# Patient Record
Sex: MALE | Race: WHITE | NOT HISPANIC OR LATINO | Employment: OTHER | URBAN - METROPOLITAN AREA
[De-identification: names, ages, dates, MRNs, and addresses within clinical notes are randomized per-mention and may not be internally consistent; named-entity substitution may affect disease eponyms.]

---

## 2023-09-01 ENCOUNTER — TELEPHONE (OUTPATIENT)
Dept: UROLOGY | Facility: CLINIC | Age: 58
End: 2023-09-01
Payer: COMMERCIAL

## 2023-09-01 ENCOUNTER — PATIENT MESSAGE (OUTPATIENT)
Dept: UROLOGY | Facility: CLINIC | Age: 58
End: 2023-09-01
Payer: COMMERCIAL

## 2023-09-01 NOTE — TELEPHONE ENCOUNTER
----- Message from Brian Quintero sent at 9/1/2023  9:04 AM CDT -----  Contact: pt at [phone]  Type:  Sooner Appointment Request    Caller is requesting a sooner appointment.  Caller declined first available appointment listed below.  Caller will not accept being placed on the waitlist and is requesting a message be sent to doctor.    Name of Caller:  pt  When is the first available appointment?  12/6  Symptoms:  kidney stone  Best Call Back Number: [phone]  Additional Information:  pt is calling the office to schedule an appt to have kidney stone removed and the date of 12/6 came up he states he needs to be seen sooner than that date ASAP.      PER THE PT PLEASE CALL BACK TODAY ASAP

## 2023-09-05 ENCOUNTER — OFFICE VISIT (OUTPATIENT)
Dept: UROLOGY | Facility: CLINIC | Age: 58
End: 2023-09-05
Payer: COMMERCIAL

## 2023-09-05 VITALS — HEIGHT: 67 IN | WEIGHT: 115.31 LBS | BODY MASS INDEX: 18.1 KG/M2

## 2023-09-05 DIAGNOSIS — N20.0 KIDNEY STONES: Primary | ICD-10-CM

## 2023-09-05 DIAGNOSIS — N13.1 HYDRONEPHROSIS DUE TO OBSTRUCTION OF URETER: ICD-10-CM

## 2023-09-05 DIAGNOSIS — Z98.890 HISTORY OF URETER REPAIR: ICD-10-CM

## 2023-09-05 LAB
BILIRUBIN, UA POC OHS: NEGATIVE
BLOOD, UA POC OHS: ABNORMAL
CLARITY, UA POC OHS: CLEAR
COLOR, UA POC OHS: YELLOW
GLUCOSE, UA POC OHS: NEGATIVE
KETONES, UA POC OHS: NEGATIVE
LEUKOCYTES, UA POC OHS: ABNORMAL
NITRITE, UA POC OHS: NEGATIVE
PH, UA POC OHS: 6
PROTEIN, UA POC OHS: 30
SPECIFIC GRAVITY, UA POC OHS: 1.01
UROBILINOGEN, UA POC OHS: 0.2

## 2023-09-05 PROCEDURE — 99204 OFFICE O/P NEW MOD 45 MIN: CPT | Mod: S$GLB,,, | Performed by: STUDENT IN AN ORGANIZED HEALTH CARE EDUCATION/TRAINING PROGRAM

## 2023-09-05 PROCEDURE — 1160F PR REVIEW ALL MEDS BY PRESCRIBER/CLIN PHARMACIST DOCUMENTED: ICD-10-PCS | Mod: CPTII,S$GLB,, | Performed by: STUDENT IN AN ORGANIZED HEALTH CARE EDUCATION/TRAINING PROGRAM

## 2023-09-05 PROCEDURE — 99999 PR PBB SHADOW E&M-EST. PATIENT-LVL III: ICD-10-PCS | Mod: PBBFAC,,, | Performed by: STUDENT IN AN ORGANIZED HEALTH CARE EDUCATION/TRAINING PROGRAM

## 2023-09-05 PROCEDURE — 1159F PR MEDICATION LIST DOCUMENTED IN MEDICAL RECORD: ICD-10-PCS | Mod: CPTII,S$GLB,, | Performed by: STUDENT IN AN ORGANIZED HEALTH CARE EDUCATION/TRAINING PROGRAM

## 2023-09-05 PROCEDURE — 99204 PR OFFICE/OUTPT VISIT, NEW, LEVL IV, 45-59 MIN: ICD-10-PCS | Mod: S$GLB,,, | Performed by: STUDENT IN AN ORGANIZED HEALTH CARE EDUCATION/TRAINING PROGRAM

## 2023-09-05 PROCEDURE — 3008F PR BODY MASS INDEX (BMI) DOCUMENTED: ICD-10-PCS | Mod: CPTII,S$GLB,, | Performed by: STUDENT IN AN ORGANIZED HEALTH CARE EDUCATION/TRAINING PROGRAM

## 2023-09-05 PROCEDURE — 3008F BODY MASS INDEX DOCD: CPT | Mod: CPTII,S$GLB,, | Performed by: STUDENT IN AN ORGANIZED HEALTH CARE EDUCATION/TRAINING PROGRAM

## 2023-09-05 PROCEDURE — 81003 POCT URINALYSIS(INSTRUMENT): ICD-10-PCS | Mod: QW,S$GLB,, | Performed by: STUDENT IN AN ORGANIZED HEALTH CARE EDUCATION/TRAINING PROGRAM

## 2023-09-05 PROCEDURE — 81003 URINALYSIS AUTO W/O SCOPE: CPT | Mod: QW,S$GLB,, | Performed by: STUDENT IN AN ORGANIZED HEALTH CARE EDUCATION/TRAINING PROGRAM

## 2023-09-05 PROCEDURE — 99999 PR PBB SHADOW E&M-EST. PATIENT-LVL III: CPT | Mod: PBBFAC,,, | Performed by: STUDENT IN AN ORGANIZED HEALTH CARE EDUCATION/TRAINING PROGRAM

## 2023-09-05 PROCEDURE — 1160F RVW MEDS BY RX/DR IN RCRD: CPT | Mod: CPTII,S$GLB,, | Performed by: STUDENT IN AN ORGANIZED HEALTH CARE EDUCATION/TRAINING PROGRAM

## 2023-09-05 PROCEDURE — 1159F MED LIST DOCD IN RCRD: CPT | Mod: CPTII,S$GLB,, | Performed by: STUDENT IN AN ORGANIZED HEALTH CARE EDUCATION/TRAINING PROGRAM

## 2023-09-05 NOTE — PROGRESS NOTES
"[facility] - [facility]rology   Clinic Note    Subjective:     Chief Complaint: Nephrolithiasis      History of Present Illness:  Josh Ruth is a 58 y.o. male who presents to clinic for evaluation and management of kidney stones and right hydronephrosis. He is new to our clinic.    He has a long history of stones including an open ureterolithotomy with Dr. Gamboa. He was seen recently in the ED. CTRSS from 8/31/2023 was independently reviewed and interpreted showing 15 mm right ureteral stone with severe hydroureteronephrosis. There is parenchymal thinning of the right kidney. He Received a dose of Rocephin while in the ED.  Urine culture from 08/31/2023 showed no evidence of infection.  He has had no fevers.      Past medical, family, surgical and social history reviewed as documented in chart with pertinent positive medical, family, surgical and social history detailed in HPI.    A review systems was conducted with pertinent positive and negative findings documented in HPI.    Anticoagulation/Antiplatelets:  No    Objective:     Estimated body mass index is 18.06 kg/m² as calculated from the following:    Height as of this encounter: 5' 7" (1.702 m).    Weight as of this encounter: 52.3 kg (115 lb 4.8 oz).    Vital Signs (Most Recent)       Physical Exam  Constitutional:       General: He is not in acute distress.     Appearance: He is not ill-appearing or toxic-appearing.   Pulmonary:      Effort: Pulmonary effort is normal. No accessory muscle usage or respiratory distress.   Neurological:      Mental Status: He is alert.         Labs reviewed below:  Lab Results   Component Value Date    BUN 16 08/31/2023    CREATININE 1.00 08/31/2023    WBC 13.21 (H) 08/31/2023    HGB 13.5 (L) 08/31/2023    HCT 38.7 (L) 08/31/2023     08/31/2023    AST 27 08/31/2023    ALT 21 08/31/2023    ALKPHOS 104 08/31/2023    ALBUMIN 3.7 08/31/2023      Urine dipstick today showed large blood, moderate leukocyte esterase, and " negative nitrite.     Assessment:     1. Kidney stones    2. Hydronephrosis due to obstruction of ureter    3. History of ureter repair      Plan:     We reviewed his previous surgical history and imaging.  I recommend a nephrostomy tube placement with IR followed by a nuclear medicine renal scan to evaluate differential function.  We discussed the possibility of nephrectomy if there is minimal function of the right kidney.  We discussed stone management either through an antegrade percutaneous approach or possible repeat ureterolithotomy and repair of the ureter if there is an evidence of blockage.    Defer management of the left renal stones for now but, he is interested in ESWL.     Paul Ruth MD

## 2023-09-27 ENCOUNTER — TELEPHONE (OUTPATIENT)
Dept: UROLOGY | Facility: CLINIC | Age: 58
End: 2023-09-27
Payer: COMMERCIAL

## 2023-10-04 ENCOUNTER — HOSPITAL ENCOUNTER (OUTPATIENT)
Dept: RADIOLOGY | Facility: HOSPITAL | Age: 58
Discharge: HOME | End: 2023-10-04
Attending: STUDENT IN AN ORGANIZED HEALTH CARE EDUCATION/TRAINING PROGRAM
Payer: COMMERCIAL

## 2023-10-04 DIAGNOSIS — N13.2 HYDRONEPHROSIS WITH URINARY OBSTRUCTION DUE TO URETERAL CALCULUS: ICD-10-CM

## 2023-10-04 PROCEDURE — 78708 K FLOW/FUNCT IMAGE W/DRUG: CPT | Mod: 26,,, | Performed by: RADIOLOGY

## 2023-10-04 PROCEDURE — 78708 NM RENOGRAM WITH LASIX: ICD-10-PCS | Mod: 26,,, | Performed by: RADIOLOGY

## 2023-10-04 PROCEDURE — 78708 K FLOW/FUNCT IMAGE W/DRUG: CPT | Mod: TC,PO

## 2023-10-09 ENCOUNTER — OFFICE VISIT (OUTPATIENT)
Dept: UROLOGY | Facility: CLINIC | Age: 58
End: 2023-10-09
Payer: COMMERCIAL

## 2023-10-09 ENCOUNTER — TELEPHONE (OUTPATIENT)
Dept: UROLOGY | Facility: CLINIC | Age: 58
End: 2023-10-09
Payer: COMMERCIAL

## 2023-10-09 VITALS — BODY MASS INDEX: 18.1 KG/M2 | WEIGHT: 115.31 LBS | HEIGHT: 67 IN

## 2023-10-09 DIAGNOSIS — N13.2 HYDRONEPHROSIS WITH URINARY OBSTRUCTION DUE TO URETERAL CALCULUS: Primary | ICD-10-CM

## 2023-10-09 DIAGNOSIS — N20.1 URETERAL STONE: Primary | ICD-10-CM

## 2023-10-09 LAB
BILIRUBIN, UA POC OHS: NEGATIVE
BLOOD, UA POC OHS: ABNORMAL
CLARITY, UA POC OHS: ABNORMAL
COLOR, UA POC OHS: YELLOW
GLUCOSE, UA POC OHS: NEGATIVE
KETONES, UA POC OHS: NEGATIVE
LEUKOCYTES, UA POC OHS: ABNORMAL
NITRITE, UA POC OHS: NEGATIVE
PH, UA POC OHS: 6
PROTEIN, UA POC OHS: NEGATIVE
SPECIFIC GRAVITY, UA POC OHS: 1.02
UROBILINOGEN, UA POC OHS: 1

## 2023-10-09 PROCEDURE — 1160F RVW MEDS BY RX/DR IN RCRD: CPT | Mod: CPTII,S$GLB,, | Performed by: STUDENT IN AN ORGANIZED HEALTH CARE EDUCATION/TRAINING PROGRAM

## 2023-10-09 PROCEDURE — 81003 POCT URINALYSIS(INSTRUMENT): ICD-10-PCS | Mod: QW,S$GLB,, | Performed by: STUDENT IN AN ORGANIZED HEALTH CARE EDUCATION/TRAINING PROGRAM

## 2023-10-09 PROCEDURE — 1159F MED LIST DOCD IN RCRD: CPT | Mod: CPTII,S$GLB,, | Performed by: STUDENT IN AN ORGANIZED HEALTH CARE EDUCATION/TRAINING PROGRAM

## 2023-10-09 PROCEDURE — 1159F PR MEDICATION LIST DOCUMENTED IN MEDICAL RECORD: ICD-10-PCS | Mod: CPTII,S$GLB,, | Performed by: STUDENT IN AN ORGANIZED HEALTH CARE EDUCATION/TRAINING PROGRAM

## 2023-10-09 PROCEDURE — 99214 PR OFFICE/OUTPT VISIT, EST, LEVL IV, 30-39 MIN: ICD-10-PCS | Mod: S$GLB,,, | Performed by: STUDENT IN AN ORGANIZED HEALTH CARE EDUCATION/TRAINING PROGRAM

## 2023-10-09 PROCEDURE — 3008F PR BODY MASS INDEX (BMI) DOCUMENTED: ICD-10-PCS | Mod: CPTII,S$GLB,, | Performed by: STUDENT IN AN ORGANIZED HEALTH CARE EDUCATION/TRAINING PROGRAM

## 2023-10-09 PROCEDURE — 87086 URINE CULTURE/COLONY COUNT: CPT | Performed by: STUDENT IN AN ORGANIZED HEALTH CARE EDUCATION/TRAINING PROGRAM

## 2023-10-09 PROCEDURE — 99999 PR PBB SHADOW E&M-EST. PATIENT-LVL II: ICD-10-PCS | Mod: PBBFAC,,, | Performed by: STUDENT IN AN ORGANIZED HEALTH CARE EDUCATION/TRAINING PROGRAM

## 2023-10-09 PROCEDURE — 99999 PR PBB SHADOW E&M-EST. PATIENT-LVL II: CPT | Mod: PBBFAC,,, | Performed by: STUDENT IN AN ORGANIZED HEALTH CARE EDUCATION/TRAINING PROGRAM

## 2023-10-09 PROCEDURE — 3008F BODY MASS INDEX DOCD: CPT | Mod: CPTII,S$GLB,, | Performed by: STUDENT IN AN ORGANIZED HEALTH CARE EDUCATION/TRAINING PROGRAM

## 2023-10-09 PROCEDURE — 1160F PR REVIEW ALL MEDS BY PRESCRIBER/CLIN PHARMACIST DOCUMENTED: ICD-10-PCS | Mod: CPTII,S$GLB,, | Performed by: STUDENT IN AN ORGANIZED HEALTH CARE EDUCATION/TRAINING PROGRAM

## 2023-10-09 PROCEDURE — 99214 OFFICE O/P EST MOD 30 MIN: CPT | Mod: S$GLB,,, | Performed by: STUDENT IN AN ORGANIZED HEALTH CARE EDUCATION/TRAINING PROGRAM

## 2023-10-09 PROCEDURE — 81003 URINALYSIS AUTO W/O SCOPE: CPT | Mod: QW,S$GLB,, | Performed by: STUDENT IN AN ORGANIZED HEALTH CARE EDUCATION/TRAINING PROGRAM

## 2023-10-09 NOTE — PROGRESS NOTES
"[facility] - Urology   Clinic Note    Subjective:     Chief Complaint: Follow-up      History of Present Illness:  Josh Ruth is a 58 y.o. male who presents to clinic for evaluation and management of kidney stones and right hydronephrosis. He is new to our clinic.    He has a long history of stones including an open ureterolithotomy with Dr. Gamboa. He was seen recently in the ED. CTRSS from 8/31/2023 shows a 15 mm right ureteral stone with severe hydroureteronephrosis. There is another ureteral stone distal to this. There is some parenchymal thinning of the right kidney. He underwent right NT placement with IR and subsequent renal scan 10/4/2023. This showed differential function of 63% left and 37% right. Poor response to lasix bilaterally. Scans were independently reviewed and interpreted.        Past medical, family, surgical and social history reviewed as documented in chart with pertinent positive medical, family, surgical and social history detailed in HPI.    A review systems was conducted with pertinent positive and negative findings documented in HPI.    Anticoagulation/Antiplatelets:  No    Objective:     Estimated body mass index is 18.06 kg/m² as calculated from the following:    Height as of this encounter: 5' 7" (1.702 m).    Weight as of this encounter: 52.3 kg (115 lb 4.8 oz).    Vital Signs (Most Recent)       Physical Exam  Constitutional:       General: He is not in acute distress.     Appearance: He is not ill-appearing or toxic-appearing.   Pulmonary:      Effort: Pulmonary effort is normal. No accessory muscle usage or respiratory distress.   Neurological:      Mental Status: He is alert.       Labs reviewed below:  Lab Results   Component Value Date    BUN 16 08/31/2023    CREATININE 1.00 08/31/2023    WBC 13.05 (H) 09/26/2023    HGB 15.0 09/26/2023    HCT 44.2 09/26/2023     09/26/2023    AST 27 08/31/2023    ALT 21 08/31/2023    ALKPHOS 104 08/31/2023    ALBUMIN 3.7 " 08/31/2023      Urine dipstick today showed large blood, moderate leukocyte esterase, and negative nitrite.     Assessment:     1. Hydronephrosis with urinary obstruction due to ureteral calculus      Plan:     Plan for retrograde ureteroscopy with laser lithotripsy possible ureteral balloon dilation.   I have explained the indication, risks, benefits, and alternatives of the procedure in detail.  Risks including but not limited to bleeding, infection, injury to the urethra, bladder, ureter, need for additional treatments, inability or incomplete removal of kidney stones, pain, and discomfort related to the stent were discussed in depth with the patient.  The patient voices understanding and all questions have been answered.  The patient agrees to proceed as planned with right ureteroscopy, laser lithotripsy, and possible ureteral stent placement.   Discussed the possibility of ureteral stricture with possible dilation.    Defer management of the left renal stones for now but, he is interested in ESWL.     Paul Ruth MD

## 2023-10-11 LAB — BACTERIA UR CULT: NORMAL

## 2023-10-17 ENCOUNTER — TELEPHONE (OUTPATIENT)
Dept: UROLOGY | Facility: CLINIC | Age: 58
End: 2023-10-17
Payer: COMMERCIAL

## 2023-10-19 ENCOUNTER — TELEPHONE (OUTPATIENT)
Dept: UROLOGY | Facility: CLINIC | Age: 58
End: 2023-10-19
Payer: COMMERCIAL

## 2023-10-27 ENCOUNTER — TELEPHONE (OUTPATIENT)
Dept: UROLOGY | Facility: CLINIC | Age: 58
End: 2023-10-27
Payer: COMMERCIAL

## 2023-10-27 NOTE — TELEPHONE ENCOUNTER
----- Message from Elizabeth Vegas sent at 10/27/2023 11:28 AM CDT -----  Contact: Patient  Type:  Needs Medical Advice    Who Called: Patient    Pharmacy name and phone #:    [facility][id number] - [facility] [address] [facility] [address] [address] [address] [address]  [id number] [place] [address]  Phone: [phone] Fax: [phone]      Would the patient rather a call back or a response via [facility]? Call    Best Call Back Number: [phone] (home) [phone] (work)    Additional Information: Patient is requesting to speak with a nurse

## 2023-11-08 ENCOUNTER — PROCEDURE VISIT (OUTPATIENT)
Dept: UROLOGY | Facility: CLINIC | Age: 58
End: 2023-11-08
Payer: COMMERCIAL

## 2023-11-08 VITALS — BODY MASS INDEX: 17.34 KG/M2 | WEIGHT: 114.44 LBS | HEIGHT: 68 IN

## 2023-11-08 DIAGNOSIS — N13.2 HYDRONEPHROSIS WITH URINARY OBSTRUCTION DUE TO URETERAL CALCULUS: Primary | ICD-10-CM

## 2023-11-08 LAB
BILIRUBIN, UA POC OHS: ABNORMAL
BLOOD, UA POC OHS: ABNORMAL
CLARITY, UA POC OHS: ABNORMAL
COLOR, UA POC OHS: YELLOW
GLUCOSE, UA POC OHS: NEGATIVE
KETONES, UA POC OHS: NEGATIVE
LEUKOCYTES, UA POC OHS: ABNORMAL
NITRITE, UA POC OHS: NEGATIVE
PH, UA POC OHS: 6
PROTEIN, UA POC OHS: >=300
SPECIFIC GRAVITY, UA POC OHS: >=1.03
UROBILINOGEN, UA POC OHS: 0.2

## 2023-11-08 PROCEDURE — 81003 URINALYSIS AUTO W/O SCOPE: CPT | Mod: QW,S$GLB,, | Performed by: STUDENT IN AN ORGANIZED HEALTH CARE EDUCATION/TRAINING PROGRAM

## 2023-11-08 PROCEDURE — 81003 POCT URINALYSIS(INSTRUMENT): ICD-10-PCS | Mod: QW,S$GLB,, | Performed by: STUDENT IN AN ORGANIZED HEALTH CARE EDUCATION/TRAINING PROGRAM

## 2023-11-08 RX ORDER — SULFAMETHOXAZOLE AND TRIMETHOPRIM 800; 160 MG/1; MG/1
1 TABLET ORAL 2 TIMES DAILY
Qty: 14 TABLET | Refills: 0 | Status: SHIPPED | OUTPATIENT
Start: 2023-11-08 | End: 2023-11-15

## 2023-11-08 NOTE — PROCEDURES
CYSTOSCOPY W/ STENT REMOVAL    Date/Time: 11/8/2023 1:00 PM    Performed by: Paul Ruth MD  Authorized by: Paul Ruth MD      Procedure:   Flexible cysto-urethroscopy and ureteral stent removal    Pre Procedure Diagnosis:   s/p ureteroscopy and stent placement    Post Procedure Diagnosis:   Same    Surgeon: Paul Ruth MD     Anesthesia: 2% uro-jet lidocaine jelly for local analgesia     Procedure note:  The risks and benefits were explained and informed consent was obtained. 2% lidocaine urojet was used for local analgesia. The genitalia was prepped and draped in the sterile fashion.    Flexible cysto-urethroscopy was performed. The flexible scope was advanced into the urethra and into the bladder. The stent was removed without difficulty.    The patient tolerated the procedure well without complication.     He will follow up in 1 week to consider removal of his nephrostomy tube which he will leave clamped

## 2023-11-17 ENCOUNTER — OFFICE VISIT (OUTPATIENT)
Dept: UROLOGY | Facility: CLINIC | Age: 58
End: 2023-11-17
Payer: COMMERCIAL

## 2023-11-17 VITALS — BODY MASS INDEX: 17.34 KG/M2 | HEIGHT: 68 IN | WEIGHT: 114.44 LBS

## 2023-11-17 DIAGNOSIS — N13.2 HYDRONEPHROSIS WITH URINARY OBSTRUCTION DUE TO URETERAL CALCULUS: Primary | ICD-10-CM

## 2023-11-17 DIAGNOSIS — Z98.890 HISTORY OF URETER REPAIR: ICD-10-CM

## 2023-11-17 DIAGNOSIS — N20.0 KIDNEY STONES: ICD-10-CM

## 2023-11-17 PROCEDURE — 99999 PR PBB SHADOW E&M-EST. PATIENT-LVL III: ICD-10-PCS | Mod: PBBFAC,,, | Performed by: STUDENT IN AN ORGANIZED HEALTH CARE EDUCATION/TRAINING PROGRAM

## 2023-11-17 PROCEDURE — 3008F BODY MASS INDEX DOCD: CPT | Mod: CPTII,S$GLB,, | Performed by: STUDENT IN AN ORGANIZED HEALTH CARE EDUCATION/TRAINING PROGRAM

## 2023-11-17 PROCEDURE — 1159F PR MEDICATION LIST DOCUMENTED IN MEDICAL RECORD: ICD-10-PCS | Mod: CPTII,S$GLB,, | Performed by: STUDENT IN AN ORGANIZED HEALTH CARE EDUCATION/TRAINING PROGRAM

## 2023-11-17 PROCEDURE — 99213 OFFICE O/P EST LOW 20 MIN: CPT | Mod: S$GLB,,, | Performed by: STUDENT IN AN ORGANIZED HEALTH CARE EDUCATION/TRAINING PROGRAM

## 2023-11-17 PROCEDURE — 99999 PR PBB SHADOW E&M-EST. PATIENT-LVL III: CPT | Mod: PBBFAC,,, | Performed by: STUDENT IN AN ORGANIZED HEALTH CARE EDUCATION/TRAINING PROGRAM

## 2023-11-17 PROCEDURE — 1159F MED LIST DOCD IN RCRD: CPT | Mod: CPTII,S$GLB,, | Performed by: STUDENT IN AN ORGANIZED HEALTH CARE EDUCATION/TRAINING PROGRAM

## 2023-11-17 PROCEDURE — 3008F PR BODY MASS INDEX (BMI) DOCUMENTED: ICD-10-PCS | Mod: CPTII,S$GLB,, | Performed by: STUDENT IN AN ORGANIZED HEALTH CARE EDUCATION/TRAINING PROGRAM

## 2023-11-17 PROCEDURE — 1160F PR REVIEW ALL MEDS BY PRESCRIBER/CLIN PHARMACIST DOCUMENTED: ICD-10-PCS | Mod: CPTII,S$GLB,, | Performed by: STUDENT IN AN ORGANIZED HEALTH CARE EDUCATION/TRAINING PROGRAM

## 2023-11-17 PROCEDURE — 1160F RVW MEDS BY RX/DR IN RCRD: CPT | Mod: CPTII,S$GLB,, | Performed by: STUDENT IN AN ORGANIZED HEALTH CARE EDUCATION/TRAINING PROGRAM

## 2023-11-17 PROCEDURE — 99213 PR OFFICE/OUTPT VISIT, EST, LEVL III, 20-29 MIN: ICD-10-PCS | Mod: S$GLB,,, | Performed by: STUDENT IN AN ORGANIZED HEALTH CARE EDUCATION/TRAINING PROGRAM

## 2023-11-17 NOTE — PROGRESS NOTES
"[facility] - Urology   Clinic Note    Subjective:     Chief Complaint: No chief complaint on file.      History of Present Illness:  Josh Ruth is a 58 y.o. male who presents to clinic for evaluation and management of kidney stones and right hydronephrosis. He is new to our clinic.    He has a long history of stones including an open ureterolithotomy with Dr. Gamboa. He was seen recently in the ED. CTRSS from 8/31/2023 shows a 15 mm right ureteral stone with severe hydroureteronephrosis.  He underwent right NT placement with IR and subsequent renal scan 10/4/2023. This showed differential function of 63% left and 37% right. Poor response to lasix bilaterally.   He underwent retrograde ureteroscopy and laser lithotripsy. He NT was clamped and his stent was subsequently removed. He presents today to evaluate for NT removal after clamping trial.     He feels like he had LEFT flank pain and subsequent passed a stone in the last week.     Past medical, family, surgical and social history reviewed as documented in chart with pertinent positive medical, family, surgical and social history detailed in HPI.    A review systems was conducted with pertinent positive and negative findings documented in HPI.    Anticoagulation/Antiplatelets:  No    Objective:     Estimated body mass index is 17.66 kg/m² as calculated from the following:    Height as of 11/8/23: 5' 7.5" (1.715 m).    Weight as of 11/8/23: 51.9 kg (114 lb 6.7 oz).    Vital Signs (Most Recent)       Physical Exam  Constitutional:       General: He is not in acute distress.     Appearance: He is not ill-appearing or toxic-appearing.   Pulmonary:      Effort: Pulmonary effort is normal. No accessory muscle usage or respiratory distress.   Neurological:      Mental Status: He is alert.         Labs reviewed below:  Lab Results   Component Value Date    BUN 14 10/28/2023    CREATININE 0.90 10/28/2023    WBC 13.93 (H) 10/28/2023    HGB 14.0 10/28/2023    HCT " 40.6 10/28/2023     10/28/2023    AST 28 10/28/2023    ALT 18 10/28/2023    ALKPHOS 106 10/28/2023    ALBUMIN 3.8 10/28/2023      Urine dipstick today showed large blood, moderate leukocyte esterase, and negative nitrite.     Assessment:     1. Hydronephrosis with urinary obstruction due to ureteral calculus    2. History of ureter repair    3. Kidney stones      Plan:     NT removed today  Take Keflex Rx due to duration of indwelling tubes and removal  Plan for KUB to evaluate stone burden and consider further stone management     Paul Ruth MD

## 2024-06-11 ENCOUNTER — OFFICE VISIT (OUTPATIENT)
Dept: UROLOGY | Facility: CLINIC | Age: 59
End: 2024-06-11
Payer: COMMERCIAL

## 2024-06-11 ENCOUNTER — HOSPITAL ENCOUNTER (OUTPATIENT)
Dept: RADIOLOGY | Facility: HOSPITAL | Age: 59
Discharge: HOME | End: 2024-06-11
Payer: COMMERCIAL

## 2024-06-11 VITALS — BODY MASS INDEX: 18.14 KG/M2 | HEIGHT: 68 IN | WEIGHT: 119.69 LBS

## 2024-06-11 DIAGNOSIS — Z87.442 HISTORY OF KIDNEY STONES: Primary | ICD-10-CM

## 2024-06-11 DIAGNOSIS — Z87.442 HISTORY OF KIDNEY STONES: ICD-10-CM

## 2024-06-11 DIAGNOSIS — R10.30 LOWER ABDOMINAL PAIN: ICD-10-CM

## 2024-06-11 LAB
BILIRUBIN, UA POC OHS: NEGATIVE
BLOOD, UA POC OHS: ABNORMAL
CLARITY, UA POC OHS: CLEAR
COLOR, UA POC OHS: YELLOW
GLUCOSE, UA POC OHS: NEGATIVE
KETONES, UA POC OHS: NEGATIVE
LEUKOCYTES, UA POC OHS: ABNORMAL
NITRITE, UA POC OHS: NEGATIVE
PH, UA POC OHS: 6
PROTEIN, UA POC OHS: 30
SPECIFIC GRAVITY, UA POC OHS: 1.02
UROBILINOGEN, UA POC OHS: 0.2

## 2024-06-11 PROCEDURE — 1159F MED LIST DOCD IN RCRD: CPT | Mod: CPTII,S$GLB,,

## 2024-06-11 PROCEDURE — 81003 URINALYSIS AUTO W/O SCOPE: CPT | Mod: QW,S$GLB,,

## 2024-06-11 PROCEDURE — 87086 URINE CULTURE/COLONY COUNT: CPT

## 2024-06-11 PROCEDURE — 74176 CT ABD & PELVIS W/O CONTRAST: CPT | Mod: TC,PO

## 2024-06-11 PROCEDURE — 99213 OFFICE O/P EST LOW 20 MIN: CPT | Mod: S$GLB,,,

## 2024-06-11 PROCEDURE — 1160F RVW MEDS BY RX/DR IN RCRD: CPT | Mod: CPTII,S$GLB,,

## 2024-06-11 PROCEDURE — 74176 CT ABD & PELVIS W/O CONTRAST: CPT | Mod: 26,,, | Performed by: STUDENT IN AN ORGANIZED HEALTH CARE EDUCATION/TRAINING PROGRAM

## 2024-06-11 PROCEDURE — 3008F BODY MASS INDEX DOCD: CPT | Mod: CPTII,S$GLB,,

## 2024-06-11 PROCEDURE — 99999 PR PBB SHADOW E&M-EST. PATIENT-LVL III: CPT | Mod: PBBFAC,,,

## 2024-06-11 RX ORDER — TAMSULOSIN HYDROCHLORIDE 0.4 MG/1
0.4 CAPSULE ORAL DAILY
Qty: 30 CAPSULE | Refills: 0 | Status: SHIPPED | OUTPATIENT
Start: 2024-06-11 | End: 2024-07-11

## 2024-06-11 RX ORDER — TAMSULOSIN HYDROCHLORIDE 0.4 MG/1
CAPSULE ORAL
Qty: 90 CAPSULE | OUTPATIENT
Start: 2024-06-11

## 2024-06-11 NOTE — PROGRESS NOTES
"[facility] Clinic Note  Staff: ELLIE Duncan    PCP: MD Claire    Chief Complaint: Kidney Stone    Subjective:        HPI: Josh Ruth is a 59 y.o. male new patient to me presents today for evaluation of kidney stone. He is established to our office and was followed by Dr. Ruth. He has a history of kidney stones. He states he passed a stone 10 days ago and then another 2 days later. He states he knows he currently has a stone in his bladder but states it is stuck and he cannot pass it. He has not had recent imaging. He denies dysuria, hematuria, fever, flank pain, incontinence, and difficulty urinating.     Questions asked the pt during ov today:  Urgency: No, urge incontinence? No  Dysuria: No  Gross Hematuria:No  Straining:No, Hesistancy:No, Intermittency:No}, Weak stream:No    Last PSA Screening: No results found for: "PSA", "PSADIAG"    History of Kidney Stones?:  Yes    Constipation issues?:  No    REVIEW OF SYSTEMS:  Review of Systems   Constitutional: Negative.  Negative for chills and fever.   HENT: Negative.     Eyes: Negative.    Respiratory: Negative.     Cardiovascular: Negative.    Gastrointestinal: Negative.  Negative for abdominal pain, constipation, diarrhea, nausea and vomiting.   Genitourinary:  Negative for dysuria, flank pain, frequency, hematuria and urgency.   Musculoskeletal: Negative.  Negative for back pain.   Skin: Negative.    Neurological: Negative.    Endo/Heme/Allergies: Negative.    Psychiatric/Behavioral: Negative.         PMHx:  Past Medical History:   Diagnosis Date    Hypertension     Renal disorder        PSHx:  Past Surgical History:   Procedure Laterality Date    CYSTOSCOPY W/ LASER LITHOTRIPSY      CYSTOURETEROSCOPY WITH RETROGRADE PYELOGRAPHY AND INSERTION OF STENT INTO URETER Right 10/26/2023    Procedure: CYSTOURETEROSCOPY, WITH RETROGRADE PYELOGRAM AND URETERAL STENT INSERTION;  Surgeon: Paul Ruth MD;  Location: [facility] OR;  Service: " Urology;  Laterality: Right;    LASER LITHOTRIPSY Right 10/26/2023    Procedure: LITHOTRIPSY, USING LASER-THULIUM;  Surgeon: Paul Ruth MD;  Location: [facility] OR;  Service: Urology;  Laterality: Right;    LUMBAR FUSION  12/2019       Fam Hx:   malignancies: No    kidney stones: No     Soc Hx:  Lives in [place]    Allergies:  Pcn [penicillins]    Medications: reviewed     Objective:   There were no vitals filed for this visit.    Physical Exam  Constitutional:       Appearance: Normal appearance.   HENT:      Head: Normocephalic.      Mouth/Throat:      Mouth: Mucous membranes are moist.   Eyes:      Conjunctiva/sclera: Conjunctivae normal.   Pulmonary:      Effort: Pulmonary effort is normal.   Abdominal:      General: There is no distension.      Palpations: Abdomen is soft.      Tenderness: There is no abdominal tenderness. There is no right CVA tenderness or left CVA tenderness.   Musculoskeletal:         General: Normal range of motion.      Cervical back: Normal range of motion.   Skin:     General: Skin is warm.   Neurological:      Mental Status: He is alert and oriented to person, place, and time.   Psychiatric:         Mood and Affect: Mood normal.         Behavior: Behavior normal.         LABS REVIEW:  UA today:  Color:Clear, Yellow  Spec. Grav.  1.020  PH  6.0  Negative for nitrates, glucose, ketones, urobili, bili  Moderate blood  Positive protein  Trace leuks    Assessment:       1. History of kidney stones    2. Lower abdominal pain          Plan:     Urine sent for culture  CT RSS ordered and scheduled  Flomax 0.4mg prescribed to aid stone passage?  Recommendations:   Increase hydration 2 liters fluid per day.      Strain Urine     Take pain medications as needed     Call office for severe pain or fever >101  Things you can do to decrease your risk of recurring stones:  1. Increase fluid intake - You should be producing at least 2.5 L of urine per 24 hour period. If your urine is dark you need to be  drinking more water.  2. Lower sodium intake - this helps lower the amount of calcium being lost in your urine. An ideal intake is between 2300 and 3300mg of sodium daily.  3. Normal calcium diet - ideal intake is between 800 and 1200mg of calcium daily. You do not want to decrease the amount of calcium you take in because this can actually increase your risk of making stone.     Limit iced tea and kim,  avoid Tums and Rolaids, to avoid Vitamin C supplementation and to limit salt and red meat intake.        F/u as needed per treatment plan    [facility]: Active    Crissy Foley, INOCENCIAP-C

## 2024-06-12 DIAGNOSIS — N20.0 RIGHT KIDNEY STONE: ICD-10-CM

## 2024-06-12 DIAGNOSIS — Z87.442 HISTORY OF KIDNEY STONES: Primary | ICD-10-CM

## 2024-06-12 LAB — BACTERIA UR CULT: NORMAL

## 2024-06-12 NOTE — TELEPHONE ENCOUNTER
Spoke to pt on the phone in addition to [employer] message. Gave pt surgery date and location and the number to call to schedule pre op appointment. Pt verbalized understanding.

## 2024-07-10 ENCOUNTER — TELEPHONE (OUTPATIENT)
Dept: UROLOGY | Facility: CLINIC | Age: 59
End: 2024-07-10
Payer: COMMERCIAL

## 2024-07-10 NOTE — TELEPHONE ENCOUNTER
----- Message from Paul Ruth MD sent at 7/10/2024  1:59 PM CDT -----  Please cancel his appointment tomorrow. He needs to be scheduled 7/24/24 for right ureteroscopy the works with thulium. He does not need a visit tomorrow.   Thanks,  RW

## 2024-07-10 NOTE — TELEPHONE ENCOUNTER
----- Message from Laura Aponte sent at 7/9/2024  3:36 PM CDT -----  Contact: self  Type:  Sooner Appointment Request    Caller is requesting a sooner appointment.  Caller declined first available appointment listed below.  Caller will not accept being placed on the waitlist and is requesting a message be sent to doctor.    Name of Caller:  pt  When is the first available appointment?  N/a  Symptoms:   follow up/would like this week  Would the patient rather a call back or a response via [facility]? call  Best Call Back Number:  [phone]   Additional Information:  please call

## 2024-07-12 ENCOUNTER — TELEPHONE (OUTPATIENT)
Dept: UROLOGY | Facility: CLINIC | Age: 59
End: 2024-07-12
Payer: COMMERCIAL

## 2024-07-12 DIAGNOSIS — N20.0 KIDNEY STONES: Primary | ICD-10-CM

## 2024-07-15 ENCOUNTER — TELEPHONE (OUTPATIENT)
Dept: UROLOGY | Facility: CLINIC | Age: 59
End: 2024-07-15
Payer: COMMERCIAL

## 2024-07-22 ENCOUNTER — TELEPHONE (OUTPATIENT)
Dept: UROLOGY | Facility: CLINIC | Age: 59
End: 2024-07-22
Payer: COMMERCIAL

## 2024-07-22 DIAGNOSIS — N20.0 KIDNEY STONES: Primary | ICD-10-CM

## 2024-07-23 ENCOUNTER — TELEPHONE (OUTPATIENT)
Dept: UROLOGY | Facility: CLINIC | Age: 59
End: 2024-07-23
Payer: COMMERCIAL

## 2024-07-23 NOTE — TELEPHONE ENCOUNTER
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 Patient was rescheduled to 8/7/24  at [facility]. Patient expressed understanding.

## 2024-09-25 ENCOUNTER — TELEPHONE (OUTPATIENT)
Dept: UROLOGY | Facility: CLINIC | Age: 59
End: 2024-09-25
Payer: COMMERCIAL

## 2024-09-25 NOTE — TELEPHONE ENCOUNTER
----- Message from Kary Murry sent at 9/25/2024  1:01 PM CDT -----  Contact: Patient  Type:  Needs Medical Advice    Who Called:   Patient    Symptoms (please be specific):   Pain in back part of kidney since surgery / feels like back pain but not     Pharmacy name and phone #:        [facility] #[id number] - [facility] [address] [address] AT [facility] [address] [address]  [id number] [place] [id number]  Phone: [phone] Fax: [phone]    Would the patient rather a call back or a response via [facility]?   Call back  Best Call Back Number:   [phone]    Additional Information:   States he would like to speak with someone about symptoms he's experiencing since his surgery - please call - thank you

## 2024-09-26 ENCOUNTER — HOSPITAL ENCOUNTER (OUTPATIENT)
Dept: RADIOLOGY | Facility: HOSPITAL | Age: 59
Discharge: HOME | End: 2024-09-26
Attending: STUDENT IN AN ORGANIZED HEALTH CARE EDUCATION/TRAINING PROGRAM
Payer: COMMERCIAL

## 2024-09-26 DIAGNOSIS — N13.2 HYDRONEPHROSIS WITH URINARY OBSTRUCTION DUE TO URETERAL CALCULUS: ICD-10-CM

## 2024-09-26 PROCEDURE — 76775 US EXAM ABDO BACK WALL LIM: CPT | Mod: TC,PO

## 2024-09-26 PROCEDURE — 76775 US EXAM ABDO BACK WALL LIM: CPT | Mod: 26,,, | Performed by: RADIOLOGY

## 2024-09-27 ENCOUNTER — TELEPHONE (OUTPATIENT)
Dept: UROLOGY | Facility: CLINIC | Age: 59
End: 2024-09-27
Payer: COMMERCIAL

## 2024-09-27 NOTE — TELEPHONE ENCOUNTER
----- Message from Paul Ruth MD sent at 9/27/2024  9:52 AM CDT -----  Please let the patient know there was mild swelling of the kidney.  He should keep his follow-up for the renal scan as scheduled

## 2024-09-27 NOTE — TELEPHONE ENCOUNTER
Spoke to the patient in regards to swelling of the kidney per Dr. Ruth. Patient stated he is still having pain and would like to get a sooner appt. Scheduled. Message was sent to the provider to assist with patients care. Patient verbally understood the advise that was given.

## 2024-10-16 ENCOUNTER — HOSPITAL ENCOUNTER (OUTPATIENT)
Dept: RADIOLOGY | Facility: HOSPITAL | Age: 59
Discharge: HOME | End: 2024-10-16
Attending: STUDENT IN AN ORGANIZED HEALTH CARE EDUCATION/TRAINING PROGRAM
Payer: COMMERCIAL

## 2024-10-16 ENCOUNTER — TELEPHONE (OUTPATIENT)
Dept: UROLOGY | Facility: CLINIC | Age: 59
End: 2024-10-16
Payer: COMMERCIAL

## 2024-10-16 DIAGNOSIS — N13.2 HYDRONEPHROSIS WITH URINARY OBSTRUCTION DUE TO URETERAL CALCULUS: ICD-10-CM

## 2024-10-16 PROCEDURE — 78708 K FLOW/FUNCT IMAGE W/DRUG: CPT | Mod: TC,PO

## 2024-10-16 PROCEDURE — A9562 TC99M MERTIATIDE: HCPCS | Mod: PO | Performed by: STUDENT IN AN ORGANIZED HEALTH CARE EDUCATION/TRAINING PROGRAM

## 2024-10-16 PROCEDURE — 78708 K FLOW/FUNCT IMAGE W/DRUG: CPT | Mod: 26,,, | Performed by: STUDENT IN AN ORGANIZED HEALTH CARE EDUCATION/TRAINING PROGRAM

## 2024-10-16 RX ORDER — BETIATIDE 1 MG/1
5.5 INJECTION, POWDER, LYOPHILIZED, FOR SOLUTION INTRAVENOUS
Status: COMPLETED | OUTPATIENT
Start: 2024-10-16 | End: 2024-10-16

## 2024-10-16 RX ADMIN — BETIATIDE 5.5 MILLICURIE: 1 INJECTION, POWDER, LYOPHILIZED, FOR SOLUTION INTRAVENOUS at 01:10

## 2024-10-16 NOTE — TELEPHONE ENCOUNTER
----- Message from Paul Ruth MD sent at 10/16/2024  3:28 PM CDT -----  Can we try to move his visit up if someone is available sooner than scheduled?

## 2024-10-22 ENCOUNTER — LAB VISIT (OUTPATIENT)
Dept: LAB | Facility: HOSPITAL | Age: 59
End: 2024-10-22
Attending: STUDENT IN AN ORGANIZED HEALTH CARE EDUCATION/TRAINING PROGRAM
Payer: COMMERCIAL

## 2024-10-22 ENCOUNTER — OFFICE VISIT (OUTPATIENT)
Dept: UROLOGY | Facility: CLINIC | Age: 59
End: 2024-10-22
Payer: COMMERCIAL

## 2024-10-22 VITALS — WEIGHT: 120.38 LBS | BODY MASS INDEX: 18.89 KG/M2 | HEIGHT: 67 IN

## 2024-10-22 DIAGNOSIS — E21.3 HYPERPARATHYROIDISM: ICD-10-CM

## 2024-10-22 DIAGNOSIS — Z87.442 HISTORY OF KIDNEY STONES: ICD-10-CM

## 2024-10-22 DIAGNOSIS — E83.52 HYPERCALCEMIA: ICD-10-CM

## 2024-10-22 DIAGNOSIS — Z87.442 HISTORY OF KIDNEY STONES: Primary | ICD-10-CM

## 2024-10-22 DIAGNOSIS — N13.1 HYDRONEPHROSIS DUE TO OBSTRUCTION OF URETER: ICD-10-CM

## 2024-10-22 DIAGNOSIS — N13.2 HYDRONEPHROSIS WITH URINARY OBSTRUCTION DUE TO URETERAL CALCULUS: ICD-10-CM

## 2024-10-22 LAB
BILIRUBIN, UA POC OHS: NEGATIVE
BLOOD, UA POC OHS: ABNORMAL
CA-I BLDV-SCNC: 1.34 MMOL/L (ref 1.06–1.42)
CLARITY, UA POC OHS: CLEAR
COLOR, UA POC OHS: YELLOW
GLUCOSE, UA POC OHS: NEGATIVE
KETONES, UA POC OHS: NEGATIVE
LEUKOCYTES, UA POC OHS: NEGATIVE
NITRITE, UA POC OHS: NEGATIVE
PH, UA POC OHS: 6
PROTEIN, UA POC OHS: NEGATIVE
PTH-INTACT SERPL-MCNC: 69.9 PG/ML (ref 9–77)
SPECIFIC GRAVITY, UA POC OHS: 1.02
UROBILINOGEN, UA POC OHS: 0.2

## 2024-10-22 PROCEDURE — 81003 URINALYSIS AUTO W/O SCOPE: CPT | Mod: QW,S$GLB,, | Performed by: STUDENT IN AN ORGANIZED HEALTH CARE EDUCATION/TRAINING PROGRAM

## 2024-10-22 PROCEDURE — 83970 ASSAY OF PARATHORMONE: CPT | Performed by: STUDENT IN AN ORGANIZED HEALTH CARE EDUCATION/TRAINING PROGRAM

## 2024-10-22 PROCEDURE — 36415 COLL VENOUS BLD VENIPUNCTURE: CPT | Mod: PO | Performed by: STUDENT IN AN ORGANIZED HEALTH CARE EDUCATION/TRAINING PROGRAM

## 2024-10-22 PROCEDURE — 99999 PR PBB SHADOW E&M-EST. PATIENT-LVL IV: CPT | Mod: PBBFAC,,, | Performed by: STUDENT IN AN ORGANIZED HEALTH CARE EDUCATION/TRAINING PROGRAM

## 2024-10-22 PROCEDURE — 1160F RVW MEDS BY RX/DR IN RCRD: CPT | Mod: CPTII,S$GLB,, | Performed by: STUDENT IN AN ORGANIZED HEALTH CARE EDUCATION/TRAINING PROGRAM

## 2024-10-22 PROCEDURE — 3008F BODY MASS INDEX DOCD: CPT | Mod: CPTII,S$GLB,, | Performed by: STUDENT IN AN ORGANIZED HEALTH CARE EDUCATION/TRAINING PROGRAM

## 2024-10-22 PROCEDURE — 82330 ASSAY OF CALCIUM: CPT | Performed by: STUDENT IN AN ORGANIZED HEALTH CARE EDUCATION/TRAINING PROGRAM

## 2024-10-22 PROCEDURE — 99214 OFFICE O/P EST MOD 30 MIN: CPT | Mod: S$GLB,,, | Performed by: STUDENT IN AN ORGANIZED HEALTH CARE EDUCATION/TRAINING PROGRAM

## 2024-10-22 PROCEDURE — 1159F MED LIST DOCD IN RCRD: CPT | Mod: CPTII,S$GLB,, | Performed by: STUDENT IN AN ORGANIZED HEALTH CARE EDUCATION/TRAINING PROGRAM

## 2024-10-22 NOTE — PROGRESS NOTES
[facility]   Clinic Note    Subjective:     Chief Complaint: Follow-up      History of Present Illness:  Josh Ruth is a 59 y.o. male who presents to clinic for evaluation and management of kidney stones and right hydronephrosis. He is new to our clinic.    He has a long history of stones including an open ureterolithotomy for a 3 cm ureteral stone in 2019 with Dr. Gamboa. CTRSS from 8/31/2023 showe a 15 mm right ureteral stone with severe hydroureteronephrosis.  He underwent right NT placement with IR and subsequent renal scan 10/4/2023. This showed differential function of 63% left and 37% right. He underwent retrograde ureteroscopy 10/2023. The stones were successfully treated and there was no intrinsic narrowing up the ureter. The NT was clamped and his stent removed and subsequently the NT after a clamping trial.     He re-presented 6/2024 with flank pain and a CT RSS showed a bladder stone, right ureteral stone, and right renal stone.  He underwent a cystolitholapaxy and 1st stage ureteroscopy 7/24 with subsequent ureteroscopy 8/24    He presents today with repeat renal scan from 10/16/2024.  This shows differential function of 60% on the left and 3rd 2% on the right.  T1 half on the left is 14 minutes.  The right system does not empty despite administration of Lasix.     He reports right flank pain which is worse with ambulation.  He denies Dietl's crisis.  He does have a history of spinal surgery.    He reports a history of an elevated PTH and previous workup at [facility].    Past medical, family, surgical and social history reviewed as documented in chart with pertinent positive medical, family, surgical and social history detailed in HPI.    A review systems was conducted with pertinent positive and negative findings documented in HPI.    Anticoagulation/Antiplatelets:  No    Objective:     Estimated body mass index is 18.85 kg/m² as calculated from the following:    Height as of  "this encounter: 5' 7" (1.702 m).    Weight as of this encounter: 54.6 kg (120 lb 5.9 oz).    Vital Signs (Most Recent)       Physical Exam  Constitutional:       General: He is not in acute distress.     Appearance: He is not ill-appearing or toxic-appearing.   Pulmonary:      Effort: Pulmonary effort is normal. No accessory muscle usage or respiratory distress.   Neurological:      Mental Status: He is alert.         Labs reviewed below:  Lab Results   Component Value Date    BUN 14 10/28/2023    CREATININE 0.90 10/28/2023    WBC 13.93 (H) 10/28/2023    HGB 13.0 (L) 06/26/2024    HCT 40.6 10/28/2023     10/28/2023    AST 28 10/28/2023    ALT 18 10/28/2023    ALKPHOS 106 10/28/2023    ALBUMIN 3.8 10/28/2023      Urine dipstick today showed large blood, moderate leukocyte esterase, and negative nitrite.     Assessment:     1. History of kidney stones    2. Hydronephrosis due to obstruction of ureter    3. Hyperparathyroidism    4. Hypercalcemia        Plan:     Concern for post-operatively extrinsic etiology of obstruction vs adynamic ureteral segment s/p ureterolithotomy. No clear etiology seen on ureteroscopy.   Repeat CT urogram  Discussed further evaluation and management. Discussed nephrostomy tube, antegrade and retrograde evaluation, ureteroureterostomy.    Ref[place] to [place] Mariee at [facility] for [place] opinion    Repeat iPTH and calcium  Litholink ordered  Litholink ordered    Paul Ruth MD   "

## 2024-10-23 ENCOUNTER — TELEPHONE (OUTPATIENT)
Dept: UROLOGY | Facility: CLINIC | Age: 59
End: 2024-10-23
Payer: COMMERCIAL

## 2024-10-23 NOTE — TELEPHONE ENCOUNTER
Called patient to schedule appointment with Dr. Mariee.  Unable to leave message as mailbox if full.

## 2024-11-06 DIAGNOSIS — N13.1 HYDRONEPHROSIS WITH URETERAL STRICTURE: Primary | ICD-10-CM

## 2024-11-11 ENCOUNTER — HOSPITAL ENCOUNTER (OUTPATIENT)
Dept: RADIOLOGY | Facility: HOSPITAL | Age: 59
Discharge: HOME | End: 2024-11-11
Attending: STUDENT IN AN ORGANIZED HEALTH CARE EDUCATION/TRAINING PROGRAM
Payer: COMMERCIAL

## 2024-11-11 DIAGNOSIS — N13.1 HYDRONEPHROSIS DUE TO OBSTRUCTION OF URETER: ICD-10-CM

## 2024-11-11 DIAGNOSIS — Z87.442 HISTORY OF KIDNEY STONES: ICD-10-CM

## 2024-11-11 PROCEDURE — 74178 CT ABD&PLV WO CNTR FLWD CNTR: CPT | Mod: TC,PO

## 2024-11-11 PROCEDURE — 25500020 PHARM REV CODE 255: Mod: PO | Performed by: STUDENT IN AN ORGANIZED HEALTH CARE EDUCATION/TRAINING PROGRAM

## 2024-11-11 RX ADMIN — IOHEXOL 125 ML: 350 INJECTION, SOLUTION INTRAVENOUS at 03:11

## 2024-11-17 NOTE — PROGRESS NOTES
Subjective:      Patient ID: Josh Ruth is a 59 y.o. male.    Chief Complaint: kidney stones/hydronephrosis  Patient is a 59 y.o. male who is established to our clinic and was initially referred by Dr. Ruth for evaluation of 2nd opinion/hydronephrosis.     HPI  Patient has a history of kidney stones.  He previously underwent a right open ureterolithotomy with Dr. Gamboa.  He has had numerous other endoscopic evaluations including most recently a staged ureteroscopy with Dr. Ruth (7/3/24 & 8/7/24).    Mag 3 renal scan from 10/16/24 was independently reviewed today and reveals right renal obstruction, 32% right differential renal function compared to 68% left.    Patient is having regular flank and abdominal pain on the right side.  Denies any fevers or chills.  Here to discuss management of presumed right ureteral stricture and confirmed right renal obstruction.        Review of Systems  All other systems reviewed and negative except pertinent positives noted in HPI.    Objective:     Physical Exam  Constitutional:       General: He is not in acute distress.     Appearance: He is well-developed.   HENT:      Head: Normocephalic and atraumatic.   Eyes:      General: No scleral icterus.  Neck:      Trachea: No tracheal deviation.   Pulmonary:      Effort: Pulmonary effort is normal. No respiratory distress.   Abdominal:       Neurological:      Mental Status: He is alert and oriented to person, place, and time.   Psychiatric:         Behavior: Behavior normal.         Thought Content: Thought content normal.         Judgment: Judgment normal.       Assessment:     1. Hydronephrosis due to obstruction of ureter      Plan:     1. Hydronephrosis due to obstruction of ureter        No orders of the defined types were placed in this encounter.    1. Kidney stone:  -General risk factors for kidney stones and the conservative measures to prevent kidney stones in the future were discussed with the patient in  detail.  The patient was encouraged to drink 2-3 liters of water a day, limit iced tea and kim as well as foods high in oxalate.  They were cautioned to try to limit salt and red meat intake.  We also discussed adding citrate to the diet with the addition of noelle or lemon juice to their water or alternatively with crystal light.   imaging reviewed as per HPI.     -24 hour urine: patient will collect this week---has the container at home.       2. Right hydronephrosis:  -IR referral placed for nephrostomy tube for ureteral rest in anticipation for corrective surgery.   -will plan for an antegrade nephrostogram and retrograde pyelogram 4 weeks s/p neph tube placement for delineation of anatomy for surgical planning.   -Mag 3 renal scan from 10/16/24 was independently reviewed today and reveals 32% right differential renal function.      3. Hyperparathyroidism:  -referral to endocrine surgery.   -patient will do 24 hour urine to asses urinary calcium.   -pth normal most recently  -serum Ca high/normal.   By his report, he has a diagnosis of hyperparathyroidism and has been recommended to undergo parathyroidectomy.  Refer to endocrine surgery.       - code applied: patient requires or will require a continuous, longitudinal, and active collaborative plan of care related to this patient's health condition, kidney stones/hydronephrosis --the management of which requires the direction of a practitioner with specialized clinical knowledge, skill, and expertise.

## 2024-11-27 ENCOUNTER — OFFICE VISIT (OUTPATIENT)
Dept: UROLOGY | Facility: CLINIC | Age: 59
End: 2024-11-27
Payer: COMMERCIAL

## 2024-11-27 VITALS
BODY MASS INDEX: 18.79 KG/M2 | HEIGHT: 67 IN | HEART RATE: 64 BPM | DIASTOLIC BLOOD PRESSURE: 80 MMHG | SYSTOLIC BLOOD PRESSURE: 121 MMHG | WEIGHT: 119.69 LBS

## 2024-11-27 DIAGNOSIS — N13.1 HYDRONEPHROSIS DUE TO OBSTRUCTION OF URETER: ICD-10-CM

## 2024-11-27 DIAGNOSIS — E21.3 HYPERPARATHYROIDISM: Primary | ICD-10-CM

## 2024-11-27 PROCEDURE — 3074F SYST BP LT 130 MM HG: CPT | Mod: CPTII,S$GLB,, | Performed by: UROLOGY

## 2024-11-27 PROCEDURE — 3079F DIAST BP 80-89 MM HG: CPT | Mod: CPTII,S$GLB,, | Performed by: UROLOGY

## 2024-11-27 PROCEDURE — G2211 COMPLEX E/M VISIT ADD ON: HCPCS | Mod: S$GLB,,, | Performed by: UROLOGY

## 2024-11-27 PROCEDURE — 1159F MED LIST DOCD IN RCRD: CPT | Mod: CPTII,S$GLB,, | Performed by: UROLOGY

## 2024-11-27 PROCEDURE — 99999 PR PBB SHADOW E&M-EST. PATIENT-LVL IV: CPT | Mod: PBBFAC,,, | Performed by: UROLOGY

## 2024-11-27 PROCEDURE — 3008F BODY MASS INDEX DOCD: CPT | Mod: CPTII,S$GLB,, | Performed by: UROLOGY

## 2024-11-27 PROCEDURE — 99215 OFFICE O/P EST HI 40 MIN: CPT | Mod: S$GLB,,, | Performed by: UROLOGY

## 2024-11-27 PROCEDURE — 1160F RVW MEDS BY RX/DR IN RCRD: CPT | Mod: CPTII,S$GLB,, | Performed by: UROLOGY

## 2024-11-27 RX ORDER — OXYCODONE AND ACETAMINOPHEN 10; 325 MG/1; MG/1
1 TABLET ORAL EVERY 4 HOURS
COMMUNITY
Start: 2024-11-20

## 2024-12-06 ENCOUNTER — TELEPHONE (OUTPATIENT)
Dept: SURGERY | Facility: CLINIC | Age: 59
End: 2024-12-06
Payer: COMMERCIAL

## 2024-12-11 ENCOUNTER — TELEPHONE (OUTPATIENT)
Dept: UROLOGY | Facility: CLINIC | Age: 59
End: 2024-12-11
Payer: COMMERCIAL

## 2024-12-11 ENCOUNTER — TELEPHONE (OUTPATIENT)
Dept: INTERVENTIONAL RADIOLOGY/VASCULAR | Facility: CLINIC | Age: 59
End: 2024-12-11
Payer: COMMERCIAL

## 2024-12-11 NOTE — TELEPHONE ENCOUNTER
Call and spoke to patient to r/s his missed IR clinic consult today 12/11/2024. Patient stated he's not feeling well and will call IR back when ready to schedule.

## 2024-12-11 NOTE — TELEPHONE ENCOUNTER
Returned patient's call.  Patient reports he is sick and missed his IR appointment today.  Advised that I will let Dr. Mariee know.

## 2024-12-13 ENCOUNTER — TELEPHONE (OUTPATIENT)
Dept: INTERVENTIONAL RADIOLOGY/VASCULAR | Facility: CLINIC | Age: 59
End: 2024-12-13
Payer: COMMERCIAL

## 2024-12-18 ENCOUNTER — CLINICAL SUPPORT (OUTPATIENT)
Dept: INTERVENTIONAL RADIOLOGY/VASCULAR | Facility: CLINIC | Age: 59
End: 2024-12-18
Payer: COMMERCIAL

## 2024-12-18 DIAGNOSIS — N20.0 KIDNEY STONES: ICD-10-CM

## 2024-12-18 DIAGNOSIS — N13.1 HYDRONEPHROSIS DUE TO OBSTRUCTION OF URETER: Primary | ICD-10-CM

## 2024-12-18 PROCEDURE — 99204 OFFICE O/P NEW MOD 45 MIN: CPT | Mod: 95,,,

## 2024-12-18 NOTE — PROGRESS NOTES
Subjective     Patient ID: Josh Ruth is a 59 y.o. male.    Chief Complaint: right sided back pain    59 y.o. male referral from Dr. Mariee to set up patient for a right RCN for kidney stones/hydronephrosis for surgical planning and rest purposes.  He is s/p right open ureterolithotomy with Dr. Gamboa and staged ureteroscopy with Dr. Ruth. He reports Right sided back/flank pain, otherwise, doing well. He is recovering from RSV.     PMH and medications reviewed.   Not taking any blood thinners.  No CPAP Or O2 use at home. Will schedule with moderate sedation (Patient declined general. Stated he his able to hold his pain medications prior to the procedure if needed).   Dr. Mariee. clinic note reviewed.   Review of Systems   Constitutional:  Negative for appetite change and fatigue.   Respiratory:  Positive for cough and shortness of breath.    Cardiovascular:  Negative for chest pain.   Gastrointestinal:  Negative for abdominal pain.   Genitourinary:  Negative for difficulty urinating and hematuria.   Musculoskeletal:  Positive for back pain (right).   Neurological:  Negative for facial asymmetry and speech difficulty.   Psychiatric/Behavioral:  Negative for behavioral problems and confusion.         Objective     Physical Exam  Constitutional:       Appearance: Normal appearance.      Comments: Virtual visit.    HENT:      Head: Atraumatic.      Nose: Nose normal.   Eyes:      Conjunctiva/sclera: Conjunctivae normal.   Pulmonary:      Effort: Pulmonary effort is normal. No respiratory distress.   Neurological:      General: No focal deficit present.      Mental Status: He is alert and oriented to person, place, and time.   Psychiatric:         Mood and Affect: Mood normal.         Behavior: Behavior normal.        Assessment and Plan     1. Hydronephrosis due to obstruction of ureter  -     IR Nephrostomy Tube Placement; Future; Expected date: 12/18/2024  -     CBC Auto Differential; Future; Expected  date: 12/18/2024  -     Protime-INR; Future; Expected date: 12/18/2024    2. Kidney stones  -     IR Nephrostomy Tube Placement; Future; Expected date: 12/18/2024  -     CBC Auto Differential; Future; Expected date: 12/18/2024  -     Protime-INR; Future; Expected date: 12/18/2024    - Approved by Dr. Hirsch. Right PCN for mild hydro with ureteral stone/surgical clips. Urology planning for nephrostogram after 1 month to delineate anatomy for surgical planning.   - Patient reports to previously having a neph tube for 2 months placed by an outside provider.   - Discussed how the procedure will be performed, risks (including, but not limited to, pain, bleeding, infection, damage to nearby structures, and the need for additional procedures), benefits, possible complications, pre-post procedure expectations, and alternatives. The patient voices understanding and all questions have been answered.  The patient agrees to proceed as planned. Patient scheduled for 12/27/2024. Pre-procedure handout with clinic phone number provided.     The patient location is: [place]  The chief complaint leading to consultation is: right sided back pain     Visit type: audiovisual    30 minutes of total time spent on the encounter, which includes face to face time and non-face to face time preparing to see the patient (eg, review of tests), Obtaining and/or reviewing separately obtained history, Documenting clinical information in the electronic or other health record, Independently interpreting results (not separately reported) and communicating results to the patient/family/caregiver, or Care coordination (not separately reported).     Each patient to whom he or she provides medical services by telemedicine is:  (1) informed of the relationship between the physician and patient and the respective role of any other health care provider with respect to management of the patient; and (2) notified that he or she may decline to receive  medical services by telemedicine and may withdraw from such care at any time.    Emma Madera PA-C  Interventional Radiology  [phone]

## 2024-12-23 ENCOUNTER — LAB VISIT (OUTPATIENT)
Dept: LAB | Facility: HOSPITAL | Age: 59
End: 2024-12-23
Payer: COMMERCIAL

## 2024-12-23 DIAGNOSIS — N20.0 KIDNEY STONES: ICD-10-CM

## 2024-12-23 DIAGNOSIS — N13.1 HYDRONEPHROSIS DUE TO OBSTRUCTION OF URETER: ICD-10-CM

## 2024-12-23 LAB
BASOPHILS # BLD AUTO: 0.03 K/UL (ref 0–0.2)
BASOPHILS NFR BLD: 0.3 % (ref 0–1.9)
DIFFERENTIAL METHOD BLD: ABNORMAL
EOSINOPHIL # BLD AUTO: 0.1 K/UL (ref 0–0.5)
EOSINOPHIL NFR BLD: 1 % (ref 0–8)
ERYTHROCYTE [DISTWIDTH] IN BLOOD BY AUTOMATED COUNT: 12.6 % (ref 11.5–14.5)
HCT VFR BLD AUTO: 41.8 % (ref 40–54)
HGB BLD-MCNC: 14.6 G/DL (ref 14–18)
IMM GRANULOCYTES # BLD AUTO: 0.05 K/UL (ref 0–0.04)
IMM GRANULOCYTES NFR BLD AUTO: 0.4 % (ref 0–0.5)
INR PPP: 1 (ref 0.8–1.2)
LYMPHOCYTES # BLD AUTO: 3.3 K/UL (ref 1–4.8)
LYMPHOCYTES NFR BLD: 28.2 % (ref 18–48)
MCH RBC QN AUTO: 31.1 PG (ref 27–31)
MCHC RBC AUTO-ENTMCNC: 34.9 G/DL (ref 32–36)
MCV RBC AUTO: 89 FL (ref 82–98)
MONOCYTES # BLD AUTO: 0.7 K/UL (ref 0.3–1)
MONOCYTES NFR BLD: 5.9 % (ref 4–15)
NEUTROPHILS # BLD AUTO: 7.6 K/UL (ref 1.8–7.7)
NEUTROPHILS NFR BLD: 64.2 % (ref 38–73)
NRBC BLD-RTO: 0 /100 WBC
PLATELET # BLD AUTO: 296 K/UL (ref 150–450)
PMV BLD AUTO: 8.7 FL (ref 9.2–12.9)
PROTHROMBIN TIME: 10.9 SEC (ref 9–12.5)
RBC # BLD AUTO: 4.69 M/UL (ref 4.6–6.2)
WBC # BLD AUTO: 11.77 K/UL (ref 3.9–12.7)

## 2024-12-23 PROCEDURE — 85610 PROTHROMBIN TIME: CPT | Mod: PO

## 2024-12-23 PROCEDURE — 85025 COMPLETE CBC W/AUTO DIFF WBC: CPT | Mod: PO

## 2024-12-23 PROCEDURE — 36415 COLL VENOUS BLD VENIPUNCTURE: CPT | Mod: PO

## 2024-12-26 ENCOUNTER — TELEPHONE (OUTPATIENT)
Dept: INTERVENTIONAL RADIOLOGY/VASCULAR | Facility: HOSPITAL | Age: 59
End: 2024-12-26
Payer: COMMERCIAL

## 2024-12-27 ENCOUNTER — HOSPITAL ENCOUNTER (OUTPATIENT)
Dept: INTERVENTIONAL RADIOLOGY/VASCULAR | Facility: HOSPITAL | Age: 59
Discharge: HOME | End: 2024-12-27
Payer: COMMERCIAL

## 2024-12-27 VITALS
HEART RATE: 56 BPM | DIASTOLIC BLOOD PRESSURE: 83 MMHG | TEMPERATURE: 98 F | WEIGHT: 125 LBS | RESPIRATION RATE: 20 BRPM | SYSTOLIC BLOOD PRESSURE: 142 MMHG | OXYGEN SATURATION: 97 % | BODY MASS INDEX: 19.62 KG/M2 | HEIGHT: 67 IN

## 2024-12-27 DIAGNOSIS — N13.1 HYDRONEPHROSIS DUE TO OBSTRUCTION OF URETER: ICD-10-CM

## 2024-12-27 DIAGNOSIS — N20.0 KIDNEY STONES: ICD-10-CM

## 2024-12-27 DIAGNOSIS — N13.30 HYDRONEPHROSIS: ICD-10-CM

## 2024-12-27 PROCEDURE — 99153 MOD SED SAME PHYS/QHP EA: CPT

## 2024-12-27 PROCEDURE — 99152 MOD SED SAME PHYS/QHP 5/>YRS: CPT | Mod: ,,, | Performed by: RADIOLOGY

## 2024-12-27 PROCEDURE — 99152 MOD SED SAME PHYS/QHP 5/>YRS: CPT

## 2024-12-27 PROCEDURE — 50432 PLMT NEPHROSTOMY CATHETER: CPT | Mod: RT | Performed by: RADIOLOGY

## 2024-12-27 PROCEDURE — C1729 CATH, DRAINAGE: HCPCS

## 2024-12-27 PROCEDURE — 99153 MOD SED SAME PHYS/QHP EA: CPT | Performed by: RADIOLOGY

## 2024-12-27 PROCEDURE — 50432 PLMT NEPHROSTOMY CATHETER: CPT | Mod: RT,,, | Performed by: RADIOLOGY

## 2024-12-27 PROCEDURE — C1894 INTRO/SHEATH, NON-LASER: HCPCS

## 2024-12-27 PROCEDURE — 63600175 PHARM REV CODE 636 W HCPCS: Performed by: PHYSICIAN ASSISTANT

## 2024-12-27 PROCEDURE — 63600175 PHARM REV CODE 636 W HCPCS: Performed by: RADIOLOGY

## 2024-12-27 PROCEDURE — 99152 MOD SED SAME PHYS/QHP 5/>YRS: CPT | Performed by: RADIOLOGY

## 2024-12-27 PROCEDURE — C1769 GUIDE WIRE: HCPCS

## 2024-12-27 RX ORDER — CIPROFLOXACIN 2 MG/ML
400 INJECTION, SOLUTION INTRAVENOUS ONCE
Status: COMPLETED | OUTPATIENT
Start: 2024-12-27 | End: 2024-12-27

## 2024-12-27 RX ORDER — LIDOCAINE HYDROCHLORIDE 10 MG/ML
INJECTION, SOLUTION INFILTRATION; PERINEURAL
Status: COMPLETED | OUTPATIENT
Start: 2024-12-27 | End: 2024-12-27

## 2024-12-27 RX ORDER — MIDAZOLAM HYDROCHLORIDE 1 MG/ML
INJECTION, SOLUTION INTRAMUSCULAR; INTRAVENOUS
Status: COMPLETED | OUTPATIENT
Start: 2024-12-27 | End: 2024-12-27

## 2024-12-27 RX ORDER — FENTANYL CITRATE 50 UG/ML
INJECTION, SOLUTION INTRAMUSCULAR; INTRAVENOUS
Status: COMPLETED | OUTPATIENT
Start: 2024-12-27 | End: 2024-12-27

## 2024-12-27 RX ADMIN — MIDAZOLAM 1 MG: 1 INJECTION INTRAMUSCULAR; INTRAVENOUS at 08:12

## 2024-12-27 RX ADMIN — CIPROFLOXACIN 400 MG: 2 INJECTION, SOLUTION INTRAVENOUS at 07:12

## 2024-12-27 RX ADMIN — FENTANYL CITRATE 50 MCG: 50 INJECTION INTRAMUSCULAR; INTRAVENOUS at 08:12

## 2024-12-27 RX ADMIN — LIDOCAINE HYDROCHLORIDE 5 ML: 10 INJECTION, SOLUTION INFILTRATION; PERINEURAL at 08:12

## 2024-12-27 NOTE — PROCEDURES
22-Feb-2022 15:26 Radiology Post-Procedure Note    Pre Op Diagnosis: Nephrolithiasis  Post Op Diagnosis: Same    Procedure: R PCN placement    Procedure performed by: Mesfin Reyes MD    Written Informed Consent Obtained: Yes  Specimen Removed: NO  Estimated Blood Loss: Minimal    Findings:   Severe right hydronephrosis, 10 Fr PCN placed without complication.     Patient tolerated procedure well.    Leave catheter to bag drainage.   Patient to return to IR in 1 month for antegrade nephrostogram for surgical planning as per Urology.     Mesfin Reyes MD  Interventional Radiology  Department of Radiology

## 2024-12-27 NOTE — SEDATION DOCUMENTATION
IR procedure - Right Nephrostomy Tube Placement - is completed. Patient tolerated well. He is awake, alert, and oriented. Vital signs are stable. Patient will continue sedation monitoring until 9:06 and then proceed to IR pre/post to complete the prescribed recovery time of one hour. Anticipated discharge is 10:00.

## 2024-12-27 NOTE — DISCHARGE INSTRUCTIONS
Nephrostomy Tube Home Care     What is a nephrostomy tube?   Kidney stones, kidney infection, trauma, or other reasons can cause problems with urine leaving the body normally. When this happens, your provider may recommend a nephrostomy tube to drain urine from your kidney to a bag outside your body. A nephrostomy tube is a small, flexible tube that goes through the skin directly into the kidney. You may have one tube or two depending on if one or both kidneys are affected.     Nephrostomy tubes help drain the urine from the body in two ways:   1. PCN: An external drain allows urine to drain outside of your body into a collection bag.   2. PCNU: Allows urine to drain into the bladder as well as outside your body into a collection bag.     Follow Up  You should be following up with your referring provider/primary care provider while the tube is in place. The tube will stay in for at least 4 weeks, unless surgery is planned to correct your kidney problem. Generally, the tube(s) are replaced every 8-12 weeks. If you do not have an appointment and feel it is time for the tube to be replaced, please call [phone].     Will my diet or daily activity be limited by my nephrostomy tube?    Continue with your regular diet and prescribed medications, unless otherwise specified on your discharge instructions. For general questions about your diet and medication, contact your primary care physician.    Avoid any activity that may result in pulling on the drain.    Do not submerge the drain. This means do not swim, sit in a hot tub, soak in a tub bath, or do anything that involves putting the drain under water.     How do I take care of my nephrostomy tube?   Empty the collection bag into the toilet when it is half full, or at least once each day. Your provider will let you know if you should keep track of the amount.   1. Turn the knob at the bottom of the bag and drain into toilet. If your provider has told you to track  the contents, empty the bag into a measuring container and record the amount.   2. Wash your hands.   3. Change outside tube and bag weekly. These can be bought from any medical supplies store.     Can I shower?   While it is okay to shower with your tube(s), do not soak in any water with your tube(s). Soaking may lead to infection.    First 2 weeks: Before taking a shower, cover the dressing with a double layer of plastic wrap (like Saran wrap) where it enters the skin. Tape down the edges. After the shower, remove the plastic and apply a clean bandage.    After 2 weeks: You may shower without the plastic wrap. Rinse well. Pat the area dry and apply a clean bandage.     Do I need to change the bandage?   Change your dressing at least every 2 days, or if it becomes dirty or wet.   1. Wash your hands.   2. Remove the old bandage carefully. Try not to pull on the drain or stitches.   3. If the skin needs to be cleaned, use a gauze or cotton swab to gently clean it with soap and water.   4. Wait for your skin to dry.   5. Apply a sterile 4x4 gauze over the tube where it enters your body. Be careful not to kink the drain.   6. Secure with paper tape.   7. Wash your hands.     Call your provider immediately or seek emergency medical attention if you experience any of the following symptoms:    Fever/chills    New or worsening belly pain    Nausea or vomiting    New or worsening redness or swelling where the drain meets the skin    Pus leaking around the drain    Abrupt change in amount of urine in the bag or when you urinate    The drain begins to leak, breaks, or falls out     Troubleshooting   If the tube falls out   1. Cover the hole in the skin with gauze pads and tape.   2. Call the Interventional Radiology Clinic for instructions on how to get the tube replaced.    [phone], choose prompt 3,  Monday - Friday, 8:00 am - 4:00 pm    [phone] After hours and on holidays. Ask to speak with the  interventional radiologist on call.       Flushing Drains   If you are told to flush drains, you will need to flush each of your urinary drains with 10 cc of saline each day.   1. Gather supplies: 10 mL of sterile normal saline solution, syringe, sterile gauze, paper tape, and rubbing alcohol (liquid or single-use alcohol wipes).   2. Wash your hands.   3. Unscrew the drainage bag from drain (place a towel beneath to catch any drips).   4. Wipe drain with alcohol.   5. Fill the syringe with 10 mL of normal saline solution.   6. Attach syringe to drain.   7. Gently inject normal saline into drain. Do NOT pull back on the syringe.   8. Unscrew syringe.   9. Reattach drainage bag.   10. Wash your hands and dispose of supplies.     Capping Drains  If you are told to cap your drain You should cap the drain 24 hours after the procedure. To cap drain follow these instructions:  Turn stop cock to off position.  Unscrew bag from drain, while leaving stop cock in position.  Keep drainage bag in case it is needed in the future.    If you experience any of the following symptoms, try uncapping the drain and attaching the drainage bag. If this does not improve your symptoms, call your provider immediately or seek emergency medical attention.    New or worsening yellowing of your skin/eyes    Fever    New or worsening belly pain

## 2024-12-27 NOTE — H&P
Radiology History & Physical      SUBJECTIVE:     Chief Complaint: right hydronephrosis    History of Present Illness:  Josh Ruth is a 59 y.o. male who presents for R PCN placement.  Past Medical History:   Diagnosis Date    Arthritis     Chronic low back pain     Hypertension     Kidney stones     Parathyroid disorder 08/2024    Renal disorder     hydronephrosis     Past Surgical History:   Procedure Laterality Date    CYSTOSCOPIC LITHOLAPAXY  7/3/2024    Procedure: CYSTOLITHOLAPAXY;  Surgeon: Paul Ruth MD;  Location: [facility] OR;  Service: Urology;;    CYSTOSCOPY W/ LASER LITHOTRIPSY      CYSTOSCOPY W/ URETERAL STENT REMOVAL Right 8/7/2024    Procedure: CYSTOSCOPY, WITH URETERAL STENT REMOVAL;  Surgeon: Paul Ruth MD;  Location: [facility] OR;  Service: Urology;  Laterality: Right;    CYSTOSCOPY WITH CALCULUS EXTRACTION Right 7/3/2024    Procedure: CYSTOSCOPY, WITH CALCULUS REMOVAL;  Surgeon: Paul Ruth MD;  Location: [facility] OR;  Service: Urology;  Laterality: Right;    CYSTOSCOPY WITH CALCULUS EXTRACTION Right 8/7/2024    Procedure: CYSTOSCOPY, WITH CALCULUS REMOVAL;  Surgeon: Paul Ruth MD;  Location: [facility] OR;  Service: Urology;  Laterality: Right;    CYSTOURETEROSCOPY WITH RETROGRADE PYELOGRAPHY AND INSERTION OF STENT INTO URETER Right 10/26/2023    Procedure: CYSTOURETEROSCOPY, WITH RETROGRADE PYELOGRAM AND URETERAL STENT INSERTION;  Surgeon: Paul Ruth MD;  Location: [facility] OR;  Service: Urology;  Laterality: Right;    CYSTOURETEROSCOPY WITH RETROGRADE PYELOGRAPHY AND INSERTION OF STENT INTO URETER Right 7/3/2024    Procedure: CYSTOURETEROSCOPY, WITH RETROGRADE PYELOGRAM AND URETERAL STENT INSERTION;  Surgeon: Paul Ruth MD;  Location: [facility] OR;  Service: Urology;  Laterality: Right;    CYSTOURETEROSCOPY WITH RETROGRADE PYELOGRAPHY AND INSERTION OF STENT INTO URETER Right 8/7/2024    Procedure: CYSTOURETEROSCOPY, WITH RETROGRADE PYELOGRAM AND URETERAL STENT INSERTION;  Surgeon: Paul Ruth  MD;  Location: [facility] OR;  Service: Urology;  Laterality: Right;    LASER LITHOTRIPSY Right 10/26/2023    Procedure: LITHOTRIPSY, USING LASER-THULIUM;  Surgeon: Paul Ruth MD;  Location: [facility] OR;  Service: Urology;  Laterality: Right;    LASER LITHOTRIPSY Right 7/3/2024    Procedure: LITHOTRIPSY, USING LASER, Thulium;  Surgeon: Paul Ruth MD;  Location: [facility] OR;  Service: Urology;  Laterality: Right;    LASER LITHOTRIPSY Right 8/7/2024    Procedure: LITHOTRIPSY, USING LASER - Thulium;  Surgeon: Paul Ruth MD;  Location: [facility] OR;  Service: Urology;  Laterality: Right;    LUMBAR FUSION  12/2019       Home Meds:   Prior to Admission medications    Medication Sig Start Date End Date Taking? Authorizing Provider   cholecalciferol, vitamin D3, 1,250 mcg (50,000 unit) capsule Take 50,000 Units by mouth every 7 days. 6/18/24  Yes Provider, Historical   gabapentin (NEURONTIN) 800 MG tablet Take 800 mg by mouth 3 (three) times daily as needed. 10/18/23  Yes Provider, Historical   oxyCODONE-acetaminophen (PERCOCET)  mg per tablet Take 1 tablet by mouth every 4 (four) hours. 11/20/24  Yes Provider, Historical   propranoloL (INDERAL) 10 MG tablet Take 10 mg by mouth 2 (two) times daily.   Yes Provider, Historical     Anticoagulants/Antiplatelets: no anticoagulation    Allergies:   Review of patient's allergies indicates:   Allergen Reactions    Pcn [penicillins] Hives     Sedation History:  no adverse reactions          OBJECTIVE:     Vital Signs (Most Recent)  Temp: 98.1 °F (36.7 °C) (12/27/24 0729)  Pulse: 62 (12/27/24 0850)  Resp: 12 (12/27/24 0850)  BP: (!) 141/63 (12/27/24 0850)  SpO2: 100 % (12/27/24 0850)    Physical Exam:  ASA: 2  Mallampati: 2    General: no acute distress  Mental Status: alert and oriented to person, place and time  HEENT: normocephalic, atraumatic  Chest: unlabored breathing  Heart: regular heart rate  Abdomen: nondistended  Extremity: moves all extremities    Laboratory  Lab Results    Component Value Date    INR 1.0 12/23/2024       Lab Results   Component Value Date    WBC 11.77 12/23/2024    HGB 14.6 12/23/2024    HCT 41.8 12/23/2024    MCV 89 12/23/2024     12/23/2024      Lab Results   Component Value Date     (H) 10/28/2023     (L) 10/28/2023    K 4.0 10/28/2023     10/28/2023    CO2 27 10/28/2023    BUN 14 10/28/2023    CREATININE 1.0 11/11/2024    CALCIUM 10.5 (H) 10/28/2023    ALT 18 10/28/2023    AST 28 10/28/2023    ALBUMIN 3.8 10/28/2023    BILITOT 0.7 10/28/2023       ASSESSMENT/PLAN:     Sedation Plan: moderate  Patient will undergo right PCN placement.    Mesfin Reyes MD  Interventional Radiology  Department of Radiology

## 2025-01-02 ENCOUNTER — TELEPHONE (OUTPATIENT)
Dept: UROLOGY | Facility: CLINIC | Age: 60
End: 2025-01-02
Payer: COMMERCIAL

## 2025-01-02 DIAGNOSIS — N13.5 URETERAL STRICTURE, RIGHT: Primary | ICD-10-CM

## 2025-01-07 NOTE — ANESTHESIA PAT ROS NOTE
01/07/2025  Josh Ruth is a 59 y.o., male.      Pre-op Assessment          Review of Systems  Anesthesia Hx:  No problems with previous Anesthesia   History of prior surgery of interest to airway management or planning:  Previous anesthesia: General 08/07/2024 Cystoureterscopy with Retrograde Pyelogram and Ureteral Stent Insertion (R) Urerter Lithotripsy Cystosocopy with Stent Removal and Calculus Removal with general anesthesia.  Procedure performed at an [facility].       Denies Family Hx of Anesthesia complications.    Denies Personal Hx of Anesthesia complications.                    Social:  Non-Smoker, No Alcohol Use       Hematology/Oncology:  Hematology Normal   Oncology Normal                                   EENT/Dental:  EENT/Dental Normal           Cardiovascular:     Hypertension, well controlled   Denies MI.        Denies Angina.                                    Pulmonary:       Denies Shortness of breath.   Denies Sleep Apnea.                Renal/:  Chronic Renal Disease renal calculi               Hepatic/GI:  Hepatic/GI Normal                    Musculoskeletal:   Musculoskeletal General/Symptoms: low back pain.  Back Surgery 2019           Neurological:    Denies CVA.    Denies Seizures.                                Endocrine:  Endocrine Normal            Dermatological:  Skin Normal    Psych:  Psychiatric Normal                         Anesthesia Assessment: Preoperative EQUATION    Planned Procedure: Procedure(s) (LRB):  CYSTOSCOPY (N/A)  CYSTOSCOPY, WITH RETROGRADE PYELOGRAM (Right)  Nephrostogram - antegrade (Right)  Requested Anesthesia Type:Monitor Anesthesia Care  Surgeon: Mesfin Mariee MD  Service: Urology  Known or anticipated Date of Surgery:1/23/2025    Surgeon notes: reviewed    Electronic QUestionnaire Assessment completed via nurse interview  with patient.        Triage considerations:     The patient has no apparent active cardiac condition (No unstable coronary Syndrome such as severe unstable angina or recent [<1 month] myocardial infarction, decompensated CHF, severe valvular   disease or significant arrhythmia)    Previous anesthesia records:GETA and No problems  08/07/2024 Cystourethroscopy with Retrograde Pyelogram and Ureteral Stent Insertion (R) ureter , LIthotripsy Cystoscopy with Stent removal and Calculus removal  Airway:  Mouth Opening: Normal  TM Distance: Normal  Neck ROM: Normal ROM            Last PCP note: > 1 year ago , outside [facility]   Subspecialty notes:  none noted     Other important co-morbidities:  none noted        Tests already available:  Available tests,  within 1 month , within [facility] .   12/23/2024 CBC            Instructions given. (See in Nurse's note)    Optimization:  Anesthesia Preop Clinic Assessment  Indicated not required     Medical Opinion Indicated not required            Plan:    Testing:  None Needed    Navigation:             Straight Line to surgery.               No tests, anesthesia preop clinic visit, or consult required.      01/07/2025 Case discussed with Dr. Alan no prior PCP visit since 2019 but recently underwent an anesthesia event for Cystoscopy 08/07/2024 with no complications.  .Dr. Alan reviewed case and advised that patient can proceed straight line to surgery  without the need for further medical clearance or testing.

## 2025-01-17 ENCOUNTER — TELEPHONE (OUTPATIENT)
Dept: INTERVENTIONAL RADIOLOGY/VASCULAR | Facility: CLINIC | Age: 60
End: 2025-01-17
Payer: COMMERCIAL

## 2025-01-22 ENCOUNTER — TELEPHONE (OUTPATIENT)
Dept: UROLOGY | Facility: CLINIC | Age: 60
End: 2025-01-22
Payer: COMMERCIAL

## 2025-01-22 NOTE — TELEPHONE ENCOUNTER
Spoke with patient.  Notified that his procedure scheduled for tomorrow will be postponed due to weather.  Patient verbalized understanding.

## 2025-01-27 ENCOUNTER — OFFICE VISIT (OUTPATIENT)
Dept: SURGERY | Facility: CLINIC | Age: 60
End: 2025-01-27
Payer: COMMERCIAL

## 2025-01-27 ENCOUNTER — OFFICE VISIT (OUTPATIENT)
Dept: ENDOCRINOLOGY | Facility: CLINIC | Age: 60
End: 2025-01-27
Payer: COMMERCIAL

## 2025-01-27 VITALS
BODY MASS INDEX: 19.93 KG/M2 | RESPIRATION RATE: 18 BRPM | DIASTOLIC BLOOD PRESSURE: 84 MMHG | HEIGHT: 67 IN | HEART RATE: 74 BPM | WEIGHT: 127 LBS | SYSTOLIC BLOOD PRESSURE: 120 MMHG

## 2025-01-27 DIAGNOSIS — E21.3 HYPERPARATHYROIDISM: Primary | ICD-10-CM

## 2025-01-27 DIAGNOSIS — N20.0 KIDNEY STONES: ICD-10-CM

## 2025-01-27 PROCEDURE — 99204 OFFICE O/P NEW MOD 45 MIN: CPT | Mod: S$GLB,,, | Performed by: SURGERY

## 2025-01-27 PROCEDURE — 3074F SYST BP LT 130 MM HG: CPT | Mod: CPTII,S$GLB,, | Performed by: INTERNAL MEDICINE

## 2025-01-27 PROCEDURE — 99999 PR PBB SHADOW E&M-EST. PATIENT-LVL V: CPT | Mod: PBBFAC,,, | Performed by: INTERNAL MEDICINE

## 2025-01-27 PROCEDURE — 3008F BODY MASS INDEX DOCD: CPT | Mod: CPTII,S$GLB,, | Performed by: INTERNAL MEDICINE

## 2025-01-27 PROCEDURE — 1159F MED LIST DOCD IN RCRD: CPT | Mod: CPTII,S$GLB,, | Performed by: INTERNAL MEDICINE

## 2025-01-27 PROCEDURE — 99999 PR PBB SHADOW E&M-EST. PATIENT-LVL II: CPT | Mod: PBBFAC,,, | Performed by: SURGERY

## 2025-01-27 PROCEDURE — 3079F DIAST BP 80-89 MM HG: CPT | Mod: CPTII,S$GLB,, | Performed by: INTERNAL MEDICINE

## 2025-01-27 PROCEDURE — 99204 OFFICE O/P NEW MOD 45 MIN: CPT | Mod: S$GLB,,, | Performed by: INTERNAL MEDICINE

## 2025-01-27 PROCEDURE — 1160F RVW MEDS BY RX/DR IN RCRD: CPT | Mod: CPTII,S$GLB,, | Performed by: INTERNAL MEDICINE

## 2025-01-27 PROCEDURE — G2211 COMPLEX E/M VISIT ADD ON: HCPCS | Mod: S$GLB,,, | Performed by: INTERNAL MEDICINE

## 2025-01-27 NOTE — PROGRESS NOTES
Consult Note  Endocrine Surgery    Visit Diagnosis: Hyperparathyroidism [E21.3]    SUBJECTIVE:     Patient is a 59 y.o. male who was referred by Dr. Mesfin Mariee and is here unaccompanied and presents for evaluation of primary hyperparathyroidism. The patient has had complaints of elevation of the serum calcium and parathormone and history of nephrolithiasis. His calcium had been as high as 10.5 in our lab though he mentions have higher levels previously.  There is no history of lithium or thiazide medication use. He has not had previous history of head or neck radiation. He admits several surgical procedures for the recurrent stones(also scheduled this week with Dr. Mariee). He denies sleep disturbance, joint pain, mood changes, abdominal pain, constipation, weakness, and lethargy. Furthermore, there is no history of pathologic fractures, malignancy, or personal history of thyroid, adrenal, or pancreatic abnormalities. The patient is vitamin D unclear. He is not on calcium supplementation. He does not have familial history of endocrinopathies.    Moises Steele in 2020 ([facility] in [facility]) and [place] 2022    Review of patient's allergies indicates:   Allergen Reactions    Pcn [penicillins] Hives       Current Outpatient Medications   Medication Sig Dispense Refill    cholecalciferol, vitamin D3, 1,250 mcg (50,000 unit) capsule Take 50,000 Units by mouth every 7 days.      gabapentin (NEURONTIN) 800 MG tablet Take 800 mg by mouth 3 (three) times daily as needed.      oxyCODONE-acetaminophen (PERCOCET)  mg per tablet Take 1 tablet by mouth every 4 (four) hours.      propranoloL (INDERAL) 10 MG tablet Take 10 mg by mouth 2 (two) times daily.       No current facility-administered medications for this visit.     Facility-Administered Medications Ordered in Other Visits   Medication Dose Route Frequency Provider Last Rate Last Admin    lactated ringers infusion   Intravenous Continuous Rick Barron MD 20  mL/hr at 10/26/23 0834 New Bag at 10/26/23 0834       Past Medical History:   Diagnosis Date    Arthritis     Chronic low back pain     Hypertension     Kidney stones     Parathyroid disorder 08/2024    Renal disorder     hydronephrosis     Past Surgical History:   Procedure Laterality Date    CYSTOSCOPIC LITHOLAPAXY  7/3/2024    Procedure: CYSTOLITHOLAPAXY;  Surgeon: Paul Ruth MD;  Location: [facility] OR;  Service: Urology;;    CYSTOSCOPY W/ LASER LITHOTRIPSY      CYSTOSCOPY W/ URETERAL STENT REMOVAL Right 8/7/2024    Procedure: CYSTOSCOPY, WITH URETERAL STENT REMOVAL;  Surgeon: Paul Ruth MD;  Location: [facility] OR;  Service: Urology;  Laterality: Right;    CYSTOSCOPY WITH CALCULUS EXTRACTION Right 7/3/2024    Procedure: CYSTOSCOPY, WITH CALCULUS REMOVAL;  Surgeon: Paul Ruth MD;  Location: [facility] OR;  Service: Urology;  Laterality: Right;    CYSTOSCOPY WITH CALCULUS EXTRACTION Right 8/7/2024    Procedure: CYSTOSCOPY, WITH CALCULUS REMOVAL;  Surgeon: Paul Ruth MD;  Location: [facility];  Service: Urology;  Laterality: Right;    CYSTOURETEROSCOPY WITH RETROGRADE PYELOGRAPHY AND INSERTION OF STENT INTO URETER Right 10/26/2023    Procedure: CYSTOURETEROSCOPY, WITH RETROGRADE PYELOGRAM AND URETERAL STENT INSERTION;  Surgeon: Paul Ruth MD;  Location: [facility] OR;  Service: Urology;  Laterality: Right;    CYSTOURETEROSCOPY WITH RETROGRADE PYELOGRAPHY AND INSERTION OF STENT INTO URETER Right 7/3/2024    Procedure: CYSTOURETEROSCOPY, WITH RETROGRADE PYELOGRAM AND URETERAL STENT INSERTION;  Surgeon: Paul Ruth MD;  Location: [facility];  Service: Urology;  Laterality: Right;    CYSTOURETEROSCOPY WITH RETROGRADE PYELOGRAPHY AND INSERTION OF STENT INTO URETER Right 8/7/2024    Procedure: CYSTOURETEROSCOPY, WITH RETROGRADE PYELOGRAM AND URETERAL STENT INSERTION;  Surgeon: Pual Ruth MD;  Location: [facility] OR;  Service: Urology;  Laterality: Right;    LASER LITHOTRIPSY Right 10/26/2023    Procedure: LITHOTRIPSY, USING  LASER-THULIUM;  Surgeon: Paul Ruth MD;  Location: [facility];  Service: Urology;  Laterality: Right;    LASER LITHOTRIPSY Right 7/3/2024    Procedure: LITHOTRIPSY, USING LASER, Thulium;  Surgeon: Paul Ruth MD;  Location: [facility];  Service: Urology;  Laterality: Right;    LASER LITHOTRIPSY Right 8/7/2024    Procedure: LITHOTRIPSY, USING LASER - Thulium;  Surgeon: Paul Ruth MD;  Location: [facility] OR;  Service: Urology;  Laterality: Right;    LUMBAR FUSION  12/2019     Social History     Tobacco Use    Smoking status: Never     Passive exposure: Never    Smokeless tobacco: Never   Substance Use Topics    Alcohol use: Not Currently    Drug use: Yes     Types: Marijuana     Comment: weekly          Review of Systems:    Review of Systems   All other systems reviewed and are negative.        OBJECTIVE:     Vital Signs:  See endocrine note for full vitals.    Physical Exam    General:  no distress, see vitals for BMI    Eyes:  conjunctivae/corneas clear   Neck: trachea midline and symmetric, no adenopathy    Thyroid:  thyroid not enlarged   Lung: clear to auscultation bilaterally   Heart:  regular rate and rhythm   Abdomen: soft, non-tender; bowel sounds normal; no masses,  no organomegaly   Skin/Extremities: warm and well-perfused   Pulses: 2+ and symmetric   Neuro: normal without focal findings and mental status, speech normal, alert and oriented x3       Imaging    Studies:  Date:       Results:     DEXA:    Pending   Ultrasound(outside):  5/22/22 Findings:   The right lobe of the thyroid measures 5.7 x 1.6 x 1.9 cm with an estimated volume of 9.0 cc and the left 6.0 x 1.3 x 2.1 cm with an estimated volume of 7.9 cc.    The isthmus is approximately 0.3 cm in thickness.     The thyroid parenchyma is homogeneous with normal parenchymal vascularity.     Nodules:   There is a solid-appearing hypoechoic nodule in the posterior aspect of the mid right thyroid lobe measuring 8 x 7 x 4 mm. TR 4.     There is a septated  colloid cyst in the lateral mid left thyroid lobe measuring 13 mm.   There is a spongiform nodule in the posterior mid left thyroid lobe measuring 9 mm.   There is a hypoechoic nodule which appears solid in the anterior aspect of the lower left thyroid lobe measuring 8 x 8 x 6 mm. TR 4.     No masses are seen posterior to the thyroid gland.     There is no cervical adenopathy.    Sestamebi/Parathyroid scan(outside):  5/22/22 Findings:  On the 20-minute image, there is physiologic uptake of radiotracer within the thyroid gland, salivary glands, and parotid glands.  On the three hour delayed image, there is washout of radiotracer from the thyroid gland with minimal persistent diffuse parenchymal activity noted.  There is no evidence of focal persistent activity to suggest a parathyroid adenoma.    Stone analysis:   7/2024 and 8/2024 10% calcium oxalate dihydrate, and   90% calcium monohydrogen phosphate dihydrate (brushite).  [Findings same for each analysis].       Lab Review    24-Hour Urine Collection:  pending           Component Value Date    PTH, Intact 69.9 10/22/2024    Calcium 10.5 (H) 10/28/2023           ASSESSMENT/PLAN:       Assessment    Josh Ruth is an 59 y.o. male who presents with likely primary hyperparathyroidism. The above testing and examination support the diagnosis. Patient meets criteria for recommending parathyroid surgery. This is based on his history of nephrolithiasis. He currently does not have possible localization to the via imaging.       Plan    Imaging: will obtain a Dexa and Ultrasound with possible parathyroid CT scan pending results.  Will also follow-up on updated labs(renal panel, Vitamin D, and iPTH) and 24 urine collection.  Medications: patient should continue current medications.  The natural history and treatment options for primary hyperparathyroidism were discussed with the patient. Parathyroidectomy is the only curative treatment for primary  "hyperparathyroidism. Parathyroidectomy, both minimally invasive technique and standard four-gland exploration, and its risks were discussed. I was also able to duscuss the expected ladonna-operative course and the risks of surgery including failure to localize the parathyroid gland, persistent or recurrent hyperparathyroidism with the possibility of the need for a second surgery, temporary or permanent hypoparathyroidism resulting in low blood calcium levels that require extensive medication to avoid serious degenerative conditions such as cataracts, brittle bones, muscle weakness and muscle irritability. Other risks include bleeding, infection, injury to the recurrent laryngeal nerves resulting in hoarseness or impairment of speech and the risks of general anesthetic including MI, CVA, sudden death or even reaction to anesthetic medications.The role of intraoperative PTH monitoring was also discussed. The patient understands the risks, any and all questions were answered to the patients satisfaction. The patient is amenable to surgery. Will ensure that patient is medically optimized prior to procedure. In addition, the patient was given our "Understanding Parathyroid Disease" handout and directed to the [employer] Patient Education portal as a resource.       I spent a total of 61 minutes on the day of the visit.This includes face to face time and non-face to face time preparing to see the patient (eg, review of tests), obtaining and/or reviewing separately obtained history, documenting clinical information in the electronic or other health record, independently interpreting results and communicating results to the patient/family/caregiver, or care coordinator.   "

## 2025-01-27 NOTE — ASSESSMENT & PLAN NOTE
--Patient with history of hypercalcemia and elevated PTH in the setting of calcium nephrolithiasis  --Will get 24 hr urine to confirm but this seems consistent with primary hyperparathyroidism  --Will also check bone density of spine, hip and 1/3 distal radius  --He does meet surgical criteria should primary HPT be confirmed due to significant nephrolithiasis history  --Will also update neck ultrasound now  --May ultimately need CT neck parathyroid protocol  --He is also seeing endocrine surgery today

## 2025-01-27 NOTE — PROGRESS NOTES
"Subjective:      Patient ID: Josh Ruth is a 59 y.o. male.    Chief Complaint:  Hyperparathyroidism      History of Present Illness  Mr. Ruth presents for evaluation and management of hyperparathyroidism.     Has active history of nephrolithiasis.     Noted to have inappropriately normal PTH with elevated serum calcium since at least 10/2023.     Calcium, PTH, and Vit D levels:   Latest Reference Range & Units 08/31/23 15:02 10/28/23 17:34 10/22/24 15:09   Calcium 8.4 - 10.2 mg/dL 9.8 10.5 (H)    Albumin 3.5 - 5.2 g/dL 3.7 3.8    PTH 9.0 - 77.0 pg/mL   69.9       No history of fragility fracture. Had some traumatic fractures in his youth.     No bone density scan on file.     Has significant history of calcium nephrolithiasis. Has had multiple stone events staring in his 20's. GFR >60.     Has never taken lithium or HCTZ. No excessive calcium intake. Takes Vit D supplements 50k once weekly.     Not currently taking a PPI and has not taken a PPI chronically.     No family history of hypercalcemia or hyperparathyroidism. No personal history of elevated serum calcium at a young age.     No depression/low mood, bone pain, abdominal pain or constipation.      Had  normal NM parathyroid imaging at [facility] in 5/2022.     Neck ultrasound report from 5/2022:  1.   There is an 8 mm TR 4 nodule in the lower left thyroid lobe.   2.   There is no cervical adenopathy.   3.   There is no sonographic evidence of a parathyroid adenoma.     Review of Systems   Constitutional:  Negative for chills and fever.   Gastrointestinal:  Negative for nausea and vomiting.       Objective:   Physical Exam  Vitals and nursing note reviewed.       BP Readings from Last 3 Encounters:   01/27/25 120/84   12/27/24 (!) 142/83   11/27/24 121/80     Wt Readings from Last 1 Encounters:   01/27/25 1028 57.6 kg (126 lb 15.8 oz)       Body mass index is 19.89 kg/m².    Lab Review:   No results found for: "HGBA1C"  No results found for: "CHOL", " ""HDL", "LDLCALC", "TRIG", "CHOLHDL"  Lab Results   Component Value Date     (L) 10/28/2023    K 4.0 10/28/2023     10/28/2023    CO2 27 10/28/2023     (H) 10/28/2023    BUN 14 10/28/2023    CREATININE 1.0 11/11/2024    CALCIUM 10.5 (H) 10/28/2023    PROT 6.8 10/28/2023    ALBUMIN 3.8 10/28/2023    BILITOT 0.7 10/28/2023    ALKPHOS 106 10/28/2023    AST 28 10/28/2023    ALT 18 10/28/2023    ANIONGAP 5 10/28/2023         Assessment and Plan     Hyperparathyroidism  --Patient with history of hypercalcemia and elevated PTH in the setting of calcium nephrolithiasis  --Will get 24 hr urine to confirm but this seems consistent with primary hyperparathyroidism  --Will also check bone density of spine, hip and 1/3 distal radius  --He does meet surgical criteria should primary HPT be confirmed due to significant nephrolithiasis history  --Will also update neck ultrasound now  --May ultimately need CT neck parathyroid protocol  --He is also seeing endocrine surgery today      Kidney stones  --With confirmed calcium nephrolithiasis    24 hr urine for calicum and creatinine with labs morning drops specimen off (renal panel, PTH, vitamin D), bone density with spine, hip and wrist at [facility], ultrasound when able      Visit today included increased complexity associated with the care of the episodic problem hyperparathyroidism addressed and managing the longitudinal care of the patient due to the serious and/or complex managed problem(s).       Chai Shelley M.D. Staff Endocrinology     "

## 2025-01-29 ENCOUNTER — TELEPHONE (OUTPATIENT)
Dept: UROLOGY | Facility: CLINIC | Age: 60
End: 2025-01-29
Payer: COMMERCIAL

## 2025-01-29 ENCOUNTER — HOSPITAL ENCOUNTER (OUTPATIENT)
Dept: RADIOLOGY | Facility: HOSPITAL | Age: 60
Discharge: HOME | End: 2025-01-29
Attending: INTERNAL MEDICINE
Payer: COMMERCIAL

## 2025-01-29 DIAGNOSIS — E21.3 HYPERPARATHYROIDISM: ICD-10-CM

## 2025-01-29 PROCEDURE — 77092 TBS I&R FX RSK QHP: CPT | Mod: ,,, | Performed by: RADIOLOGY

## 2025-01-29 PROCEDURE — 77081 DXA BONE DENSITY APPENDICULR: CPT | Mod: TC,PO

## 2025-01-29 PROCEDURE — 77091 TBS TECHL CALCULATION ONLY: CPT | Mod: PO

## 2025-01-29 PROCEDURE — 77081 DXA BONE DENSITY APPENDICULR: CPT | Mod: 26,,, | Performed by: RADIOLOGY

## 2025-01-29 NOTE — TELEPHONE ENCOUNTER
Instructions for Day of Surgery at [facility]      Report To:    [facility], 1st floor of the hospital    Arrival time: 6:30am    Please note:     If you are allergic to any medication please let your care team know the day of surgery.  If you have ever had adverse reaction to anesthesia please let your care team know the day of surgery   Please arrange transportation from the hospital, you will not be allowed to drive yourself home from surgery since you have been under sedation or anesthesia   Notify your doctor or care team of any diabetic medications that you take as these may need to be held or adjusted prior to surgery     Before Surgery     You should stop taking any blood thinners for up to 7 days depending on the blood thinner, please let care team know what you are taking so you can be directed when to stop certain medications.    You should hold any aspirin containing products for 7 days    Stop taking any herbal supplements 14 days prior to surgery     Night Before Surgery     Nothing to eat or drink after midnight the night before your surgery  Take medications as instructed the morning of surgery  No alcoholic beverages 24 hours prior to surgery

## 2025-01-30 ENCOUNTER — HOSPITAL ENCOUNTER (OUTPATIENT)
Facility: HOSPITAL | Age: 60
Discharge: HOME | End: 2025-01-30
Attending: UROLOGY | Admitting: UROLOGY
Payer: COMMERCIAL

## 2025-01-30 ENCOUNTER — TELEPHONE (OUTPATIENT)
Dept: ENDOCRINOLOGY | Facility: CLINIC | Age: 60
End: 2025-01-30
Payer: COMMERCIAL

## 2025-01-30 ENCOUNTER — ANESTHESIA (OUTPATIENT)
Dept: SURGERY | Facility: HOSPITAL | Age: 60
End: 2025-01-30
Payer: COMMERCIAL

## 2025-01-30 ENCOUNTER — ANESTHESIA EVENT (OUTPATIENT)
Dept: SURGERY | Facility: HOSPITAL | Age: 60
End: 2025-01-30
Payer: COMMERCIAL

## 2025-01-30 VITALS
RESPIRATION RATE: 18 BRPM | HEIGHT: 67 IN | HEART RATE: 52 BPM | SYSTOLIC BLOOD PRESSURE: 151 MMHG | OXYGEN SATURATION: 100 % | TEMPERATURE: 98 F | DIASTOLIC BLOOD PRESSURE: 77 MMHG | BODY MASS INDEX: 19.78 KG/M2 | WEIGHT: 126 LBS

## 2025-01-30 DIAGNOSIS — N13.30 HYDRONEPHROSIS: Primary | ICD-10-CM

## 2025-01-30 PROCEDURE — 71000015 HC POSTOP RECOV 1ST HR: Performed by: UROLOGY

## 2025-01-30 PROCEDURE — 36000707: Performed by: UROLOGY

## 2025-01-30 PROCEDURE — C1758 CATHETER, URETERAL: HCPCS | Performed by: UROLOGY

## 2025-01-30 PROCEDURE — 63600175 PHARM REV CODE 636 W HCPCS

## 2025-01-30 PROCEDURE — 37000009 HC ANESTHESIA EA ADD 15 MINS: Performed by: UROLOGY

## 2025-01-30 PROCEDURE — 37000008 HC ANESTHESIA 1ST 15 MINUTES: Performed by: UROLOGY

## 2025-01-30 PROCEDURE — 71000044 HC DOSC ROUTINE RECOVERY FIRST HOUR: Performed by: UROLOGY

## 2025-01-30 PROCEDURE — 25500020 PHARM REV CODE 255: Performed by: UROLOGY

## 2025-01-30 PROCEDURE — 52005 CYSTO W/URTRL CATHJ: CPT | Mod: ,,, | Performed by: UROLOGY

## 2025-01-30 PROCEDURE — 63600175 PHARM REV CODE 636 W HCPCS: Performed by: NURSE ANESTHETIST, CERTIFIED REGISTERED

## 2025-01-30 PROCEDURE — 25000003 PHARM REV CODE 250: Performed by: NURSE ANESTHETIST, CERTIFIED REGISTERED

## 2025-01-30 PROCEDURE — 74420 UROGRAPHY RTRGR +-KUB: CPT | Mod: 26,,, | Performed by: UROLOGY

## 2025-01-30 PROCEDURE — 50431 NJX PX NFROSGRM &/URTRGRM: CPT | Mod: 51,RT,, | Performed by: UROLOGY

## 2025-01-30 PROCEDURE — 36000706: Performed by: UROLOGY

## 2025-01-30 RX ORDER — FENTANYL CITRATE 50 UG/ML
25 INJECTION, SOLUTION INTRAMUSCULAR; INTRAVENOUS EVERY 5 MIN PRN
Status: DISCONTINUED | OUTPATIENT
Start: 2025-01-30 | End: 2025-01-30 | Stop reason: HOSPADM

## 2025-01-30 RX ORDER — LIDOCAINE HYDROCHLORIDE 20 MG/ML
INJECTION INTRAVENOUS
Status: DISCONTINUED | OUTPATIENT
Start: 2025-01-30 | End: 2025-01-30

## 2025-01-30 RX ORDER — MIDAZOLAM HYDROCHLORIDE 1 MG/ML
INJECTION INTRAMUSCULAR; INTRAVENOUS
Status: DISCONTINUED | OUTPATIENT
Start: 2025-01-30 | End: 2025-01-30

## 2025-01-30 RX ORDER — PHENAZOPYRIDINE HYDROCHLORIDE 100 MG/1
100 TABLET, FILM COATED ORAL 3 TIMES DAILY PRN
Qty: 30 TABLET | Refills: 0 | Status: SHIPPED | OUTPATIENT
Start: 2025-01-30 | End: 2025-02-09

## 2025-01-30 RX ORDER — PROPOFOL 10 MG/ML
VIAL (ML) INTRAVENOUS
Status: DISCONTINUED | OUTPATIENT
Start: 2025-01-30 | End: 2025-01-30

## 2025-01-30 RX ORDER — CIPROFLOXACIN 2 MG/ML
400 INJECTION, SOLUTION INTRAVENOUS
Status: COMPLETED | OUTPATIENT
Start: 2025-01-30 | End: 2025-01-30

## 2025-01-30 RX ORDER — SODIUM CHLORIDE 9 MG/ML
INJECTION, SOLUTION INTRAVENOUS CONTINUOUS
Status: DISCONTINUED | OUTPATIENT
Start: 2025-01-30 | End: 2025-01-30 | Stop reason: HOSPADM

## 2025-01-30 RX ORDER — GLUCAGON 1 MG
1 KIT INJECTION
Status: DISCONTINUED | OUTPATIENT
Start: 2025-01-30 | End: 2025-01-30 | Stop reason: HOSPADM

## 2025-01-30 RX ORDER — FENTANYL CITRATE 50 UG/ML
INJECTION, SOLUTION INTRAMUSCULAR; INTRAVENOUS
Status: DISCONTINUED | OUTPATIENT
Start: 2025-01-30 | End: 2025-01-30

## 2025-01-30 RX ADMIN — LIDOCAINE HYDROCHLORIDE 60 MG: 20 INJECTION INTRAVENOUS at 08:01

## 2025-01-30 RX ADMIN — PROPOFOL 150 MCG/KG/MIN: 10 INJECTION, EMULSION INTRAVENOUS at 08:01

## 2025-01-30 RX ADMIN — CIPROFLOXACIN 400 MG: 2 INJECTION, SOLUTION INTRAVENOUS at 08:01

## 2025-01-30 RX ADMIN — PROPOFOL 70 MG: 10 INJECTION, EMULSION INTRAVENOUS at 08:01

## 2025-01-30 RX ADMIN — MIDAZOLAM HYDROCHLORIDE 2 MG: 2 INJECTION, SOLUTION INTRAMUSCULAR; INTRAVENOUS at 08:01

## 2025-01-30 RX ADMIN — SODIUM CHLORIDE: 9 INJECTION, SOLUTION INTRAVENOUS at 08:01

## 2025-01-30 RX ADMIN — FENTANYL CITRATE 25 MCG: 50 INJECTION, SOLUTION INTRAMUSCULAR; INTRAVENOUS at 08:01

## 2025-01-30 NOTE — ANESTHESIA PREPROCEDURE EVALUATION
01/30/2025  Josh Ruth is a 59 y.o., male.    Planned Procedure:  Procedure(s) (LRB):  CYSTOSCOPY (N/A)  CYSTOSCOPY, WITH RETROGRADE PYELOGRAM (Right)  Nephrostogram - antegrade (Right)    Diagnosis:  1. Hydronephrosis          Pre-op Assessment    I have reviewed the Patient Summary Reports.    I have reviewed the NPO Status.   I have reviewed the Medications.     Review of Systems  Social:  Former Smoker   Marijuana user      Cardiovascular:     Hypertension                                          Pulmonary:  Pulmonary Normal                       Renal/:     Pre-op diagnosis: History of kidney stones                   Physical Exam  General: Well nourished, Alert, Oriented and Cooperative    Airway:  Mouth Opening: Normal  TM Distance: Normal  Neck ROM: Normal ROM    Chest/Lungs:  Normal Respiratory Rate    Heart:  Rate: Normal        Anesthesia Plan  Type of Anesthesia, risks & benefits discussed:    Anesthesia Type: Gen Supraglottic Airway, Gen Natural Airway  Intra-op Monitoring Plan: Standard ASA Monitors  Post Op Pain Control Plan: multimodal analgesia and IV/PO Opioids PRN  Induction:  IV  Airway Plan: Video and Direct, Post-Induction  Informed Consent: Informed consent signed with the Patient and all parties understand the risks and agree with anesthesia plan.  All questions answered.   ASA Score: 2  Day of Surgery Review of History & Physical: H&P Update referred to the surgeon/provider.  Anesthesia Plan Notes: Dental injury information risk was given.  The patient is aware of the risk and understand the potential for dental injury.  Anesthesia risk and side effects were discussed.     Ready For Surgery From Anesthesia Perspective.     .

## 2025-01-30 NOTE — TELEPHONE ENCOUNTER
Called and spoke with pt regarding rescheduling Ultrasound. New Appointment scheduled for 02/20 @ 10:30

## 2025-01-30 NOTE — PLAN OF CARE
Patient ambulated to unit. Pre procedure completed. Perioperative period discussed, all questions and concerns addressed.

## 2025-01-30 NOTE — H&P
Subjective:      Patient ID: Josh Ruth is a 59 y.o. male.    Chief Complaint: kidney stones/hydronephrosis  Patient is a 59 y.o. male who is established to our clinic and was initially referred by Dr. Ruth for evaluation of 2nd opinion/hydronephrosis.     HPI  Patient has a history of kidney stones.  He previously underwent a right open ureterolithotomy with Dr. Gamboa.  He has had numerous other endoscopic evaluations including most recently a staged ureteroscopy with Dr. Ruth (7/3/24 & 8/7/24).    Mag 3 renal scan from 10/16/24 was independently reviewed today and reveals right renal obstruction, 32% right differential renal function compared to 68% left.    Patient is having regular flank and abdominal pain on the right side.  Denies any fevers or chills. Here for evaluation of possible ureteral stricture.        Review of Systems  All other systems reviewed and negative except pertinent positives noted in HPI.    Objective:     Physical Exam  Constitutional:       General: He is not in acute distress.     Appearance: He is well-developed.   HENT:      Head: Normocephalic and atraumatic.   Eyes:      General: No scleral icterus.  Neck:      Trachea: No tracheal deviation.   Pulmonary:      Effort: Pulmonary effort is normal. No respiratory distress.   Abdominal:       Neurological:      Mental Status: He is alert and oriented to person, place, and time.   Psychiatric:         Behavior: Behavior normal.         Thought Content: Thought content normal.         Judgment: Judgment normal.       Assessment:     1. Hydronephrosis      Plan:     1. Hydronephrosis        Orders Placed This Encounter   Procedures    Diet NPO     Standing Status:   Standing     Number of Occurrences:   1    Collect urine on patient arrival     Standing Status:   Standing     Number of Occurrences:   1    Place ELBERT hose     Standing Status:   Standing     Number of Occurrences:   1    Place sequential compression device      Standing Status:   Standing     Number of Occurrences:   1    Place in Outpatient     Standing Status:   Standing     Number of Occurrences:   1     Order Specific Question:   Diagnosis     Answer:   Hydronephrosis [591.ICD-9-CM]     Order Specific Question:   Is the Future Attending Known?     Answer:   Yes     Order Specific Question:   Future Attending Provider     Answer:   MATILDE SIMMS [[id number]]     Order Specific Question:   Special Needs:     Answer:   No Special Needs [1]     All benefits, risks, and alternatives were explained to the patient in great detail. All patient questions were addressed and the patient voiced clear understanding of our discussion. The patient has elected to undergo antegrade nephrostogram, retrograde pyelogram and possible cystogram.    The patient denies all recent n/v/d, fevers, chills, and illnesses.     Urine dipstick - pH 6, positive for trace protein. Negative for all remaining components.

## 2025-01-30 NOTE — TELEPHONE ENCOUNTER
----- Message from Sheeba sent at 1/30/2025  1:35 PM CST -----  Type:  Appointment Request    Caller is requesting a sooner appointment.    Name of Caller:Mr Moreno   When is the first available appointment?n/a    Would the patient rather a call back or a response via [facility]? Call   Best Call Back Number:[phone]  Additional Information: wants to change his appt to a later date

## 2025-01-30 NOTE — OP NOTE
[facility]  Operative Note    Date: 01/30/2025    Pre-Op Diagnosis: Hydronephrosis    Post-Op Diagnosis: same    Procedure(s) Performed:  1. Cystoscopy with right RPG  2. Fluoro < 1 h  3. Right antegrade nephrogram  4. Cystogram     Specimen(s): none    Staff Surgeon: Mesfin Mariee MD    Assistant Surgeon: Anthony Marie DO, Pankaj Farrell MD    Anesthesia: Monitored Local Anesthesia with Sedation    Indications: Josh Ruth is a 59 y.o. male with a history of open ureterolithotomy and hydronephrosis here for evaluation for ureteral stricture.     Findings: Antegrade nephrogram and retrograde pyelogram revealed roughly 3.5 cm narrowing of the proximal ureter just superior to the iliac crest. Cystogram performed under gravity with roughly  300-350 cc bladder.     Estimated Blood Loss: min    Drains: None    Procedure in Detail:  After risks, benefits and possible complications of the procedure were explained, the patient elected to undergo the procedure and informed consent was obtained. All questions were answered in the ladonna-operative area. The patient was transferred to the cystoscopy suite and placed in the supine position. SCDs were applied and working. Anesthesia was administered. Once the patient was adequately sedated, he was placed in the dorsal lithotomy position and a  bump was  placed under the patients left side and prepped and draped in the usual sterile fashion. Time out was performed, ladonna-procedural antibiotics were confirmed.     A rigid cystoscope in a 22 Fr sheath was introduced into the bladder per urethra. This passed easily. The entire urethra was visualized which showed no masses or strictures. The right and left ureteral orifices were identified in the normal anatomic position and were seen effluxing clear urine. Formal cystoscopy was performed which revealed no masses or lesions suspicious for malignancy, no trabeculations, no bladder stones and no bladder  diverticuli.     A 5 Fr open-ended ureteral catheter was used to perform a right RPG. Simultaneously a antegrade nephrostogram was performed by installation of contrast  through the patients nephrostomy tube. This revealed the findings above.    The bladder was drained, and a 16 Fr murillo catheter  was placed. Using a syringe tip cathter, 300-350 ccs of contrast was instilled through the murillo into the patients bladder with intermittent fluoroscopy to monitor progress.    When contrast was no longer filled the bladder under gravity, the bladder was drained, murillo removed and the patient was removed from lithotomy.     The patient tolerated the procedure well and was transferred to recovery in stable condition.    Disposition: The patient will follow up with Dr. Mariee in 1 weeks.    Anthony Marie, DO      I have reviewed the operative note performed by Dr. Marie, and I concur with her/his documentation of Josh Ruth. I was present for the critical or key portions of the procedure.

## 2025-01-30 NOTE — ANESTHESIA POSTPROCEDURE EVALUATION
Anesthesia Post Evaluation    Patient: Josh Ruth    Procedure(s) Performed: Procedure(s) (LRB):  CYSTOSCOPY (N/A)  CYSTOSCOPY, WITH RETROGRADE PYELOGRAM (Right)  Nephrostogram - antegrade (Right)  CYSTOGRAM    Final Anesthesia Type: general      Patient location during evaluation: OPS  Patient participation: Yes- Able to Participate  Level of consciousness: awake  Post-procedure vital signs: reviewed and stable  Pain management: adequate  Airway patency: patent    PONV status at discharge: No PONV  Anesthetic complications: no      Cardiovascular status: blood pressure returned to baseline and stable  Respiratory status: unassisted and spontaneous ventilation  Hydration status: euvolemic  Follow-up not needed.              Vitals Value Taken Time   /77 01/30/25 0946   Temp 36.5 °C (97.7 °F) 01/30/25 0902   Pulse 51 01/30/25 0957   Resp 18 01/30/25 1000   SpO2 100 % 01/30/25 0957   Vitals shown include unfiled device data.      No case tracking events are documented in the log.      Pain/Teri Score: Teri Score: 10 (1/30/2025  9:15 AM)

## 2025-01-30 NOTE — TRANSFER OF CARE
"Anesthesia Transfer of Care Note    Patient: Josh Ruth    Procedure(s) Performed: Procedure(s) (LRB):  CYSTOSCOPY (N/A)  CYSTOSCOPY, WITH RETROGRADE PYELOGRAM (Right)  Nephrostogram - antegrade (Right)  CYSTOGRAM    Patient location: PACU    Anesthesia Type: general    Transport from OR: Transported from OR on 6-10 L/min O2 by face mask with adequate spontaneous ventilation    Post pain: adequate analgesia    Post assessment: no apparent anesthetic complications and tolerated procedure well    Post vital signs: stable    Level of consciousness: alert and awake    Nausea/Vomiting: no nausea/vomiting    Complications: none    Transfer of care protocol was followed      Last vitals: Visit Vitals  /67 (BP Location: Right arm, Patient Position: Lying)   Pulse (!) 57   Temp 36.5 °C (97.7 °F) (Temporal)   Resp 15   Ht 5' 7" (1.702 m)   Wt 57.2 kg (126 lb)   SpO2 100%   BMI 19.73 kg/m²     "

## 2025-01-30 NOTE — BRIEF OP NOTE
[facility] - [facility] (1st Fl)  Brief Operative Note    Surgery Date: 1/30/2025     Surgeons and Role:     * Mesfin Mariee MD - Primary     * Pankaj Farrell MD - Resident - Assisting     * Anthony Marie DO - Resident - Assisting        Pre-op Diagnosis:  Ureteral stricture, right [N13.5]    Post-op Diagnosis:  Post-Op Diagnosis Codes:     * Ureteral stricture, right [N13.5]    Procedure(s) (LRB):  CYSTOSCOPY (N/A)  CYSTOSCOPY, WITH RETROGRADE PYELOGRAM (Right)  Nephrostogram - antegrade (Right)  CYSTOGRAM    Anesthesia: General/MAC    Operative Findings: Right Retrograde pyelogram and antegrade nephrogram performed. Roughly 3-4 cm stricture noted just proximal to the iliac crest on imaging. Cystogram with roughly 300-350 cc bladder.     Estimated Blood Loss: * No values recorded between 1/30/2025  8:28 AM and 1/30/2025  8:50 AM *         Specimens:   Specimen (24h ago, onward)      None              Discharge Note    OUTCOME: Patient tolerated treatment/procedure well without complication and is now ready for discharge.    DISPOSITION: Home or Self Care    FINAL DIAGNOSIS:  Hydronephrosis due to obstruction of ureter    FOLLOWUP: In clinic in 1 week to discuss findings.     DISCHARGE INSTRUCTIONS:    Discharge Procedure Orders   No dressing needed     Notify your health care provider if you experience any of the following:  temperature >100.4     Notify your health care provider if you experience any of the following:  persistent nausea and vomiting or diarrhea     Notify your health care provider if you experience any of the following:  severe uncontrolled pain     Notify your health care provider if you experience any of the following:  difficulty breathing or increased cough     Notify your health care provider if you experience any of the following:  severe persistent headache     Notify your health care provider if you experience any of the following:  worsening rash     Notify your health care provider  if you experience any of the following:  persistent dizziness, light-headedness, or visual disturbances     Activity as tolerated

## 2025-01-30 NOTE — DISCHARGE INSTRUCTIONS
Post Cystoscopy Instructions  Do not strain to have a bowel movement  No strenuous exercise x 7 days  No driving while you are on narcotic pain medications or if your murillo  catheter is in place    You can expect:  To pass stone fragments if you had a stone procedure  Have pain when you void from your stent if you have a stent in place  See blood in your urine if you have a stent in place    If you have a catheter, please return to the ER if your catheter stops draining or you are having abdominal pain.    Call the doctor if:  Temperature is greater than 101F  Persistent vomiting and inability to keep food down  Inability to void if you do not have a catheter

## 2025-01-31 ENCOUNTER — TELEPHONE (OUTPATIENT)
Dept: ENDOCRINOLOGY | Facility: CLINIC | Age: 60
End: 2025-01-31
Payer: COMMERCIAL

## 2025-01-31 NOTE — TELEPHONE ENCOUNTER
Per Dr. Shelley    Please advise patient that I reviewed his labs. His labs are relatively stable with regards to the primary hyperparathyroidism. I still need him to do the 24 hr urine collection as soon as he is able. His bone density also showed osteoporosis or thinning of the bones from the primary hyperparathyroidism. This could improve with parathyroid surgery, but he may eventually need medication for the osteoporosis. We will re evaluate his bone density 6-12 months after parathyroid surgery.

## 2025-01-31 NOTE — TELEPHONE ENCOUNTER
Please advise patient that I reviewed his labs. His labs are relatively stable with regards to the primary hyperparathyroidism. I still need him to do the 24 hr urine collection as soon as he is able. His bone density also showed osteoporosis or thinning of the bones from the primary hyperparathyroidism. This could improve with parathyroid surgery, but he may eventually need medication for the osteoporosis. We will re evaluate his bone density 6-12 months after parathyroid surgery.

## 2025-02-05 ENCOUNTER — OFFICE VISIT (OUTPATIENT)
Dept: UROLOGY | Facility: CLINIC | Age: 60
End: 2025-02-05
Payer: COMMERCIAL

## 2025-02-05 ENCOUNTER — LAB VISIT (OUTPATIENT)
Dept: LAB | Facility: HOSPITAL | Age: 60
End: 2025-02-05
Attending: INTERNAL MEDICINE
Payer: COMMERCIAL

## 2025-02-05 VITALS
WEIGHT: 126.13 LBS | SYSTOLIC BLOOD PRESSURE: 134 MMHG | BODY MASS INDEX: 19.8 KG/M2 | DIASTOLIC BLOOD PRESSURE: 93 MMHG | HEIGHT: 67 IN | HEART RATE: 73 BPM

## 2025-02-05 DIAGNOSIS — N13.5 URETERAL STRICTURE, RIGHT: Primary | ICD-10-CM

## 2025-02-05 DIAGNOSIS — E21.3 HYPERPARATHYROIDISM: ICD-10-CM

## 2025-02-05 LAB
CALCIUM 24H UR-MRATE: 4 MG/HR (ref 4–12)
CALCIUM UR-MCNC: 6.5 MG/DL (ref 0–15)
CALCIUM URINE (MG/SPEC): 98 MG/SPEC
CREAT 24H UR-MRATE: 30 MG/HR (ref 40–75)
CREAT UR-MCNC: 48 MG/DL (ref 23–375)
CREATININE, URINE (MG/SPEC): 720 MG/SPEC
URINE COLLECTION DURATION: 24 HR
URINE COLLECTION DURATION: 24 HR
URINE VOLUME: 1500 ML
URINE VOLUME: 1500 ML

## 2025-02-05 PROCEDURE — G2211 COMPLEX E/M VISIT ADD ON: HCPCS | Mod: S$GLB,,, | Performed by: UROLOGY

## 2025-02-05 PROCEDURE — 3080F DIAST BP >= 90 MM HG: CPT | Mod: CPTII,S$GLB,, | Performed by: UROLOGY

## 2025-02-05 PROCEDURE — 99215 OFFICE O/P EST HI 40 MIN: CPT | Mod: S$GLB,,, | Performed by: UROLOGY

## 2025-02-05 PROCEDURE — 3008F BODY MASS INDEX DOCD: CPT | Mod: CPTII,S$GLB,, | Performed by: UROLOGY

## 2025-02-05 PROCEDURE — 87086 URINE CULTURE/COLONY COUNT: CPT | Performed by: STUDENT IN AN ORGANIZED HEALTH CARE EDUCATION/TRAINING PROGRAM

## 2025-02-05 PROCEDURE — 82570 ASSAY OF URINE CREATININE: CPT | Performed by: INTERNAL MEDICINE

## 2025-02-05 PROCEDURE — 99999 PR PBB SHADOW E&M-EST. PATIENT-LVL III: CPT | Mod: PBBFAC,,, | Performed by: UROLOGY

## 2025-02-05 PROCEDURE — 3075F SYST BP GE 130 - 139MM HG: CPT | Mod: CPTII,S$GLB,, | Performed by: UROLOGY

## 2025-02-05 PROCEDURE — 1160F RVW MEDS BY RX/DR IN RCRD: CPT | Mod: CPTII,S$GLB,, | Performed by: UROLOGY

## 2025-02-05 PROCEDURE — 82340 ASSAY OF CALCIUM IN URINE: CPT | Performed by: INTERNAL MEDICINE

## 2025-02-05 PROCEDURE — 1159F MED LIST DOCD IN RCRD: CPT | Mod: CPTII,S$GLB,, | Performed by: UROLOGY

## 2025-02-05 NOTE — PROGRESS NOTES
Subjective:      Patient ID: Josh Ruth is a 59 y.o. male.    Chief Complaint: kidney stones/hydronephrosis  Patient is a 59 y.o. male who is established to our clinic and was initially referred by Dr. Ruth for evaluation of 2nd opinion/hydronephrosis.     HPI  Patient has a history of kidney stones.  He previously underwent a right open ureterolithotomy with Dr. Gamboa.  He has had numerous other endoscopic evaluations including most recently a staged ureteroscopy with Dr. Ruth (7/3/24 & 8/7/24).    Mag 3 renal scan from 10/16/24 was independently reviewed today and reveals right renal obstruction, 32% right differential renal function compared to 68% left.    Patient is having regular flank and abdominal pain on the right side.  Denies any fevers or chills.  Here to discuss management of presumed right ureteral stricture and confirmed right renal obstruction.    Underwent an up and downagram on 1/30/25 showing a segment of 3.5cm proximal ureter stricture on the right.    Prior smoker.     No blood thinners    Abdominal surgery: prior right ureterolithotomy    Review of Systems  All other systems reviewed and negative except pertinent positives noted in HPI.    Objective:     Physical Exam  Constitutional:       General: He is not in acute distress.     Appearance: He is well-developed.   HENT:      Head: Normocephalic and atraumatic.   Eyes:      General: No scleral icterus.  Neck:      Trachea: No tracheal deviation.   Pulmonary:      Effort: Pulmonary effort is normal. No respiratory distress.   Abdominal:       Neurological:      Mental Status: He is alert and oriented to person, place, and time.   Psychiatric:         Behavior: Behavior normal.         Thought Content: Thought content normal.         Judgment: Judgment normal.       Assessment:     No diagnosis found.    Plan:     No diagnosis found.      No orders of the defined types were placed in this encounter.    1. Kidney stone:  -General  risk factors for kidney stones and the conservative measures to prevent kidney stones in the future were discussed with the patient in detail.  The patient was encouraged to drink 2-3 liters of water a day, limit iced tea and kim as well as foods high in oxalate.  They were cautioned to try to limit salt and red meat intake.  We also discussed adding citrate to the diet with the addition of noelle or lemon juice to their water or alternatively with crystal light.   imaging reviewed as per HPI.        2. Right hydronephrosis:  -IR placed right NT 12/27/24  -Mag 3 renal scan from 10/16/24 was independently reviewed today and reveals 32% right differential renal function.      3. Hyperparathyroidism:  -seeing endocrine today. US parathyroid today  -patient will do 24 hour urine to asses urinary calcium. Taking to Dr. Shelley today  -pth normal most recently  -serum Ca high/normal.   By his report, he has a diagnosis of hyperparathyroidism and has been recommended to undergo parathyroidectomy.      4. Right ureteral stricture:  -Plan for robotic right ureteral stricture repair with buccal graft and ureteral stent placement  -Maintain right nephrostomy tube; will ask IR to exchange this prior to surgery.   -will also discuss surgical collaboration with Dr. Valle to help with buccal ureteroplasty.   -Urine for culture today      - code applied: patient requires or will require a continuous, longitudinal, and active collaborative plan of care related to this patient's health condition, kidney stones/hydronephrosis --the management of which requires the direction of a practitioner with specialized clinical knowledge, skill, and expertise.

## 2025-02-06 LAB
BACTERIA UR CULT: NORMAL
BACTERIA UR CULT: NORMAL

## 2025-02-11 ENCOUNTER — PATIENT MESSAGE (OUTPATIENT)
Dept: INTERVENTIONAL RADIOLOGY/VASCULAR | Facility: CLINIC | Age: 60
End: 2025-02-11
Payer: COMMERCIAL

## 2025-02-12 DIAGNOSIS — N13.1 HYDRONEPHROSIS DUE TO OBSTRUCTION OF URETER: ICD-10-CM

## 2025-02-12 DIAGNOSIS — N13.30 HYDRONEPHROSIS: Primary | ICD-10-CM

## 2025-02-12 DIAGNOSIS — N20.0 KIDNEY STONES: ICD-10-CM

## 2025-02-17 ENCOUNTER — PATIENT MESSAGE (OUTPATIENT)
Dept: INTERVENTIONAL RADIOLOGY/VASCULAR | Facility: HOSPITAL | Age: 60
End: 2025-02-17
Payer: COMMERCIAL

## 2025-02-18 ENCOUNTER — TELEPHONE (OUTPATIENT)
Dept: INTERVENTIONAL RADIOLOGY/VASCULAR | Facility: HOSPITAL | Age: 60
End: 2025-02-18
Payer: COMMERCIAL

## 2025-02-18 NOTE — NURSING
Pre-procedure call complete.  2 patient identifier used (name and ).   Arrival time 11:30.      No food after midnight.  You may drink clear liquids (sports drinks, clear juices) until 2 hours before arrival time.  Clear liquids include only water, black coffee (no creamer), clear oral rehydration drinks, clear sports drinks and clear fruit juices.  Clear liquids do NOT include anything with pulp or food particles (chicken broth, ice cream, yogurt, jello, etc).  NO orange juice, pulpy juices, or apple cider.  If you can read newsprint through the liquid, it qualifies as clear.  IF UNSURE, drink water instead.      Have NOTHING BY MOUTH 2 hours before arrival time.  Medication the morning of your procedure may be taken with a sip of water.    Patient aware will need someone to provide transport home and monitor pt 8 hours post procedure.  No driving for at least 24 hours after procedure.   Patient advised to take blood pressure medications with a sip of water morning of procedure.  Patient verbalized aware of which medications to take.  Do not take sleep medication (including OTC) the night before procedure.  Arrival time and location given.  Expected length of stay reviewed.  Covid screening completed.  Patient verbalized understanding of all pre-procedure instructions.  Written instructions and directions sent to patient in [employer].

## 2025-02-19 ENCOUNTER — HOSPITAL ENCOUNTER (OUTPATIENT)
Dept: INTERVENTIONAL RADIOLOGY/VASCULAR | Facility: HOSPITAL | Age: 60
Discharge: HOME | End: 2025-02-19
Payer: COMMERCIAL

## 2025-02-19 VITALS
HEIGHT: 67 IN | HEART RATE: 58 BPM | TEMPERATURE: 97 F | WEIGHT: 125 LBS | RESPIRATION RATE: 20 BRPM | DIASTOLIC BLOOD PRESSURE: 73 MMHG | BODY MASS INDEX: 19.62 KG/M2 | OXYGEN SATURATION: 98 % | SYSTOLIC BLOOD PRESSURE: 150 MMHG

## 2025-02-19 DIAGNOSIS — N13.30 HYDRONEPHROSIS, UNSPECIFIED HYDRONEPHROSIS TYPE: Primary | ICD-10-CM

## 2025-02-19 DIAGNOSIS — N13.1 HYDRONEPHROSIS DUE TO OBSTRUCTION OF URETER: ICD-10-CM

## 2025-02-19 DIAGNOSIS — N13.30 HYDRONEPHROSIS: ICD-10-CM

## 2025-02-19 DIAGNOSIS — N20.0 KIDNEY STONES: ICD-10-CM

## 2025-02-19 PROCEDURE — C1769 GUIDE WIRE: HCPCS

## 2025-02-19 PROCEDURE — C1894 INTRO/SHEATH, NON-LASER: HCPCS

## 2025-02-19 PROCEDURE — 94760 N-INVAS EAR/PLS OXIMETRY 1: CPT

## 2025-02-19 PROCEDURE — 99900035 HC TECH TIME PER 15 MIN (STAT)

## 2025-02-19 PROCEDURE — C1729 CATH, DRAINAGE: HCPCS

## 2025-02-19 PROCEDURE — 63600175 PHARM REV CODE 636 W HCPCS: Performed by: RADIOLOGY

## 2025-02-19 RX ORDER — ONDANSETRON HYDROCHLORIDE 2 MG/ML
4 INJECTION, SOLUTION INTRAVENOUS EVERY 6 HOURS PRN
Status: DISCONTINUED | OUTPATIENT
Start: 2025-02-19 | End: 2025-02-20 | Stop reason: HOSPADM

## 2025-02-19 RX ORDER — FENTANYL CITRATE 50 UG/ML
INJECTION, SOLUTION INTRAMUSCULAR; INTRAVENOUS
Status: COMPLETED | OUTPATIENT
Start: 2025-02-19 | End: 2025-02-19

## 2025-02-19 RX ORDER — CEFTRIAXONE 1 G/50ML
INJECTION, SOLUTION INTRAVENOUS
Status: COMPLETED | OUTPATIENT
Start: 2025-02-19 | End: 2025-02-19

## 2025-02-19 RX ORDER — LIDOCAINE HYDROCHLORIDE 20 MG/ML
INJECTION, SOLUTION INFILTRATION; PERINEURAL
Status: COMPLETED | OUTPATIENT
Start: 2025-02-19 | End: 2025-02-19

## 2025-02-19 RX ORDER — SODIUM CHLORIDE 9 MG/ML
INJECTION, SOLUTION INTRAVENOUS CONTINUOUS
Status: DISCONTINUED | OUTPATIENT
Start: 2025-02-19 | End: 2025-02-20 | Stop reason: HOSPADM

## 2025-02-19 RX ORDER — MIDAZOLAM HYDROCHLORIDE 1 MG/ML
INJECTION, SOLUTION INTRAMUSCULAR; INTRAVENOUS
Status: COMPLETED | OUTPATIENT
Start: 2025-02-19 | End: 2025-02-19

## 2025-02-19 RX ORDER — LIDOCAINE HYDROCHLORIDE 10 MG/ML
1 INJECTION, SOLUTION EPIDURAL; INFILTRATION; INTRACAUDAL; PERINEURAL ONCE
Status: DISCONTINUED | OUTPATIENT
Start: 2025-02-19 | End: 2025-02-20 | Stop reason: HOSPADM

## 2025-02-19 RX ADMIN — MIDAZOLAM HYDROCHLORIDE 1 MG: 1 INJECTION, SOLUTION INTRAMUSCULAR; INTRAVENOUS at 01:02

## 2025-02-19 RX ADMIN — CEFTRIAXONE 1 G: 1 INJECTION, SOLUTION INTRAVENOUS at 01:02

## 2025-02-19 RX ADMIN — FENTANYL CITRATE 50 MCG: 50 INJECTION, SOLUTION INTRAMUSCULAR; INTRAVENOUS at 01:02

## 2025-02-19 RX ADMIN — LIDOCAINE HYDROCHLORIDE 20 ML: 20 INJECTION, SOLUTION INFILTRATION; PERINEURAL at 01:02

## 2025-02-19 NOTE — H&P
Radiology History & Physical      SUBJECTIVE:     Chief Complaint: Hydronephrosis    History of Present Illness:  Josh Ruth is a 59 y.o. male who presents for right nephrostomy tube change  Past Medical History:   Diagnosis Date    Arthritis     Chronic low back pain     Hypertension     Kidney stones     Parathyroid disorder 08/2024    Renal disorder     hydronephrosis     Past Surgical History:   Procedure Laterality Date    ANTEGRADE NEPHROSTOGRAPHY Right 1/30/2025    Procedure: Nephrostogram - antegrade;  Surgeon: Mesfin Mariee MD;  [facility];  Service: Urology;  Laterality: Right;    CYSTOGRAM  1/30/2025    Procedure: CYSTOGRAM;  Surgeon: Mesfin Mariee MD;  Location: [facility];  Service: Urology;;    CYSTOSCOPIC LITHOLAPAXY  7/3/2024    Procedure: CYSTOLITHOLAPAXY;  Surgeon: Paul Ruth MD;  Location: [facility] OR;  Service: Urology;;    CYSTOSCOPY N/A 1/30/2025    Procedure: CYSTOSCOPY;  Surgeon: Mesfin Mariee MD;  [facility];  Service: Urology;  Laterality: N/A;    CYSTOSCOPY W/ LASER LITHOTRIPSY      CYSTOSCOPY W/ RETROGRADES Right 1/30/2025    Procedure: CYSTOSCOPY, WITH RETROGRADE PYELOGRAM;  Surgeon: Mesfin Mariee MD;  [facility];  Service: Urology;  Laterality: Right;    CYSTOSCOPY W/ URETERAL STENT REMOVAL Right 8/7/2024    Procedure: CYSTOSCOPY, WITH URETERAL STENT REMOVAL;  Surgeon: Paul Ruth MD;  Location: [facility] OR;  Service: Urology;  Laterality: Right;    CYSTOSCOPY WITH CALCULUS EXTRACTION Right 7/3/2024    Procedure: CYSTOSCOPY, WITH CALCULUS REMOVAL;  Surgeon: Paul Ruth MD;  Location: [facility] OR;  Service: Urology;  Laterality: Right;    CYSTOSCOPY WITH CALCULUS EXTRACTION Right 8/7/2024    Procedure: CYSTOSCOPY, WITH CALCULUS REMOVAL;  Surgeon: Paul Ruth MD;  Location: [facility] OR;  Service: Urology;  Laterality: Right;    CYSTOURETEROSCOPY WITH RETROGRADE PYELOGRAPHY AND INSERTION OF STENT INTO URETER  Right 10/26/2023    Procedure: CYSTOURETEROSCOPY, WITH RETROGRADE PYELOGRAM AND URETERAL STENT INSERTION;  Surgeon: Paul Ruth MD;  Location: [facility] OR;  Service: Urology;  Laterality: Right;    CYSTOURETEROSCOPY WITH RETROGRADE PYELOGRAPHY AND INSERTION OF STENT INTO URETER Right 7/3/2024    Procedure: CYSTOURETEROSCOPY, WITH RETROGRADE PYELOGRAM AND URETERAL STENT INSERTION;  Surgeon: Paul Ruth MD;  Location: [facility] OR;  Service: Urology;  Laterality: Right;    CYSTOURETEROSCOPY WITH RETROGRADE PYELOGRAPHY AND INSERTION OF STENT INTO URETER Right 8/7/2024    Procedure: CYSTOURETEROSCOPY, WITH RETROGRADE PYELOGRAM AND URETERAL STENT INSERTION;  Surgeon: Paul Ruth MD;  Location: [facility] OR;  Service: Urology;  Laterality: Right;    LASER LITHOTRIPSY Right 10/26/2023    Procedure: LITHOTRIPSY, USING LASER-THULIUM;  Surgeon: Paul Ruth MD;  Location: [facility] OR;  Service: Urology;  Laterality: Right;    LASER LITHOTRIPSY Right 7/3/2024    Procedure: LITHOTRIPSY, USING LASER, Thulium;  Surgeon: Paul Ruth MD;  Location: [facility] OR;  Service: Urology;  Laterality: Right;    LASER LITHOTRIPSY Right 8/7/2024    Procedure: LITHOTRIPSY, USING LASER - Thulium;  Surgeon: Paul Ruth MD;  Location: [facility] OR;  Service: Urology;  Laterality: Right;    LUMBAR FUSION  12/2019       Home Meds:   Prior to Admission medications    Medication Sig Start Date End Date Taking? Authorizing Provider   cholecalciferol, vitamin D3, 1,250 mcg (50,000 unit) capsule Take 50,000 Units by mouth every 7 days. 6/18/24  Yes Provider, Historical   gabapentin (NEURONTIN) 800 MG tablet Take 800 mg by mouth 3 (three) times daily as needed. 10/18/23  Yes Provider, Historical   oxyCODONE-acetaminophen (PERCOCET)  mg per tablet Take 1 tablet by mouth every 4 (four) hours. 11/20/24  Yes Provider, Historical   propranoloL (INDERAL) 10 MG tablet Take 10 mg by mouth 2 (two) times daily.   Yes Provider, Historical     Anticoagulants/Antiplatelets: no  anticoagulation    Allergies:   Review of patient's allergies indicates:   Allergen Reactions    Pcn [penicillins] Hives     Sedation History:  no adverse reactions    Review of Systems:   Hematological: no known coagulopathies  Respiratory: no shortness of breath  Cardiovascular: no chest pain  Gastrointestinal: no abdominal pain  Genito-Urinary: no dysuria  Musculoskeletal: negative  Neurological: no TIA or stroke symptoms         OBJECTIVE:     Vital Signs (Most Recent)  Temp: 99.1 °F (37.3 °C) (02/19/25 1227)  Pulse: 68 (02/19/25 1227)  Resp: 18 (02/19/25 1227)  BP: 121/75 (02/19/25 1227)  SpO2: 99 % (02/19/25 1227)    Physical Exam:  ASA: 3  Mallampati: 2    General: no acute distress  Mental Status: alert and oriented to person, place and time  HEENT: normocephalic, atraumatic  Chest: unlabored breathing  Heart: regular heart rate  Abdomen: nondistended  Extremity: moves all extremities    Laboratory  Lab Results   Component Value Date    INR 1.0 12/23/2024       Lab Results   Component Value Date    WBC 11.77 12/23/2024    HGB 14.6 12/23/2024    HCT 41.8 12/23/2024    MCV 89 12/23/2024     12/23/2024      Lab Results   Component Value Date     (H) 01/29/2025     01/29/2025    K 4.8 01/29/2025     01/29/2025    CO2 27 01/29/2025    BUN 13 01/29/2025    CREATININE 0.9 01/29/2025    CALCIUM 10.5 01/29/2025    ALT 18 10/28/2023    AST 28 10/28/2023    ALBUMIN 3.5 01/29/2025    BILITOT 0.7 10/28/2023       ASSESSMENT/PLAN:     Sedation Plan: Moderate  Patient will undergo right nephrostomy tube change.    Taj Wan M.D.  Interventional Radiology  Department of Radiology

## 2025-02-19 NOTE — Clinical Note
Right: Back.   Scrubbed with ChloroPrep With Tint.   Painted with None.  Hair: N/A.  Skin prep dry before draping.  Prepped by: Jasvir العراقي 2/19/2025 12:47 PM.

## 2025-02-19 NOTE — PROCEDURES
Radiology Post-Procedure Note    Pre Op Diagnosis: right hydronephrosis    Post Op Diagnosis: right hydronephrosis    Procedure:right percutaneous nephrostomy tube change    Procedure performed by: Taj Wan MD    Written Informed Consent Obtained: Yes    Specimen Removed: NO    Estimated Blood Loss: Minimal    Findings: Local anesthesia and moderate sedation were used.      The indwelling nephrostomy tube was removed over a wire and a new 10 Fr drainage catheter was placed under fluoroscopic guidance.  The drain was secured in place and dressed.      There were no complications and the patient tolerated the procedure well.  Please see Imaging report for further details.      Taj Wan M.D.  Interventional Radiology  Department of Radiology

## 2025-02-19 NOTE — DISCHARGE INSTRUCTIONS
Nephrostomy Tube Home Care     What is a nephrostomy tube?   Kidney stones, kidney infection, trauma, or other reasons can cause problems with urine leaving the body normally. When this happens, your provider may recommend a nephrostomy tube to drain urine from your kidney to a bag outside your body. A nephrostomy tube is a small, flexible tube that goes through the skin directly into the kidney. You may have one tube or two depending on if one or both kidneys are affected.     Nephrostomy tubes help drain the urine from the body in two ways:   1. PCN: An external drain allows urine to drain outside of your body into a collection bag.   2. PCNU: Allows urine to drain into the bladder as well as outside your body into a collection bag.     Follow Up  You should be following up with your referring provider/primary care provider while the tube is in place. The tube will stay in for at least 4 weeks, unless surgery is planned to correct your kidney problem. Generally, the tube(s) are replaced every 8-12 weeks. If you do not have an appointment and feel it is time for the tube to be replaced, please call [phone].     Will my diet or daily activity be limited by my nephrostomy tube?    Continue with your regular diet and prescribed medications, unless otherwise specified on your discharge instructions. For general questions about your diet and medication, contact your primary care physician.    Avoid any activity that may result in pulling on the drain.    Do not submerge the drain. This means do not swim, sit in a hot tub, soak in a tub bath, or do anything that involves putting the drain under water.     How do I take care of my nephrostomy tube?   Empty the collection bag into the toilet when it is half full, or at least once each day. Your provider will let you know if you should keep track of the amount.   1. Turn the knob at the bottom of the bag and drain into toilet. If your provider has told you to track  the contents, empty the bag into a measuring container and record the amount.   2. Wash your hands.   3. Change outside tube and bag weekly. These can be bought from any medical supplies store.     Can I shower?   While it is okay to shower with your tube(s), do not soak in any water with your tube(s). Soaking may lead to infection.    First 2 weeks: Before taking a shower, cover the dressing with a double layer of plastic wrap (like Saran wrap) where it enters the skin. Tape down the edges. After the shower, remove the plastic and apply a clean bandage.    After 2 weeks: You may shower without the plastic wrap. Rinse well. Pat the area dry and apply a clean bandage.     Do I need to change the bandage?   Change your dressing at least every 2 days, or if it becomes dirty or wet.   1. Wash your hands.   2. Remove the old bandage carefully. Try not to pull on the drain or stitches.   3. If the skin needs to be cleaned, use a gauze or cotton swab to gently clean it with soap and water.   4. Wait for your skin to dry.   5. Apply a sterile 4x4 gauze over the tube where it enters your body. Be careful not to kink the drain.   6. Secure with paper tape.   7. Wash your hands.     Call your provider immediately or seek emergency medical attention if you experience any of the following symptoms:    Fever/chills    New or worsening belly pain    Nausea or vomiting    New or worsening redness or swelling where the drain meets the skin    Pus leaking around the drain    Abrupt change in amount of urine in the bag or when you urinate    The drain begins to leak, breaks, or falls out     Troubleshooting   If the tube falls out   1. Cover the hole in the skin with gauze pads and tape.   2. Call the Interventional Radiology Clinic for instructions on how to get the tube replaced.    [phone], choose prompt 3,  Monday - Friday, 8:00 am - 4:00 pm    [phone] After hours and on holidays. Ask to speak with the  interventional radiologist on call.       Flushing Drains   If you are told to flush drains, you will need to flush each of your urinary drains with 10 cc of saline each day.   1. Gather supplies: 10 mL of sterile normal saline solution, syringe, sterile gauze, paper tape, and rubbing alcohol (liquid or single-use alcohol wipes).   2. Wash your hands.   3. Unscrew the drainage bag from drain (place a towel beneath to catch any drips).   4. Wipe drain with alcohol.   5. Fill the syringe with 10 mL of normal saline solution.   6. Attach syringe to drain.   7. Gently inject normal saline into drain. Do NOT pull back on the syringe.   8. Unscrew syringe.   9. Reattach drainage bag.   10. Wash your hands and dispose of supplies.     Capping Drains  If you are told to cap your drain You should cap the drain 24 hours after the procedure. To cap drain follow these instructions:  Turn stop cock to off position.  Unscrew bag from drain, while leaving stop cock in position.  Keep drainage bag in case it is needed in the future.    If you experience any of the following symptoms, try uncapping the drain and attaching the drainage bag. If this does not improve your symptoms, call your provider immediately or seek emergency medical attention.    New or worsening yellowing of your skin/eyes    Fever    New or worsening belly pain

## 2025-02-19 NOTE — DISCHARGE SUMMARY
Radiology Discharge Summary      Hospital Course: No complications    Admit Date: 2/19/2025  Discharge Date: 02/19/2025     Instructions Given to Patient: Yes  Diet: Resume prior diet  Activity: activity as tolerated and no driving for today    Description of Condition on Discharge: Stable  Vital Signs (Most Recent): Temp: 99.1 °F (37.3 °C) (02/19/25 1227)  Pulse: 67 (02/19/25 1247)  Resp: 16 (02/19/25 1247)  BP: 121/75 (02/19/25 1227)  SpO2: 98 % (02/19/25 1247)    Discharge Disposition: Home    Discharge Diagnosis: Hydronephrosis s/p right nephrostomy tube change    Follow up: As scheduled    Taj Wan M.D.  Interventional Radiology  Department of Radiology

## 2025-02-20 ENCOUNTER — HOSPITAL ENCOUNTER (OUTPATIENT)
Dept: ENDOCRINOLOGY | Facility: CLINIC | Age: 60
Discharge: HOME | End: 2025-02-20
Attending: INTERNAL MEDICINE
Payer: COMMERCIAL

## 2025-02-20 ENCOUNTER — PATIENT MESSAGE (OUTPATIENT)
Dept: ENDOCRINOLOGY | Facility: CLINIC | Age: 60
End: 2025-02-20
Payer: COMMERCIAL

## 2025-02-20 DIAGNOSIS — E04.2 MULTIPLE THYROID NODULES: Primary | ICD-10-CM

## 2025-02-20 DIAGNOSIS — E21.0 PRIMARY HYPERPARATHYROIDISM: ICD-10-CM

## 2025-02-20 DIAGNOSIS — E21.3 HYPERPARATHYROIDISM: ICD-10-CM

## 2025-02-21 ENCOUNTER — TELEPHONE (OUTPATIENT)
Dept: ENDOCRINOLOGY | Facility: CLINIC | Age: 60
End: 2025-02-21
Payer: COMMERCIAL

## 2025-03-02 PROBLEM — N13.5 URETERAL STRICTURE, RIGHT: Status: ACTIVE | Noted: 2025-03-02

## 2025-03-02 NOTE — ASSESSMENT & PLAN NOTE
Labs:   Phos: low (2.2) 1/29/25  PTH: normal  Ca+: normal  Vitamin D: normal  History of renal stones  Managed by: Endocrinology  Plan is for surgery  Scheduled for biopsy of thyroid on 3/10/25

## 2025-03-02 NOTE — PROGRESS NOTES
[facility]  Progress Note    Patient Name: Josh Ruth  MRN: [id number]  Date of Evaluation- 03/05/2025  PCP- Rolando Rangel MD    Future cases for Josh Ruth [[id number]]       Case ID Status Date Time Jorge Procedure Provider Location    [id number] [facility] 3/13/2025 11:30  DV5 ROBOTIC URETEROURETEROSTOMY Mesfin Mariee MD [[id number]] [facility] OR 2ND FLR            HPI:  This is a 59 y.o. male  who presents today for a preoperative evaluation in preparation for robotic ureteroureterostomy.   Surgery is indicated for  right ureteral stricture .   Patient is new to me.  The history has been obtained by speaking with the patient and reviewing the electronic medical record and/or outside health information. Significant health conditions for the perioperative period are discussed below in assessment and plan.   Patient reports current health status to be fine.  Denies any new symptoms before surgery.       Subjective/ Objective:     Chief Complaint: Preoperative evaulation, perioperative medical management, and complication reduction plan.     Functional Capacity: walked to POC without CP/SOB.       Anesthesia issues: None    Difficulty mouth opening: No    Steroid use in the last 12 months:  No    Dental Issues: upper partial    Family anesthesia difficulty: None       Family Hx of Thrombosis: None    Past Medical History:   Diagnosis Date    Arthritis     Chronic low back pain     Hypertension     Kidney stones     Parathyroid disorder 08/2024    Renal disorder     hydronephrosis         Past Medical History Pertinent Negatives:   Diagnosis Date Noted    Anxiety 03/03/2025    Asthma 03/03/2025    COPD (chronic obstructive pulmonary disease) 03/03/2025    Coronary artery disease 03/03/2025    Deep vein thrombosis 03/03/2025    Depression 03/03/2025    Diabetes mellitus, type 2 03/03/2025    GERD (gastroesophageal reflux disease) 03/03/2025    Hyperlipidemia 03/03/2025    Myocardial  infarction 03/03/2025    Pulmonary embolism 03/03/2025    Seizures 03/03/2025    Stroke 03/03/2025         Past Surgical History:   Procedure Laterality Date    ANTEGRADE NEPHROSTOGRAPHY Right 1/30/2025    Procedure: Nephrostogram - antegrade;  Surgeon: Mesfin Mariee MD;  Location: [facility] OR 1ST FLR;  Service: Urology;  Laterality: Right;    CYSTOGRAM  1/30/2025    Procedure: CYSTOGRAM;  Surgeon: Mesfin Mariee MD;  Location: [facility] OR 1ST FLR;  Service: Urology;;    CYSTOSCOPIC LITHOLAPAXY  7/3/2024    Procedure: CYSTOLITHOLAPAXY;  Surgeon: Paul Ruth MD;  Location: [facility] OR;  Service: Urology;;    CYSTOSCOPY N/A 1/30/2025    Procedure: CYSTOSCOPY;  Surgeon: Mesfin Mariee MD;  Location: [facility] OR 1ST FLR;  Service: Urology;  Laterality: N/A;    CYSTOSCOPY W/ LASER LITHOTRIPSY      CYSTOSCOPY W/ RETROGRADES Right 1/30/2025    Procedure: CYSTOSCOPY, WITH RETROGRADE PYELOGRAM;  Surgeon: Mesfin Mariee MD;  Location: [facility] OR 1ST FLR;  Service: Urology;  Laterality: Right;    CYSTOSCOPY W/ URETERAL STENT REMOVAL Right 8/7/2024    Procedure: CYSTOSCOPY, WITH URETERAL STENT REMOVAL;  Surgeon: Paul Ruth MD;  Location: [facility] OR;  Service: Urology;  Laterality: Right;    CYSTOSCOPY WITH CALCULUS EXTRACTION Right 7/3/2024    Procedure: CYSTOSCOPY, WITH CALCULUS REMOVAL;  Surgeon: Paul Ruth MD;  Location: [facility] OR;  Service: Urology;  Laterality: Right;    CYSTOSCOPY WITH CALCULUS EXTRACTION Right 8/7/2024    Procedure: CYSTOSCOPY, WITH CALCULUS REMOVAL;  Surgeon: Paul Ruth MD;  Location: [facility] OR;  Service: Urology;  Laterality: Right;    CYSTOURETEROSCOPY WITH RETROGRADE PYELOGRAPHY AND INSERTION OF STENT INTO URETER Right 10/26/2023    Procedure: CYSTOURETEROSCOPY, WITH RETROGRADE PYELOGRAM AND URETERAL STENT INSERTION;  Surgeon: Paul Ruth MD;  Location: [facility] OR;  Service: Urology;  Laterality: Right;    CYSTOURETEROSCOPY WITH RETROGRADE PYELOGRAPHY AND INSERTION OF STENT INTO URETER Right  7/3/2024    Procedure: CYSTOURETEROSCOPY, WITH RETROGRADE PYELOGRAM AND URETERAL STENT INSERTION;  Surgeon: Paul Ruth MD;  Location: [facility] OR;  Service: Urology;  Laterality: Right;    CYSTOURETEROSCOPY WITH RETROGRADE PYELOGRAPHY AND INSERTION OF STENT INTO URETER Right 8/7/2024    Procedure: CYSTOURETEROSCOPY, WITH RETROGRADE PYELOGRAM AND URETERAL STENT INSERTION;  Surgeon: Paul Ruth MD;  Location: [facility] OR;  Service: Urology;  Laterality: Right;    LASER LITHOTRIPSY Right 10/26/2023    Procedure: LITHOTRIPSY, USING LASER-THULIUM;  Surgeon: Paul Ruth MD;  Location: [facility] OR;  Service: Urology;  Laterality: Right;    LASER LITHOTRIPSY Right 7/3/2024    Procedure: LITHOTRIPSY, USING LASER, Thulium;  Surgeon: Paul Ruth MD;  Location: [facility] OR;  Service: Urology;  Laterality: Right;    LASER LITHOTRIPSY Right 8/7/2024    Procedure: LITHOTRIPSY, USING LASER - Thulium;  Surgeon: Paul Ruth MD;  Location: [facility] OR;  Service: Urology;  Laterality: Right;    LUMBAR FUSION  12/2019       Review of Systems   Constitutional:  Negative for chills, fatigue, fever and unexpected weight change.   HENT:  Negative for congestion, hearing loss, rhinorrhea, sore throat, tinnitus and trouble swallowing.    Eyes:  Negative for visual disturbance.   Respiratory:  Negative for cough, chest tightness, shortness of breath and wheezing.    Cardiovascular:  Negative for chest pain, palpitations and leg swelling.   Gastrointestinal:  Negative for constipation and diarrhea.        Denies Fatty liver, Hepatitis   Genitourinary:  Negative for decreased urine volume, difficulty urinating, dysuria, frequency, hematuria and urgency.   Musculoskeletal:  Positive for back pain. Negative for arthralgias, neck pain and neck stiffness.   Neurological:  Positive for numbness (LLE). Negative for dizziness, syncope, weakness and headaches.   Hematological:  Does not bruise/bleed easily.   Psychiatric/Behavioral:  Negative for sleep  "disturbance and suicidal ideas.               VITALS  Visit Vitals  /75 (BP Location: Right arm)   Pulse 74   Temp 98.1 °F (36.7 °C) (Oral)   Ht 5' 7" (1.702 m)   Wt 58.5 kg (128 lb 15.5 oz)   SpO2 99%   BMI 20.20 kg/m²          Physical Exam  Vitals reviewed.   Constitutional:       General: He is not in acute distress.     Appearance: He is well-developed.   HENT:      Head: Normocephalic.      Nose: Nose normal.      Mouth/Throat:      Pharynx: No oropharyngeal exudate.   Eyes:      General:         Right eye: No discharge.         Left eye: No discharge.      Conjunctiva/sclera: Conjunctivae normal.      Pupils: Pupils are equal, round, and reactive to light.   Neck:      Thyroid: No thyromegaly.      Vascular: No carotid bruit or JVD.      Trachea: No tracheal deviation.   Cardiovascular:      Rate and Rhythm: Normal rate and regular rhythm. Occasional Extrasystoles are present.     Pulses:           Carotid pulses are 2+ on the right side and 2+ on the left side.       Dorsalis pedis pulses are 2+ on the right side and 2+ on the left side.        Posterior tibial pulses are 2+ on the right side and 2+ on the left side.      Heart sounds: Normal heart sounds. No murmur heard.  Pulmonary:      Effort: Pulmonary effort is normal. No respiratory distress.      Breath sounds: Normal breath sounds. No stridor. No wheezing, rhonchi or rales.   Abdominal:      General: Bowel sounds are normal. There is no distension.      Palpations: Abdomen is soft.      Tenderness: There is no abdominal tenderness. There is no guarding.   Musculoskeletal:      Cervical back: Normal range of motion. Pain with movement present.        Back:       Right lower leg: No edema.      Left lower leg: No edema.   Lymphadenopathy:      Cervical: No cervical adenopathy.   Skin:     General: Skin is warm and dry.      Capillary Refill: Capillary refill takes less than 2 seconds.      Findings: No erythema or rash.   Neurological:      " Mental Status: He is alert and oriented to person, place, and time.   Psychiatric:         Behavior: Behavior normal. Behavior is cooperative.        Significant Labs:  Lab Results   Component Value Date    WBC 11.77 12/23/2024    HGB 14.6 12/23/2024    HCT 41.8 12/23/2024     12/23/2024    ALT 18 03/03/2025    AST 27 03/03/2025     03/03/2025    K 3.8 03/03/2025     03/03/2025    CREATININE 0.7 03/03/2025    BUN 12 03/03/2025    CO2 28 03/03/2025    TSH 0.187 (L) 03/03/2025    INR 1.0 12/23/2024           EKG:   Results for orders placed or performed during the hospital encounter of 03/03/25   EKG 12-lead    Collection Time: 03/03/25  2:23 PM   Result Value Ref Range    QRS Duration 88 ms    OHS QTC Calculation 391 ms    Narrative    Test Reason : Z01.818,    Vent. Rate :  71 BPM     Atrial Rate :  71 BPM     P-R Int : 146 ms          QRS Dur :  88 ms      QT Int : 360 ms       P-R-T Axes :  74  81  67 degrees    QTcB Int : 391 ms    Normal sinus rhythm  Normal ECG  No previous ECGs available  Confirmed by Rupert Pappas ([id number]) on 3/3/2025 4:25:22 PM    Referred By: TAL EMANUEL           Confirmed By: Rupert Pappas         Imaging   US thyroid 2/20/25  Impression:     1.)  Thyroid gland is normal in size with homogeneous echotexture and normal vascularity     2.) 0.7 x 0.4 x 0.5 cm spongiform nodule is seen in the right mid lobe     3.) 0.3 x 0.2 x 0.3 cm solid, hypoechoic nodule is seen in the right mid lobe     4.) 0.8 x 0.4 x 0.6 cm solid, hypoechoic nodule is seen in the right inferior pole     5.) 1.1 x 0.5 x 1.1 cm simple cyst is seen in the left mid lobe     6.) 0.5 x 0.5 x 0.6 cm cystic nodule is seen in the left mid lobe     7.) 1.6 x 0.9 x 1.3 cm heterogeneous, predominately isoechoic nodule is seen in the left inferior pole     8.) 0.6 x 0.3 x 0.5 cm solid, hypoechoic nodule is seen in the isthmus     9.) 0.6 x 0.3 x 0.5 cm solid, hypoechoic nodule is seen in the isthmus     10.)  A  distinct parathyroid adenoma is not seen on today's exam.     RECOMMENDATIONS:  Recommend FNA of left inferior 1.6 cm nodule.  Recommend correlation with 4D parathyroid protocol CT.        Electronically signed by: Chai Shelley MD, ECNU  Date:                                            02/20/2025  Time:                                           12:14          Active Cardiac Conditions: None      Revised Cardiac Risk Index   High -Risk Surgery  Intraperitoneal; Intrathoracic; suprainguinal vascular Yes- + 1 No- 0- robotic   History of Ischemic Heart Disease   (Hx of MI/positive exercise test/current chest pain due to ischemia/use of nitrate therapy/EKG with pathological Q waves) Yes- + 1 No- 0   History of CHF  (Pulmonary edema/bilateral rales or S3 gallop/PND/CXR showing pulmonary vascular redistribution) Yes- + 1 No- 0   History of CVA   (Prior stroke or TIA) Yes- + 1 No- 0   Pre-operative treatment with insulin Yes- + 1 No- 0   Pre-operative creatinine > 2mg/dl Yes- + 1 No- 0   Total: 0      Risk Status:  Estimated risk of cardiac complications after non-cardiac surgery using the Revised Cardiac Risk Index for Preoperative risk is 3.9 %      ARISCAT (Canet) risk index: Intermediate: 13.3% risk of post-op pulmonary complications.    American Society of Anesthesiologists Physical Status classification (ASA): 2                            Assessment/Plan:     Ureteral stricture, right  Scheduled with Dr. Mariee on 3/13/25 for robotic ureteroureterostomy.    Hyperparathyroidism  Labs:   Phos: low (2.2) 1/29/25  PTH: normal  Ca+: normal  Vitamin D: normal  History of renal stones  Managed by: Endocrinology  Plan is for surgery  Scheduled for biopsy of thyroid on 3/10/25    Chronic lower back pain  Current pain   No loss of bladder or bowel control    Denies N/T to buttocks, perineum and inner surfaces of the thighs (saddle anesthesia)    Followed per outside Pain Management- Dr. Ugarte in [place]  Taking  Percocet/gabapentin  S/P Lumbar surgery 2019   Reports numbness to left leg  No imaging available          Chronic, continuous use of opioids  Taking Oxycodone-acetaminophen for chronic lower back pain.   Patient may have opioid tolerance due to chronic continuous opioid use. I recommend monitoring for opioid tolerance during the postoperative period and plan pain control that will assist the patient in the hospital as well as through the discharge process.         Discussion/Management of Perioperative Care    Thromboembolic prophylaxis (VTE) Care: Risk factors for thrombosis include: surgical procedure.  I recommend prophylaxis of thromboembolism with the use of compression stockings/pneumatic devices, and/or pharmacologic agents. The benefits should outweigh the risks for pharmacologic prophylaxis in the perioperative period. I also encourage early ambulation if not contraindicated during the post-operative period.    Risk factors for post-operative pulmonary complications include:surgery > 3 hours. To reduce the risk of pulmonary complications, prophylactic recommendations include: incentive spirometry use/deep breathing, end tidal carbon dioxide monitoring, and pain control.    To reduce the risk of postoperative renal complications, I recommend the patient maintain adequate hydration.  Avoid/reduce NSAIDS and OLVERA-2 inhibitors use as well as IV contrast for renal protection.    I recommend the use of appropriate prophylactic antibiotics to reduce the risk of surgical site infections.    Delirium risk factors include opioid use. I recommend to avoid/reduce use of benzodiazepine use (not for patients who take on a regular basis), anticholinergics, Benadryl,  and agents that may cause postoperative serotonin syndrome.  Controlled pain can decrease the risk for postop delirium and since opioids are used for postoperative pain control, I suggest using the lowest dose for the shortest amount of time necessary for  pain management.     The patient is at an increased risk for urinary retention due to : urological procedure. I recommend to avoid/decrease the use of benzodiazepines, anticholinergics, and Benadryl in the perioperative period. I also recommend using opioids for the shortest period of time if possible.          This visit was focused on Preoperative evaluation, Perioperative Medical management, complication reduction plans. I suggest that the patient follows up with primary care or relevant sub specialists for ongoing health care.    I appreciate the opportunity to be involved in this patients care. Please feel free to contact me if there were any questions about this consultation.      I spent a total of 45 minutes on the day of the visit.  This includes face to face time and non-face to face time preparing to see the patient (e.g., review of tests), obtaining and/or reviewing separately obtained history, documenting clinical information in the electronic or other health record, independently interpreting results and communicating results to the patient/family/caregiver, or care coordinator.       Patient is optimized for surgery.       Alexander Mccormick NP  Perioperative Medicine  [facility]

## 2025-03-02 NOTE — HPI
This is a 59 y.o. male  who presents today for a preoperative evaluation in preparation for robotic ureteroureterostomy.   Surgery is indicated for  right ureteral stricture .   Patient is new to me.  The history has been obtained by speaking with the patient and reviewing the electronic medical record and/or outside health information. Significant health conditions for the perioperative period are discussed below in assessment and plan.   Patient reports current health status to be fine.  Denies any new symptoms before surgery.

## 2025-03-02 NOTE — OUTPATIENT SUBJECTIVE & OBJECTIVE
Outpatient Subjective & Objective      Chief Complaint: Preoperative evaulation, perioperative medical management, and complication reduction plan.     Functional Capacity: walked to POC without CP/SOB.       Anesthesia issues: None    Difficulty mouth opening: No    Steroid use in the last 12 months:  No    Dental Issues: upper partial    Family anesthesia difficulty: None       Family Hx of Thrombosis: None    Past Medical History:   Diagnosis Date    Arthritis     Chronic low back pain     Hypertension     Kidney stones     Parathyroid disorder 08/2024    Renal disorder     hydronephrosis         Past Medical History Pertinent Negatives:   Diagnosis Date Noted    Anxiety 03/03/2025    Asthma 03/03/2025    COPD (chronic obstructive pulmonary disease) 03/03/2025    Coronary artery disease 03/03/2025    Deep vein thrombosis 03/03/2025    Depression 03/03/2025    Diabetes mellitus, type 2 03/03/2025    GERD (gastroesophageal reflux disease) 03/03/2025    Hyperlipidemia 03/03/2025    Myocardial infarction 03/03/2025    Pulmonary embolism 03/03/2025    Seizures 03/03/2025    Stroke 03/03/2025         Past Surgical History:   Procedure Laterality Date    ANTEGRADE NEPHROSTOGRAPHY Right 1/30/2025    Procedure: Nephrostogram - antegrade;  Surgeon: Mesfin Mariee MD;  Location: [facility] OR [address] [place] [address];  Service: Urology;  Laterality: Right;    CYSTOGRAM  1/30/2025    Procedure: CYSTOGRAM;  Surgeon: Mesfin Mariee MD;  Location: [facility] OR [address] [place] [address];  Service: Urology;;    CYSTOSCOPIC LITHOLAPAXY  7/3/2024    Procedure: CYSTOLITHOLAPAXY;  Surgeon: Paul Ruth MD;  Location: [facility] OR;  Service: Urology;;    CYSTOSCOPY N/A 1/30/2025    Procedure: CYSTOSCOPY;  Surgeon: Mesfin Mariee MD;  Location: [facility] OR [address] [place] [address];  Service: Urology;  Laterality: N/A;    CYSTOSCOPY W/ LASER LITHOTRIPSY      CYSTOSCOPY W/ RETROGRADES Right 1/30/2025    Procedure: CYSTOSCOPY, WITH RETROGRADE PYELOGRAM;  Surgeon: Mesfin Mariee MD;   Location: [facility] OR 1ST FLR;  Service: Urology;  Laterality: Right;    CYSTOSCOPY W/ URETERAL STENT REMOVAL Right 8/7/2024    Procedure: CYSTOSCOPY, WITH URETERAL STENT REMOVAL;  Surgeon: Paul Ruth MD;  Location: [facility] OR;  Service: Urology;  Laterality: Right;    CYSTOSCOPY WITH CALCULUS EXTRACTION Right 7/3/2024    Procedure: CYSTOSCOPY, WITH CALCULUS REMOVAL;  Surgeon: Paul Ruth MD;  Location: [facility] OR;  Service: Urology;  Laterality: Right;    CYSTOSCOPY WITH CALCULUS EXTRACTION Right 8/7/2024    Procedure: CYSTOSCOPY, WITH CALCULUS REMOVAL;  Surgeon: Paul Ruth MD;  Location: [facility] OR;  Service: Urology;  Laterality: Right;    CYSTOURETEROSCOPY WITH RETROGRADE PYELOGRAPHY AND INSERTION OF STENT INTO URETER Right 10/26/2023    Procedure: CYSTOURETEROSCOPY, WITH RETROGRADE PYELOGRAM AND URETERAL STENT INSERTION;  Surgeon: Paul Ruth MD;  Location: [facility] OR;  Service: Urology;  Laterality: Right;    CYSTOURETEROSCOPY WITH RETROGRADE PYELOGRAPHY AND INSERTION OF STENT INTO URETER Right 7/3/2024    Procedure: CYSTOURETEROSCOPY, WITH RETROGRADE PYELOGRAM AND URETERAL STENT INSERTION;  Surgeon: Paul Ruth MD;  Location: [facility] OR;  Service: Urology;  Laterality: Right;    CYSTOURETEROSCOPY WITH RETROGRADE PYELOGRAPHY AND INSERTION OF STENT INTO URETER Right 8/7/2024    Procedure: CYSTOURETEROSCOPY, WITH RETROGRADE PYELOGRAM AND URETERAL STENT INSERTION;  Surgeon: Paul Ruth MD;  Location: [facility] OR;  Service: Urology;  Laterality: Right;    LASER LITHOTRIPSY Right 10/26/2023    Procedure: LITHOTRIPSY, USING LASER-THULIUM;  Surgeon: Paul Ruth MD;  Location: [facility] OR;  Service: Urology;  Laterality: Right;    LASER LITHOTRIPSY Right 7/3/2024    Procedure: LITHOTRIPSY, USING LASER, Thulium;  Surgeon: Paul Ruth MD;  Location: [facility] OR;  Service: Urology;  Laterality: Right;    LASER LITHOTRIPSY Right 8/7/2024    Procedure: LITHOTRIPSY, USING LASER - Thulium;  Surgeon: Paul Ruth MD;  Location: [facility] OR;   "Service: Urology;  Laterality: Right;    LUMBAR FUSION  12/2019       Review of Systems   Constitutional:  Negative for chills, fatigue, fever and unexpected weight change.   HENT:  Negative for congestion, hearing loss, rhinorrhea, sore throat, tinnitus and trouble swallowing.    Eyes:  Negative for visual disturbance.   Respiratory:  Negative for cough, chest tightness, shortness of breath and wheezing.    Cardiovascular:  Negative for chest pain, palpitations and leg swelling.   Gastrointestinal:  Negative for constipation and diarrhea.        Denies Fatty liver, Hepatitis   Genitourinary:  Negative for decreased urine volume, difficulty urinating, dysuria, frequency, hematuria and urgency.   Musculoskeletal:  Positive for back pain. Negative for arthralgias, neck pain and neck stiffness.   Neurological:  Positive for numbness (LLE). Negative for dizziness, syncope, weakness and headaches.   Hematological:  Does not bruise/bleed easily.   Psychiatric/Behavioral:  Negative for sleep disturbance and suicidal ideas.               VITALS  Visit Vitals  /75 (BP Location: Right arm)   Pulse 74   Temp 98.1 °F (36.7 °C) (Oral)   Ht 5' 7" (1.702 m)   Wt 58.5 kg (128 lb 15.5 oz)   SpO2 99%   BMI 20.20 kg/m²          Physical Exam  Vitals reviewed.   Constitutional:       General: He is not in acute distress.     Appearance: He is well-developed.   HENT:      Head: Normocephalic.      Nose: Nose normal.      Mouth/Throat:      Pharynx: No oropharyngeal exudate.   Eyes:      General:         Right eye: No discharge.         Left eye: No discharge.      Conjunctiva/sclera: Conjunctivae normal.      Pupils: Pupils are equal, round, and reactive to light.   Neck:      Thyroid: No thyromegaly.      Vascular: No carotid bruit or JVD.      Trachea: No tracheal deviation.   Cardiovascular:      Rate and Rhythm: Normal rate and regular rhythm. Occasional Extrasystoles are present.     Pulses:           Carotid pulses are 2+ on " the right side and 2+ on the left side.       Dorsalis pedis pulses are 2+ on the right side and 2+ on the left side.        Posterior tibial pulses are 2+ on the right side and 2+ on the left side.      Heart sounds: Normal heart sounds. No murmur heard.  Pulmonary:      Effort: Pulmonary effort is normal. No respiratory distress.      Breath sounds: Normal breath sounds. No stridor. No wheezing, rhonchi or rales.   Abdominal:      General: Bowel sounds are normal. There is no distension.      Palpations: Abdomen is soft.      Tenderness: There is no abdominal tenderness. There is no guarding.   Musculoskeletal:      Cervical back: Normal range of motion. Pain with movement present.        Back:       Right lower leg: No edema.      Left lower leg: No edema.   Lymphadenopathy:      Cervical: No cervical adenopathy.   Skin:     General: Skin is warm and dry.      Capillary Refill: Capillary refill takes less than 2 seconds.      Findings: No erythema or rash.   Neurological:      Mental Status: He is alert and oriented to person, place, and time.   Psychiatric:         Behavior: Behavior normal. Behavior is cooperative.        Significant Labs:  Lab Results   Component Value Date    WBC 11.77 12/23/2024    HGB 14.6 12/23/2024    HCT 41.8 12/23/2024     12/23/2024    ALT 18 03/03/2025    AST 27 03/03/2025     03/03/2025    K 3.8 03/03/2025     03/03/2025    CREATININE 0.7 03/03/2025    BUN 12 03/03/2025    CO2 28 03/03/2025    TSH 0.187 (L) 03/03/2025    INR 1.0 12/23/2024           EKG:   Results for orders placed or performed during the hospital encounter of 03/03/25   EKG 12-lead    Collection Time: 03/03/25  2:23 PM   Result Value Ref Range    QRS Duration 88 ms    [facility] QTC Calculation 391 ms    Narrative    Test Reason : Z01.818,    Vent. Rate :  71 BPM     Atrial Rate :  71 BPM     P-R Int : 146 ms          QRS Dur :  88 ms      QT Int : 360 ms       P-R-T Axes :  74  81  67 degrees    QTcB  Int : 391 ms    Normal sinus rhythm  Normal ECG  No previous ECGs available  Confirmed by Rupert Pappas ([id number]) on 3/3/2025 4:25:22 PM    Referred By: TAL EMANUEL           Confirmed By: Rupert Pappas         Imaging   US thyroid 2/20/25  Impression:     1.)  Thyroid gland is normal in size with homogeneous echotexture and normal vascularity     2.) 0.7 x 0.4 x 0.5 cm spongiform nodule is seen in the right mid lobe     3.) 0.3 x 0.2 x 0.3 cm solid, hypoechoic nodule is seen in the right mid lobe     4.) 0.8 x 0.4 x 0.6 cm solid, hypoechoic nodule is seen in the right inferior pole     5.) 1.1 x 0.5 x 1.1 cm simple cyst is seen in the left mid lobe     6.) 0.5 x 0.5 x 0.6 cm cystic nodule is seen in the left mid lobe     7.) 1.6 x 0.9 x 1.3 cm heterogeneous, predominately isoechoic nodule is seen in the left inferior pole     8.) 0.6 x 0.3 x 0.5 cm solid, hypoechoic nodule is seen in the isthmus     9.) 0.6 x 0.3 x 0.5 cm solid, hypoechoic nodule is seen in the isthmus     10.)  A distinct parathyroid adenoma is not seen on today's exam.     RECOMMENDATIONS:  Recommend FNA of left inferior 1.6 cm nodule.  Recommend correlation with 4D parathyroid protocol CT.        Electronically signed by: Chai Shelley MD, ECNU  Date:                                            02/20/2025  Time:                                           12:14          Active Cardiac Conditions: None      Revised Cardiac Risk Index   High -Risk Surgery  Intraperitoneal; Intrathoracic; suprainguinal vascular Yes- + 1 No- 0- robotic   History of Ischemic Heart Disease   (Hx of MI/positive exercise test/current chest pain due to ischemia/use of nitrate therapy/EKG with pathological Q waves) Yes- + 1 No- 0   History of CHF  (Pulmonary edema/bilateral rales or S3 gallop/PND/CXR showing pulmonary vascular redistribution) Yes- + 1 No- 0   History of CVA   (Prior stroke or TIA) Yes- + 1 No- 0   Pre-operative treatment with insulin Yes- + 1 No- 0   Pre-operative  creatinine > 2mg/dl Yes- + 1 No- 0   Total: 0      Risk Status:  Estimated risk of cardiac complications after non-cardiac surgery using the Revised Cardiac Risk Index for Preoperative risk is 3.9 %      NELSONSCAT (Coco) risk index: Intermediate: 13.3% risk of post-op pulmonary complications.    American Society of Anesthesiologists Physical Status classification (ASA): 2             Outpatient Subjective & Objective

## 2025-03-03 ENCOUNTER — OFFICE VISIT (OUTPATIENT)
Dept: UROLOGY | Facility: CLINIC | Age: 60
End: 2025-03-03
Payer: COMMERCIAL

## 2025-03-03 ENCOUNTER — LAB VISIT (OUTPATIENT)
Dept: LAB | Facility: HOSPITAL | Age: 60
End: 2025-03-03
Attending: ANESTHESIOLOGY
Payer: COMMERCIAL

## 2025-03-03 ENCOUNTER — OFFICE VISIT (OUTPATIENT)
Dept: INTERNAL MEDICINE | Facility: CLINIC | Age: 60
End: 2025-03-03
Payer: COMMERCIAL

## 2025-03-03 ENCOUNTER — HOSPITAL ENCOUNTER (OUTPATIENT)
Dept: CARDIOLOGY | Facility: CLINIC | Age: 60
Discharge: HOME | End: 2025-03-03
Payer: COMMERCIAL

## 2025-03-03 VITALS
DIASTOLIC BLOOD PRESSURE: 75 MMHG | SYSTOLIC BLOOD PRESSURE: 128 MMHG | OXYGEN SATURATION: 99 % | WEIGHT: 129 LBS | BODY MASS INDEX: 20.25 KG/M2 | TEMPERATURE: 98 F | HEART RATE: 74 BPM | HEIGHT: 67 IN

## 2025-03-03 DIAGNOSIS — Z01.818 PREOPERATIVE TESTING: ICD-10-CM

## 2025-03-03 DIAGNOSIS — N13.5 URETERAL STRICTURE, RIGHT: Primary | ICD-10-CM

## 2025-03-03 DIAGNOSIS — N13.5 URETERAL STRICTURE, RIGHT: ICD-10-CM

## 2025-03-03 DIAGNOSIS — Z01.818 PREOPERATIVE EXAMINATION: Primary | ICD-10-CM

## 2025-03-03 DIAGNOSIS — G89.29 CHRONIC LOW BACK PAIN WITH SCIATICA, SCIATICA LATERALITY UNSPECIFIED, UNSPECIFIED BACK PAIN LATERALITY: ICD-10-CM

## 2025-03-03 DIAGNOSIS — F11.90 CHRONIC, CONTINUOUS USE OF OPIOIDS: ICD-10-CM

## 2025-03-03 DIAGNOSIS — E21.3 HYPERPARATHYROIDISM: ICD-10-CM

## 2025-03-03 DIAGNOSIS — M54.40 CHRONIC LOW BACK PAIN WITH SCIATICA, SCIATICA LATERALITY UNSPECIFIED, UNSPECIFIED BACK PAIN LATERALITY: ICD-10-CM

## 2025-03-03 PROBLEM — M54.50 CHRONIC LOWER BACK PAIN: Status: ACTIVE | Noted: 2025-03-03

## 2025-03-03 LAB
ABO + RH BLD: NORMAL
ALBUMIN SERPL BCP-MCNC: 3.6 G/DL (ref 3.5–5.2)
ALP SERPL-CCNC: 131 U/L (ref 40–150)
ALT SERPL W/O P-5'-P-CCNC: 18 U/L (ref 10–44)
ANION GAP SERPL CALC-SCNC: 6 MMOL/L (ref 8–16)
AST SERPL-CCNC: 27 U/L (ref 10–40)
BACTERIA #/AREA URNS AUTO: ABNORMAL /HPF
BILIRUB SERPL-MCNC: 0.3 MG/DL (ref 0.1–1)
BILIRUB UR QL STRIP: NEGATIVE
BLD GP AB SCN CELLS X3 SERPL QL: NORMAL
BUN SERPL-MCNC: 12 MG/DL (ref 6–20)
CALCIUM SERPL-MCNC: 10.4 MG/DL (ref 8.7–10.5)
CHLORIDE SERPL-SCNC: 105 MMOL/L (ref 95–110)
CLARITY UR REFRACT.AUTO: ABNORMAL
CO2 SERPL-SCNC: 28 MMOL/L (ref 23–29)
COLOR UR AUTO: YELLOW
CREAT SERPL-MCNC: 0.7 MG/DL (ref 0.5–1.4)
EST. GFR  (NO RACE VARIABLE): >60 ML/MIN/1.73 M^2
GLUCOSE SERPL-MCNC: 71 MG/DL (ref 70–110)
GLUCOSE UR QL STRIP: NEGATIVE
HGB UR QL STRIP: ABNORMAL
HYALINE CASTS UR QL AUTO: 0 /LPF
KETONES UR QL STRIP: NEGATIVE
LEUKOCYTE ESTERASE UR QL STRIP: ABNORMAL
MICROSCOPIC COMMENT: ABNORMAL
NITRITE UR QL STRIP: NEGATIVE
OHS QRS DURATION: 88 MS
OHS QTC CALCULATION: 391 MS
PH UR STRIP: 7 [PH] (ref 5–8)
POTASSIUM SERPL-SCNC: 3.8 MMOL/L (ref 3.5–5.1)
PROT SERPL-MCNC: 6.8 G/DL (ref 6–8.4)
PROT UR QL STRIP: ABNORMAL
RBC #/AREA URNS AUTO: 51 /HPF (ref 0–4)
SODIUM SERPL-SCNC: 139 MMOL/L (ref 136–145)
SP GR UR STRIP: 1.01 (ref 1–1.03)
SPECIMEN OUTDATE: NORMAL
SQUAMOUS #/AREA URNS AUTO: 1 /HPF
T4 FREE SERPL-MCNC: 0.93 NG/DL (ref 0.71–1.51)
TSH SERPL DL<=0.005 MIU/L-ACNC: 0.19 UIU/ML (ref 0.4–4)
URN SPEC COLLECT METH UR: ABNORMAL
WBC #/AREA URNS AUTO: >100 /HPF (ref 0–5)
WBC CLUMPS UR QL AUTO: ABNORMAL

## 2025-03-03 PROCEDURE — 84439 ASSAY OF FREE THYROXINE: CPT | Performed by: ANESTHESIOLOGY

## 2025-03-03 PROCEDURE — 99204 OFFICE O/P NEW MOD 45 MIN: CPT | Mod: S$GLB,,, | Performed by: NURSE PRACTITIONER

## 2025-03-03 PROCEDURE — 87077 CULTURE AEROBIC IDENTIFY: CPT

## 2025-03-03 PROCEDURE — 3074F SYST BP LT 130 MM HG: CPT | Mod: CPTII,S$GLB,, | Performed by: NURSE PRACTITIONER

## 2025-03-03 PROCEDURE — 99999 PR PBB SHADOW E&M-EST. PATIENT-LVL III: CPT | Mod: PBBFAC,,, | Performed by: NURSE PRACTITIONER

## 2025-03-03 PROCEDURE — 87088 URINE BACTERIA CULTURE: CPT

## 2025-03-03 PROCEDURE — 3008F BODY MASS INDEX DOCD: CPT | Mod: CPTII,S$GLB,, | Performed by: NURSE PRACTITIONER

## 2025-03-03 PROCEDURE — 80053 COMPREHEN METABOLIC PANEL: CPT | Performed by: ANESTHESIOLOGY

## 2025-03-03 PROCEDURE — 87186 SC STD MICRODIL/AGAR DIL: CPT | Mod: 59

## 2025-03-03 PROCEDURE — 3078F DIAST BP <80 MM HG: CPT | Mod: CPTII,S$GLB,, | Performed by: NURSE PRACTITIONER

## 2025-03-03 PROCEDURE — 87086 URINE CULTURE/COLONY COUNT: CPT

## 2025-03-03 PROCEDURE — 84443 ASSAY THYROID STIM HORMONE: CPT | Performed by: ANESTHESIOLOGY

## 2025-03-03 PROCEDURE — 1159F MED LIST DOCD IN RCRD: CPT | Mod: CPTII,S$GLB,, | Performed by: NURSE PRACTITIONER

## 2025-03-03 PROCEDURE — 81001 URINALYSIS AUTO W/SCOPE: CPT

## 2025-03-03 PROCEDURE — 1160F RVW MEDS BY RX/DR IN RCRD: CPT | Mod: CPTII,S$GLB,, | Performed by: NURSE PRACTITIONER

## 2025-03-03 PROCEDURE — 99499 UNLISTED E&M SERVICE: CPT | Mod: S$GLB,,, | Performed by: UROLOGY

## 2025-03-03 PROCEDURE — 36415 COLL VENOUS BLD VENIPUNCTURE: CPT | Performed by: ANESTHESIOLOGY

## 2025-03-03 PROCEDURE — 93005 ELECTROCARDIOGRAM TRACING: CPT | Mod: S$GLB,,, | Performed by: ANESTHESIOLOGY

## 2025-03-03 PROCEDURE — 93010 ELECTROCARDIOGRAM REPORT: CPT | Mod: S$GLB,,, | Performed by: INTERNAL MEDICINE

## 2025-03-03 PROCEDURE — 86901 BLOOD TYPING SEROLOGIC RH(D): CPT | Performed by: ANESTHESIOLOGY

## 2025-03-03 NOTE — DISCHARGE INSTRUCTIONS
.Your surgery has been scheduled for:_____3/13/25_____________________________________    You should report to:  ____[facility], located on the [facility] side of the first floor of the           [facility] ([phone])  __X__[facility], located on the [facility] side of the            Second floor of the [facility] ([phone])  ____3rd Floor [facility] located on the [facility] side of the [facility] [phone]  ____[facility]: located [facility] [address] [facility] [place] [address]. [address].     Please Note   Tell your doctor if you take Aspirin, products containing Aspirin, herbal medications  or blood thinners, such as Coumadin, Ticlid, or Plavix.  (Consult your provider regarding holding or stopping before surgery).  Arrange for someone to drive you home following surgery.  You will not be allowed to leave the surgical facility alone or drive yourself home following sedation and anesthesia.    Before Surgery  Stop taking all herbal medications, vitamins, and supplements 7 days prior to surgery  No Motrin/Advil (Ibuprofen) 7 days before surgery  No Aleve (Naproxen) 7 days before surgery   No Goody's/BC Powder 7 days before surgery  Refrain from drinking alcoholic beverages for 24 hours before and after surgery  Stop or limit smoking at least 24 hours prior to surgery  You may take Tylenol for pain    Night before Surgery  Do not eat or drink after midnight  Take a shower or bath (shower is recommended).  Bathe with Hibiclens soap or an antibacterial soap from the neck down.  If not supplied by your surgeon, hibiclens soap will need to be purchased over the counter in pharmacy.  Rinse soap off thoroughly.  Shampoo your hair with your regular shampoo    The Day of Surgery  Take another bath or shower with hibiclens or any antibacterial soap, to reduce the chance of infection.  Take heart  and blood pressure medications with a small sip of water, as advised by the perioperative team.  Do not take fluid pills  You may brush your teeth and rinse your mouth, but do not swallow any additional water.   Do not apply perfumes, powder, body lotions or deodorant on the day of surgery.  Nail polish should be removed.  Do not wear makeup or moisturizer  Wear comfortable clothes, such as a button front shirt and loose fitting pants.  Leave all jewelry, including body piercings, and valuables at home.    Bring any devices you will need after surgery such as crutches or canes.  If you have sleep apnea, please bring your CPAP machine  In the event that your physical condition changes including the onset of a cold or respiratory illness, or if you have to delay or cancel your surgery, please notify your surgeon.

## 2025-03-03 NOTE — ASSESSMENT & PLAN NOTE
Taking Oxycodone-acetaminophen for chronic lower back pain.   Patient may have opioid tolerance due to chronic continuous opioid use. I recommend monitoring for opioid tolerance during the postoperative period and plan pain control that will assist the patient in the hospital as well as through the discharge process.

## 2025-03-03 NOTE — ASSESSMENT & PLAN NOTE
Current pain   No loss of bladder or bowel control    Denies N/T to buttocks, perineum and inner surfaces of the thighs (saddle anesthesia)    Followed per outside Pain Management- Dr. Ugarte in [place]  Taking Percocet/gabapentin  S/P Lumbar surgery 2019   Reports numbness to left leg  No imaging available

## 2025-03-03 NOTE — PROGRESS NOTES
Urology ([facility]) H&P for upcoming procedure  Staff:  Mesfin Mariee MD    CC: Right Ureteral Stricture     HPI:  Josh Ruth is a 59 y.o. male with a history of kidney stones.  He previously underwent a right open ureterolithotomy with Dr. Gamboa. He has had numerous other endoscopic evaluations including most recently a staged ureteroscopy with Dr. Ruth (7/3/24 & 8/7/24).     Mag 3 renal scan from 10/16/24 was independently reviewed today and reveals right renal obstruction, 32% right differential renal function compared to 68% left.     Patient is having regular flank and abdominal pain on the right side.  Denies any fevers or chills.  Here to discuss management of presumed right ureteral stricture and confirmed right renal obstruction.     Underwent an up and downagram on 1/30/25 showing a segment of 3.5cm proximal ureter stricture on the right.     Prior smoker.      No blood thinners     Abdominal surgery: prior right ureterolithotomy. Anterior approach spinal surgery.     ROS: Negative except for as stated above    Past Medical History:   Diagnosis Date    Arthritis     Chronic low back pain     Hypertension     Kidney stones     Parathyroid disorder 08/2024    Renal disorder     hydronephrosis       Past Surgical History:   Procedure Laterality Date    ANTEGRADE NEPHROSTOGRAPHY Right 1/30/2025    Procedure: Nephrostogram - antegrade;  Surgeon: Mesfin Mariee MD;  Location: [facility] OR [address] [place] [address];  Service: Urology;  Laterality: Right;    CYSTOGRAM  1/30/2025    Procedure: CYSTOGRAM;  Surgeon: Mesfin Mariee MD;  Location: [facility] OR [address] [place] [address];  Service: Urology;;    CYSTOSCOPIC LITHOLAPAXY  7/3/2024    Procedure: CYSTOLITHOLAPAXY;  Surgeon: Paul Ruth MD;  Location: [facility] OR;  Service: Urology;;    CYSTOSCOPY N/A 1/30/2025    Procedure: CYSTOSCOPY;  Surgeon: Mesfin Mariee MD;  Location: [facility] OR [address] [place] [address];  Service: Urology;  Laterality: N/A;    CYSTOSCOPY W/ LASER LITHOTRIPSY       CYSTOSCOPY W/ RETROGRADES Right 1/30/2025    Procedure: CYSTOSCOPY, WITH RETROGRADE PYELOGRAM;  Surgeon: Mesfin Mariee MD;  Location: [facility] OR [address] [place] [address];  Service: Urology;  Laterality: Right;    CYSTOSCOPY W/ URETERAL STENT REMOVAL Right 8/7/2024    Procedure: CYSTOSCOPY, WITH URETERAL STENT REMOVAL;  Surgeon: Paul Ruth MD;  Location: [facility] OR;  Service: Urology;  Laterality: Right;    CYSTOSCOPY WITH CALCULUS EXTRACTION Right 7/3/2024    Procedure: CYSTOSCOPY, WITH CALCULUS REMOVAL;  Surgeon: Paul Ruth MD;  Location: [facility] OR;  Service: Urology;  Laterality: Right;    CYSTOSCOPY WITH CALCULUS EXTRACTION Right 8/7/2024    Procedure: CYSTOSCOPY, WITH CALCULUS REMOVAL;  Surgeon: Paul Ruth MD;  Location: [facility] OR;  Service: Urology;  Laterality: Right;    CYSTOURETEROSCOPY WITH RETROGRADE PYELOGRAPHY AND INSERTION OF STENT INTO URETER Right 10/26/2023    Procedure: CYSTOURETEROSCOPY, WITH RETROGRADE PYELOGRAM AND URETERAL STENT INSERTION;  Surgeon: Paul Ruth MD;  Location: [facility] OR;  Service: Urology;  Laterality: Right;    CYSTOURETEROSCOPY WITH RETROGRADE PYELOGRAPHY AND INSERTION OF STENT INTO URETER Right 7/3/2024    Procedure: CYSTOURETEROSCOPY, WITH RETROGRADE PYELOGRAM AND URETERAL STENT INSERTION;  Surgeon: Paul Ruth MD;  Location: [facility] OR;  Service: Urology;  Laterality: Right;    CYSTOURETEROSCOPY WITH RETROGRADE PYELOGRAPHY AND INSERTION OF STENT INTO URETER Right 8/7/2024    Procedure: CYSTOURETEROSCOPY, WITH RETROGRADE PYELOGRAM AND URETERAL STENT INSERTION;  Surgeon: Paul Ruth MD;  Location: [facility] OR;  Service: Urology;  Laterality: Right;    LASER LITHOTRIPSY Right 10/26/2023    Procedure: LITHOTRIPSY, USING LASER-THULIUM;  Surgeon: Paul Ruth MD;  Location: [facility] OR;  Service: Urology;  Laterality: Right;    LASER LITHOTRIPSY Right 7/3/2024    Procedure: LITHOTRIPSY, USING LASER, Thulium;  Surgeon: Paul Ruth MD;  Location: [facility] OR;  Service: Urology;  Laterality: Right;     LASER LITHOTRIPSY Right 8/7/2024    Procedure: LITHOTRIPSY, USING LASER - Thulium;  Surgeon: Paul Ruth MD;  Location: [facility];  Service: Urology;  Laterality: Right;    LUMBAR FUSION  12/2019       Social History     Socioeconomic History    Marital status:    Tobacco Use    Smoking status: Never     Passive exposure: Never    Smokeless tobacco: Never   Substance and Sexual Activity    Alcohol use: Not Currently    Drug use: Yes     Types: Marijuana     Comment: weekly     Social Drivers of Health     Financial Resource Strain: Medium Risk (2/19/2025)    Overall Financial Resource Strain (CARDIA)     Difficulty of Paying Living Expenses: Somewhat hard   Food Insecurity: No Food Insecurity (2/19/2025)    Hunger Vital Sign     Worried About Running Out of Food in the Last Year: Never true     Ran Out of Food in the Last Year: Never true   Transportation Needs: No Transportation Needs (2/19/2025)    PRAPARE - Transportation     Lack of Transportation (Medical): No     Lack of Transportation (Non-Medical): No   Physical Activity: Sufficiently Active (2/19/2025)    Exercise Vital Sign     Days of Exercise per Week: 5 days     Minutes of Exercise per Session: 150+ min   Stress: No Stress Concern Present (2/19/2025)    [employer] [employer] - Occupational Stress Questionnaire     Feeling of Stress : Not at all   Housing Stability: Low Risk  (2/19/2025)    Housing Stability Vital Sign     Unable to Pay for Housing in the Last Year: No     Number of Times Moved in the Last Year: 0     Homeless in the Last Year: No       Family History   Problem Relation Name Age of Onset    No Known Problems Mother      No Known Problems Father EffieneelamKevin        Review of patient's allergies indicates:   Allergen Reactions    Pcn [penicillins] Hives       Medications Ordered Prior to Encounter[1]    Anticoagulation:  No     Physical Exam:  Estimated body mass index is 19.58 kg/m² as calculated from the  "following:    Height as of 2/19/25: 5' 7" (1.702 m).    Weight as of 2/19/25: 56.7 kg (125 lb).     General: No acute distress, well developed. AAOx3  Head: Normocephalic, Atraumatic  Eyes: Extra-occular movements intact, No discharge  Neck: supple, symmetrical, trachea midline  Lungs: normal respiratory effort, no respiratory distress, no wheezes  CV: regular rate, 2+ pulses  Abdomen: soft, non-tender, non-distended, no organomegaly. Transverse scar on right flank. Low vertical midline incision.  MSK: no edema, no deformities, normal ROM  Skin: skin color, texture, turgor normal.  Neurologic: no focal deficits, sensation intact     Labs:    Urine dipstick today shows positive for nitrites, leukocytes and trace blood.    Lab Results   Component Value Date    WBC 11.77 12/23/2024    HGB 14.6 12/23/2024    HCT 41.8 12/23/2024    MCV 89 12/23/2024     12/23/2024           BMP  Lab Results   Component Value Date     01/29/2025    K 4.8 01/29/2025     01/29/2025    CO2 27 01/29/2025    BUN 13 01/29/2025    CREATININE 0.9 01/29/2025    CALCIUM 10.5 01/29/2025    ANIONGAP 5 (L) 01/29/2025    EGFRNORACEVR >60.0 01/29/2025       Imaging:   CT UROGRAM ABD PELVIS W WO     CLINICAL HISTORY:  hydronephrosis;Personal history of urinary calculi     TECHNIQUE:  Low dose axial, sagittal and coronal reformations were obtained from the lung bases to the pubic symphysis before and following the IV administration of 125 mL of Omnipaque 350.  Timing was optimized for nephrogram and excretory renal phases.     COMPARISON:  Multiple priors including renogram 10/16/2024, CT 06/11/2024 and 08/31/2023     FINDINGS:  Mild atelectasis in the lung bases.  Normal size heart.  Unremarkable gallbladder.  Chronic mild dilation of the common bile duct.     Hemorrhagic cyst at 2 sites along the left kidney upper and mid poles.  There is a simple cyst at left kidney midpole and additional hypodense lesions along each kidney cortex " too small to adequately characterize.  There are several stones in the right renal collecting system largest which measures 4 mm.  There are several stones in the left renal collecting system largest which measures 11 mm.  There are several focal areas of cortical thinning on the right kidney suggesting remote injury, a chronic finding.  No abnormal renal enhancement with symmetric renal cortical perfusion.  There is mild right hydro nephrosis.  The right proximal ureter is dilated for 4-5 cm beyond the pelvis with a transition point noted at a region of surgical clips (as seen axial series 4, image 100 and coronal series 603, image 54).  No abnormal filling defect in the opacified segments of the renal collecting systems, ureters or urinary bladder, noting poor opacification of the right ureter..     There are bilateral adrenal adenomas on the right measuring 2.3 cm and left measuring 1.4 cm.  Simple cyst measuring 1 cm in the right hepatic lobe and another along the anterior margin left hepatic lobe. Several additional hepatic hypodense lesions too small to adequately characterize.  Remaining solid abdominal organs are unremarkable.     There is no enteric contrast which limits bowel assessment.  No dilated bowel loops.     Prostate is 5 cm diameter.  No urinary bladder wall thickening.  Atherosclerosis.     Fusion hardware L4 through S1.  Advanced degenerative disc disease at L3-4 noting that the right transpedicular screw at L4 encroaches the superior L4 endplate.  This is a chronic finding.     Impression:     1. Mild hydroureteronephrosis on the right to the mid ureteral level occurring in a site of several retroperitoneal surgical clips.  Degree of obstruction is improved relative to the most recent CT.  Some considerations include stenosis/stricture or possibly an adynamic segment.  2. Bilateral nephrolithiasis.  3. Simple and hemorrhagic cysts along the left kidney cortex.  4. Prostate enlargement  warranting correlation with PSA.  5. Bilateral benign adrenal nodules.        Electronically signed by:Hong Bundy  Date:                                            11/11/2024  Time:                                           16:17    Assessment: Josh Ruth is a 59 y.o. male with a right ureteral stricture.    Plan:     1. To OR on 3/13/2025 for right ureteral stricture repair.   2. Pre-operative clearance with Gayle Mccormick NP on 3/3/2025. Will follow up.   3. Type and screen ordered preoperatively. The risks, benefits, and indications of a blood transfusion were discussed. The patient was given a chance to ask questions and all questions answered to his satisfaction. Consent obtained.   5. Urine for culture       Lake Molina MD          [1]   Current Outpatient Medications on File Prior to Visit   Medication Sig Dispense Refill    cholecalciferol, vitamin D3, 1,250 mcg (50,000 unit) capsule Take 50,000 Units by mouth every 7 days.      gabapentin (NEURONTIN) 800 MG tablet Take 800 mg by mouth 3 (three) times daily as needed.      oxyCODONE-acetaminophen (PERCOCET)  mg per tablet Take 1 tablet by mouth every 4 (four) hours.      propranoloL (INDERAL) 10 MG tablet Take 10 mg by mouth 2 (two) times daily.       Current Facility-Administered Medications on File Prior to Visit   Medication Dose Route Frequency Provider Last Rate Last Admin    lactated ringers infusion   Intravenous Continuous Rick Barron MD 20 mL/hr at 10/26/23 0834 New Bag at 10/26/23 0834

## 2025-03-07 ENCOUNTER — RESULTS FOLLOW-UP (OUTPATIENT)
Dept: UROLOGY | Facility: CLINIC | Age: 60
End: 2025-03-07

## 2025-03-07 ENCOUNTER — DOCUMENTATION ONLY (OUTPATIENT)
Dept: UROLOGY | Facility: HOSPITAL | Age: 60
End: 2025-03-07
Payer: COMMERCIAL

## 2025-03-07 ENCOUNTER — TELEPHONE (OUTPATIENT)
Dept: UROLOGY | Facility: CLINIC | Age: 60
End: 2025-03-07
Payer: COMMERCIAL

## 2025-03-07 LAB
BACTERIA UR CULT: ABNORMAL
BACTERIA UR CULT: ABNORMAL

## 2025-03-07 RX ORDER — SULFAMETHOXAZOLE AND TRIMETHOPRIM 800; 160 MG/1; MG/1
1 TABLET ORAL 2 TIMES DAILY
Qty: 14 TABLET | Refills: 0 | Status: SHIPPED | OUTPATIENT
Start: 2025-03-07 | End: 2025-03-14

## 2025-03-07 RX ORDER — SULFAMETHOXAZOLE AND TRIMETHOPRIM 800; 160 MG/1; MG/1
1 TABLET ORAL 2 TIMES DAILY
Qty: 28 TABLET | Refills: 0 | Status: ON HOLD | OUTPATIENT
Start: 2025-03-07 | End: 2025-03-16 | Stop reason: HOSPADM

## 2025-03-07 NOTE — PROGRESS NOTES
Urology Progress Note    The patient was contacted regarding his positive urine culture results. Urine culture growing E. Coli and Staph, both sensitive to Bactrim. Antibiotics have been sent to his local pharmacy. He was instructed to start today and continue through his surgery.     Please call with further questions or concerns.    Lake Molina MD  Urology, PGY-2  [facility]

## 2025-03-07 NOTE — TELEPHONE ENCOUNTER
Spoke with patient.  Advised that antibiotic has been called in for him.  Patient states he'd already spoken with Dr. Molina about it.  Advised to take medication as prescribed beginning today.  Patient verbalized understanding.

## 2025-03-08 LAB
BACTERIA UR CULT: ABNORMAL
BACTERIA UR CULT: ABNORMAL

## 2025-03-10 ENCOUNTER — HOSPITAL ENCOUNTER (OUTPATIENT)
Dept: ENDOCRINOLOGY | Facility: CLINIC | Age: 60
Discharge: HOME | End: 2025-03-10
Attending: INTERNAL MEDICINE
Payer: COMMERCIAL

## 2025-03-10 DIAGNOSIS — E04.2 MULTIPLE THYROID NODULES: ICD-10-CM

## 2025-03-10 PROCEDURE — 88173 CYTOPATH EVAL FNA REPORT: CPT | Performed by: STUDENT IN AN ORGANIZED HEALTH CARE EDUCATION/TRAINING PROGRAM

## 2025-03-10 PROCEDURE — 10005 FNA BX W/US GDN 1ST LES: CPT | Mod: S$GLB,,, | Performed by: INTERNAL MEDICINE

## 2025-03-10 PROCEDURE — 88173 CYTOPATH EVAL FNA REPORT: CPT | Mod: 26,,, | Performed by: STUDENT IN AN ORGANIZED HEALTH CARE EDUCATION/TRAINING PROGRAM

## 2025-03-12 ENCOUNTER — TELEPHONE (OUTPATIENT)
Dept: UROLOGY | Facility: CLINIC | Age: 60
End: 2025-03-12
Payer: COMMERCIAL

## 2025-03-12 ENCOUNTER — ANESTHESIA EVENT (OUTPATIENT)
Dept: SURGERY | Facility: HOSPITAL | Age: 60
End: 2025-03-12
Payer: COMMERCIAL

## 2025-03-12 ENCOUNTER — HOSPITAL ENCOUNTER (INPATIENT)
Facility: HOSPITAL | Age: 60
LOS: 4 days | Discharge: HOME | DRG: 661 | End: 2025-03-16
Attending: UROLOGY | Admitting: UROLOGY
Payer: COMMERCIAL

## 2025-03-12 DIAGNOSIS — N13.1 HYDRONEPHROSIS DUE TO OBSTRUCTION OF URETER: Primary | ICD-10-CM

## 2025-03-12 DIAGNOSIS — Z01.818 PREOPERATIVE TESTING: ICD-10-CM

## 2025-03-12 DIAGNOSIS — N39.0 UTI (URINARY TRACT INFECTION): ICD-10-CM

## 2025-03-12 LAB
ANION GAP SERPL CALC-SCNC: 10 MMOL/L (ref 8–16)
BASOPHILS # BLD AUTO: 0.07 K/UL (ref 0–0.2)
BASOPHILS NFR BLD: 0.5 % (ref 0–1.9)
BUN SERPL-MCNC: 20 MG/DL (ref 6–20)
CALCIUM SERPL-MCNC: 11.1 MG/DL (ref 8.7–10.5)
CHLORIDE SERPL-SCNC: 107 MMOL/L (ref 95–110)
CO2 SERPL-SCNC: 23 MMOL/L (ref 23–29)
CREAT SERPL-MCNC: 1.1 MG/DL (ref 0.5–1.4)
DIFFERENTIAL METHOD BLD: ABNORMAL
EOSINOPHIL # BLD AUTO: 0.2 K/UL (ref 0–0.5)
EOSINOPHIL NFR BLD: 1.4 % (ref 0–8)
ERYTHROCYTE [DISTWIDTH] IN BLOOD BY AUTOMATED COUNT: 12.5 % (ref 11.5–14.5)
EST. GFR  (NO RACE VARIABLE): >60 ML/MIN/1.73 M^2
FINAL PATHOLOGIC DIAGNOSIS: NORMAL
GLUCOSE SERPL-MCNC: 119 MG/DL (ref 70–110)
HCT VFR BLD AUTO: 40.8 % (ref 40–54)
HGB BLD-MCNC: 14.1 G/DL (ref 14–18)
IMM GRANULOCYTES # BLD AUTO: 0.04 K/UL (ref 0–0.04)
IMM GRANULOCYTES NFR BLD AUTO: 0.3 % (ref 0–0.5)
LYMPHOCYTES # BLD AUTO: 3.9 K/UL (ref 1–4.8)
LYMPHOCYTES NFR BLD: 29.3 % (ref 18–48)
Lab: NORMAL
MAGNESIUM SERPL-MCNC: 1.6 MG/DL (ref 1.6–2.6)
MCH RBC QN AUTO: 31.3 PG (ref 27–31)
MCHC RBC AUTO-ENTMCNC: 34.6 G/DL (ref 32–36)
MCV RBC AUTO: 91 FL (ref 82–98)
MICROSCOPIC EXAM: NORMAL
MONOCYTES # BLD AUTO: 1 K/UL (ref 0.3–1)
MONOCYTES NFR BLD: 7.5 % (ref 4–15)
NEUTROPHILS # BLD AUTO: 8.1 K/UL (ref 1.8–7.7)
NEUTROPHILS NFR BLD: 61 % (ref 38–73)
NRBC BLD-RTO: 0 /100 WBC
PHOSPHATE SERPL-MCNC: 3.3 MG/DL (ref 2.7–4.5)
PLATELET # BLD AUTO: 217 K/UL (ref 150–450)
PMV BLD AUTO: 9.2 FL (ref 9.2–12.9)
POTASSIUM SERPL-SCNC: 3.9 MMOL/L (ref 3.5–5.1)
RBC # BLD AUTO: 4.5 M/UL (ref 4.6–6.2)
SODIUM SERPL-SCNC: 140 MMOL/L (ref 136–145)
WBC # BLD AUTO: 13.3 K/UL (ref 3.9–12.7)

## 2025-03-12 PROCEDURE — 85025 COMPLETE CBC W/AUTO DIFF WBC: CPT

## 2025-03-12 PROCEDURE — 83735 ASSAY OF MAGNESIUM: CPT

## 2025-03-12 PROCEDURE — 63600175 PHARM REV CODE 636 W HCPCS: Performed by: UROLOGY

## 2025-03-12 PROCEDURE — 84100 ASSAY OF PHOSPHORUS: CPT

## 2025-03-12 PROCEDURE — 80048 BASIC METABOLIC PNL TOTAL CA: CPT

## 2025-03-12 PROCEDURE — 11000001 HC ACUTE MED/SURG PRIVATE ROOM

## 2025-03-12 PROCEDURE — 36415 COLL VENOUS BLD VENIPUNCTURE: CPT

## 2025-03-12 RX ORDER — LIDOCAINE HYDROCHLORIDE 10 MG/ML
1 INJECTION, SOLUTION EPIDURAL; INFILTRATION; INTRACAUDAL; PERINEURAL ONCE AS NEEDED
Status: DISCONTINUED | OUTPATIENT
Start: 2025-03-12 | End: 2025-03-16 | Stop reason: HOSPADM

## 2025-03-12 RX ORDER — CEFEPIME HYDROCHLORIDE 1 G/1
1 INJECTION, POWDER, FOR SOLUTION INTRAMUSCULAR; INTRAVENOUS
Status: DISCONTINUED | OUTPATIENT
Start: 2025-03-13 | End: 2025-03-15

## 2025-03-12 RX ORDER — ONDANSETRON 8 MG/1
8 TABLET, ORALLY DISINTEGRATING ORAL EVERY 8 HOURS PRN
Status: DISCONTINUED | OUTPATIENT
Start: 2025-03-12 | End: 2025-03-14

## 2025-03-12 RX ORDER — ACETAMINOPHEN 325 MG/1
650 TABLET ORAL EVERY 8 HOURS PRN
Status: DISCONTINUED | OUTPATIENT
Start: 2025-03-12 | End: 2025-03-14

## 2025-03-12 RX ORDER — TALC
6 POWDER (GRAM) TOPICAL NIGHTLY PRN
Status: DISCONTINUED | OUTPATIENT
Start: 2025-03-12 | End: 2025-03-16 | Stop reason: HOSPADM

## 2025-03-12 RX ORDER — OXYCODONE HYDROCHLORIDE 10 MG/1
10 TABLET ORAL EVERY 4 HOURS PRN
Status: DISCONTINUED | OUTPATIENT
Start: 2025-03-12 | End: 2025-03-16 | Stop reason: HOSPADM

## 2025-03-12 RX ORDER — ACETAMINOPHEN 325 MG/1
650 TABLET ORAL EVERY 4 HOURS PRN
Status: DISCONTINUED | OUTPATIENT
Start: 2025-03-12 | End: 2025-03-14

## 2025-03-12 RX ORDER — OXYCODONE HYDROCHLORIDE 5 MG/1
5 TABLET ORAL EVERY 4 HOURS PRN
Status: DISCONTINUED | OUTPATIENT
Start: 2025-03-12 | End: 2025-03-16 | Stop reason: HOSPADM

## 2025-03-12 RX ORDER — CEFEPIME HYDROCHLORIDE 1 G/1
1 INJECTION, POWDER, FOR SOLUTION INTRAMUSCULAR; INTRAVENOUS ONCE
Status: COMPLETED | OUTPATIENT
Start: 2025-03-12 | End: 2025-03-12

## 2025-03-12 RX ORDER — SODIUM CHLORIDE 0.9 % (FLUSH) 0.9 %
10 SYRINGE (ML) INJECTION
Status: DISCONTINUED | OUTPATIENT
Start: 2025-03-12 | End: 2025-03-16 | Stop reason: HOSPADM

## 2025-03-12 RX ADMIN — CEFEPIME 1 G: 1 INJECTION, POWDER, FOR SOLUTION INTRAMUSCULAR; INTRAVENOUS at 09:03

## 2025-03-12 NOTE — ANESTHESIA PREPROCEDURE EVALUATION
[facility]  Anesthesia Pre-Operative Evaluation   03/12/2025        Josh Ruth, 1965  [id number]  Procedure(s) (LRB):  DV5 ROBOTIC URETEROURETEROSTOMY (Right)    Subjective    Josh Ruth is a 60 y.o. male w/ a significant PMHx of hyperparathyroidism, hypertension, former smoker, kidney stones, and hydronephrosis due to a right ureteral obstruction.    Patient now presents for above procedure(s).       Prev Airway: None documented.    LDA:        Nephrostomy 02/19/25 1304 Right 10 Fr. (Active)   Characteristics no redness;no warmth;no tenderness;no drainage 02/19/25 1345   Dressing transparent dressing 02/19/25 1345   Dressing Type Dry dressing 02/19/25 1345   Collection Container Belly bag 02/19/25 1345   Number of days: 20       Drips: None documented.      Problem List[1]    Review of patient's allergies indicates:   Allergen Reactions    Pcn [penicillins] Hives       Current Inpatient Medications:       Medications Ordered Prior to Encounter[2]    Past Surgical History:   Procedure Laterality Date    ANTEGRADE NEPHROSTOGRAPHY Right 1/30/2025    Procedure: Nephrostogram - antegrade;  Surgeon: Mesfin Mariee MD;  Location: [facility] OR [address] [place] [address];  Service: Urology;  Laterality: Right;    CYSTOGRAM  1/30/2025    Procedure: CYSTOGRAM;  Surgeon: Mesfin Mariee MD;  [facility];  Service: Urology;;    CYSTOSCOPIC LITHOLAPAXY  7/3/2024    Procedure: CYSTOLITHOLAPAXY;  Surgeon: Paul Ruth MD;  Location: [facility] OR;  Service: Urology;;    CYSTOSCOPY N/A 1/30/2025    Procedure: CYSTOSCOPY;  Surgeon: Mesfin Mariee MD;  [facility];  Service: Urology;  Laterality: N/A;    CYSTOSCOPY W/ LASER LITHOTRIPSY      CYSTOSCOPY W/ RETROGRADES Right 1/30/2025    Procedure: CYSTOSCOPY, WITH RETROGRADE PYELOGRAM;  Surgeon: Mesfin Mariee MD;  [facility]: [facility];  Service: Urology;  Laterality: Right;    CYSTOSCOPY W/ URETERAL STENT REMOVAL  Right 8/7/2024    Procedure: CYSTOSCOPY, WITH URETERAL STENT REMOVAL;  Surgeon: Paul Ruth MD;  Location: [facility] OR;  Service: Urology;  Laterality: Right;    CYSTOSCOPY WITH CALCULUS EXTRACTION Right 7/3/2024    Procedure: CYSTOSCOPY, WITH CALCULUS REMOVAL;  Surgeon: Paul Ruth MD;  Location: [facility] OR;  Service: Urology;  Laterality: Right;    CYSTOSCOPY WITH CALCULUS EXTRACTION Right 8/7/2024    Procedure: CYSTOSCOPY, WITH CALCULUS REMOVAL;  Surgeon: Paul Ruth MD;  Location: [facility] OR;  Service: Urology;  Laterality: Right;    CYSTOURETEROSCOPY WITH RETROGRADE PYELOGRAPHY AND INSERTION OF STENT INTO URETER Right 10/26/2023    Procedure: CYSTOURETEROSCOPY, WITH RETROGRADE PYELOGRAM AND URETERAL STENT INSERTION;  Surgeon: Paul Ruth MD;  Location: [facility] OR;  Service: Urology;  Laterality: Right;    CYSTOURETEROSCOPY WITH RETROGRADE PYELOGRAPHY AND INSERTION OF STENT INTO URETER Right 7/3/2024    Procedure: CYSTOURETEROSCOPY, WITH RETROGRADE PYELOGRAM AND URETERAL STENT INSERTION;  Surgeon: Paul Ruth MD;  Location: [facility] OR;  Service: Urology;  Laterality: Right;    CYSTOURETEROSCOPY WITH RETROGRADE PYELOGRAPHY AND INSERTION OF STENT INTO URETER Right 8/7/2024    Procedure: CYSTOURETEROSCOPY, WITH RETROGRADE PYELOGRAM AND URETERAL STENT INSERTION;  Surgeon: Paul Ruth MD;  Location: [facility] OR;  Service: Urology;  Laterality: Right;    LASER LITHOTRIPSY Right 10/26/2023    Procedure: LITHOTRIPSY, USING LASER-THULIUM;  Surgeon: Paul Ruth MD;  Location: [facility] OR;  Service: Urology;  Laterality: Right;    LASER LITHOTRIPSY Right 7/3/2024    Procedure: LITHOTRIPSY, USING LASER, Thulium;  Surgeon: Paul Ruth MD;  Location: [facility] OR;  Service: Urology;  Laterality: Right;    LASER LITHOTRIPSY Right 8/7/2024    Procedure: LITHOTRIPSY, USING LASER - Thulium;  Surgeon: Paul Ruth MD;  Location: [facility] OR;  Service: Urology;  Laterality: Right;    LUMBAR FUSION  12/2019       Social History:  Tobacco Use: Low  Risk  (3/3/2025)    Patient History     Smoking Tobacco Use: Never     Smokeless Tobacco Use: Never     Passive Exposure: Never       Alcohol Use: Not At Risk (2/19/2025)    AUDIT-C     Frequency of Alcohol Consumption: Never     Average Number of Drinks: Patient does not drink     Frequency of Binge Drinking: Never       Objective    Vital Signs Range:  BMI Readings from Last 1 Encounters:   03/03/25 20.20 kg/m²               Significant Labs:        Component Value Date/Time    WBC 11.77 12/23/2024 1359    HGB 14.6 12/23/2024 1359    HCT 41.8 12/23/2024 1359     12/23/2024 1359     03/03/2025 1503    K 3.8 03/03/2025 1503     03/03/2025 1503    CO2 28 03/03/2025 1503    GLU 71 03/03/2025 1503    BUN 12 03/03/2025 1503    CREATININE 0.7 03/03/2025 1503    PHOS 2.2 (L) 01/29/2025 0850    CALCIUM 10.4 03/03/2025 1503    ALBUMIN 3.6 03/03/2025 1503    PROT 6.8 03/03/2025 1503    ALKPHOS 131 03/03/2025 1503    BILITOT 0.3 03/03/2025 1503    AST 27 03/03/2025 1503    ALT 18 03/03/2025 1503    INR 1.0 12/23/2024 1359        Please see Results Review for additional labs.     Diagnostic Studies: All relevant studies, reviewed.      EKG:   Results for orders placed or performed during the hospital encounter of 03/03/25   EKG 12-lead    Collection Time: 03/03/25  2:23 PM   Result Value Ref Range    QRS Duration 88 ms    [facility] QTC Calculation 391 ms    Narrative    Test Reason : Z01.818,    Vent. Rate :  71 BPM     Atrial Rate :  71 BPM     P-R Int : 146 ms          QRS Dur :  88 ms      QT Int : 360 ms       P-R-T Axes :  74  81  67 degrees    QTcB Int : 391 ms    Normal sinus rhythm  Normal ECG  No previous ECGs available  Confirmed by Rupert Pappas ([id number]) on 3/3/2025 4:25:22 PM    Referred By: TAL EMANUEL           Confirmed By: Rupert Pappas       ECHO:  No results found for this or any previous visit.         Pre-op Assessment    I have reviewed the Patient Summary Reports.     I have reviewed the Nursing  Notes. I have reviewed the NPO Status.   I have reviewed the Medications.     Review of Systems  Anesthesia Hx:  No problems with previous Anesthesia               Denies Personal Hx of Anesthesia complications.                    Social:  Former Smoker       Cardiovascular:     Hypertension                                          Pulmonary:  Pulmonary Normal                       Renal/:  Chronic Renal Disease (hydronephrosis)                Hepatic/GI:  Hepatic/GI Normal                    Neurological:  Neurology Normal                                          Physical Exam  General: Well nourished, Alert, Oriented and Cooperative    Airway:  Mallampati: III / II  Mouth Opening: Normal  TM Distance: Normal  Tongue: Normal  Neck ROM: Normal ROM    Dental:  Intact    Chest/Lungs:  Normal Respiratory Rate    Heart:  Rate: Normal        Anesthesia Plan  Type of Anesthesia, risks & benefits discussed:    Anesthesia Type: Gen ETT  Intra-op Monitoring Plan: Standard ASA Monitors  Post Op Pain Control Plan: multimodal analgesia and IV/PO Opioids PRN  Induction:  IV  Airway Plan: Video, Post-Induction  Informed Consent: Informed consent signed with the Patient and all parties understand the risks and agree with anesthesia plan.  All questions answered.   ASA Score: 2  Day of Surgery Review of History & Physical: H&P Update referred to the surgeon/provider.    Ready For Surgery From Anesthesia Perspective.     .           [1]   Patient Active Problem List  Diagnosis    Kidney stones    Hydronephrosis due to obstruction of ureter    Hyperparathyroidism    Hypercalcemia    Ureteral stricture, right    Chronic lower back pain    Chronic, continuous use of opioids   [2]   Current Facility-Administered Medications on File Prior to Encounter   Medication Dose Route Frequency Provider Last Rate Last Admin    lactated ringers infusion   Intravenous Continuous Rick Barron MD 20 mL/hr at 10/26/23 0834 New Bag at 10/26/23  0834     Current Outpatient Medications on File Prior to Encounter   Medication Sig Dispense Refill    cholecalciferol, vitamin D3, 1,250 mcg (50,000 unit) capsule Take 50,000 Units by mouth every 7 days.      gabapentin (NEURONTIN) 800 MG tablet Take 800 mg by mouth 3 (three) times daily as needed.      oxyCODONE-acetaminophen (PERCOCET)  mg per tablet Take 1 tablet by mouth every 4 (four) hours.      propranoloL (INDERAL) 10 MG tablet Take 10 mg by mouth 2 (two) times daily.

## 2025-03-12 NOTE — TELEPHONE ENCOUNTER
Instructions for Day of Surgery    Report To:    JUNG, 2nd floor of [facility]    Arrival time: 9:30    Night Before Surgery     Nothing to eat or drink after midnight the night before your surgery  Take medications as instructed the morning of surgery  No alcoholic beverages 24 hours prior to surgery

## 2025-03-13 ENCOUNTER — ANESTHESIA (OUTPATIENT)
Dept: SURGERY | Facility: HOSPITAL | Age: 60
End: 2025-03-13
Payer: COMMERCIAL

## 2025-03-13 LAB
ANION GAP SERPL CALC-SCNC: 9 MMOL/L (ref 8–16)
BASOPHILS # BLD AUTO: 0.05 K/UL (ref 0–0.2)
BASOPHILS NFR BLD: 0.4 % (ref 0–1.9)
BUN SERPL-MCNC: 17 MG/DL (ref 6–20)
CALCIUM SERPL-MCNC: 10.5 MG/DL (ref 8.7–10.5)
CHLORIDE SERPL-SCNC: 108 MMOL/L (ref 95–110)
CO2 SERPL-SCNC: 22 MMOL/L (ref 23–29)
CREAT SERPL-MCNC: 0.9 MG/DL (ref 0.5–1.4)
DIFFERENTIAL METHOD BLD: ABNORMAL
EOSINOPHIL # BLD AUTO: 0.2 K/UL (ref 0–0.5)
EOSINOPHIL NFR BLD: 1.7 % (ref 0–8)
ERYTHROCYTE [DISTWIDTH] IN BLOOD BY AUTOMATED COUNT: 12.6 % (ref 11.5–14.5)
EST. GFR  (NO RACE VARIABLE): >60 ML/MIN/1.73 M^2
GLUCOSE SERPL-MCNC: 111 MG/DL (ref 70–110)
HCT VFR BLD AUTO: 42.8 % (ref 40–54)
HGB BLD-MCNC: 14.6 G/DL (ref 14–18)
IMM GRANULOCYTES # BLD AUTO: 0.04 K/UL (ref 0–0.04)
IMM GRANULOCYTES NFR BLD AUTO: 0.3 % (ref 0–0.5)
LYMPHOCYTES # BLD AUTO: 3 K/UL (ref 1–4.8)
LYMPHOCYTES NFR BLD: 24.7 % (ref 18–48)
MAGNESIUM SERPL-MCNC: 1.7 MG/DL (ref 1.6–2.6)
MCH RBC QN AUTO: 31.1 PG (ref 27–31)
MCHC RBC AUTO-ENTMCNC: 34.1 G/DL (ref 32–36)
MCV RBC AUTO: 91 FL (ref 82–98)
MONOCYTES # BLD AUTO: 0.8 K/UL (ref 0.3–1)
MONOCYTES NFR BLD: 6.9 % (ref 4–15)
NEUTROPHILS # BLD AUTO: 8.1 K/UL (ref 1.8–7.7)
NEUTROPHILS NFR BLD: 66 % (ref 38–73)
NRBC BLD-RTO: 0 /100 WBC
PHOSPHATE SERPL-MCNC: 2.4 MG/DL (ref 2.7–4.5)
PLATELET # BLD AUTO: 193 K/UL (ref 150–450)
PMV BLD AUTO: 9.6 FL (ref 9.2–12.9)
POTASSIUM SERPL-SCNC: 4.2 MMOL/L (ref 3.5–5.1)
RBC # BLD AUTO: 4.69 M/UL (ref 4.6–6.2)
SODIUM SERPL-SCNC: 139 MMOL/L (ref 136–145)
WBC # BLD AUTO: 12.25 K/UL (ref 3.9–12.7)

## 2025-03-13 PROCEDURE — 76942 ECHO GUIDE FOR BIOPSY: CPT

## 2025-03-13 PROCEDURE — 63600175 PHARM REV CODE 636 W HCPCS

## 2025-03-13 PROCEDURE — 0TC64ZZ EXTIRPATION OF MATTER FROM RIGHT URETER, PERCUTANEOUS ENDOSCOPIC APPROACH: ICD-10-PCS | Performed by: UROLOGY

## 2025-03-13 PROCEDURE — 80048 BASIC METABOLIC PNL TOTAL CA: CPT

## 2025-03-13 PROCEDURE — 63600175 PHARM REV CODE 636 W HCPCS: Performed by: STUDENT IN AN ORGANIZED HEALTH CARE EDUCATION/TRAINING PROGRAM

## 2025-03-13 PROCEDURE — 25000003 PHARM REV CODE 250: Performed by: UROLOGY

## 2025-03-13 PROCEDURE — 0TU647Z SUPPLEMENT RIGHT URETER WITH AUTOLOGOUS TISSUE SUBSTITUTE, PERCUTANEOUS ENDOSCOPIC APPROACH: ICD-10-PCS | Performed by: UROLOGY

## 2025-03-13 PROCEDURE — 36000712 HC OR TIME LEV V 1ST 15 MIN: Performed by: UROLOGY

## 2025-03-13 PROCEDURE — 85025 COMPLETE CBC W/AUTO DIFF WBC: CPT

## 2025-03-13 PROCEDURE — 63600175 PHARM REV CODE 636 W HCPCS: Performed by: UROLOGY

## 2025-03-13 PROCEDURE — 71000015 HC POSTOP RECOV 1ST HR: Performed by: UROLOGY

## 2025-03-13 PROCEDURE — 63600175 PHARM REV CODE 636 W HCPCS: Mod: JZ,TB

## 2025-03-13 PROCEDURE — 84100 ASSAY OF PHOSPHORUS: CPT

## 2025-03-13 PROCEDURE — 25000003 PHARM REV CODE 250: Performed by: STUDENT IN AN ORGANIZED HEALTH CARE EDUCATION/TRAINING PROGRAM

## 2025-03-13 PROCEDURE — 83735 ASSAY OF MAGNESIUM: CPT

## 2025-03-13 PROCEDURE — 36415 COLL VENOUS BLD VENIPUNCTURE: CPT

## 2025-03-13 PROCEDURE — 8E0W4CZ ROBOTIC ASSISTED PROCEDURE OF TRUNK REGION, PERCUTANEOUS ENDOSCOPIC APPROACH: ICD-10-PCS | Performed by: UROLOGY

## 2025-03-13 PROCEDURE — 37000008 HC ANESTHESIA 1ST 15 MINUTES: Performed by: UROLOGY

## 2025-03-13 PROCEDURE — C1747 OPTIME ENDOSCOPE, SINGLE, URINARY TRACT: HCPCS | Performed by: UROLOGY

## 2025-03-13 PROCEDURE — 0T768DZ DILATION OF RIGHT URETER WITH INTRALUMINAL DEVICE, VIA NATURAL OR ARTIFICIAL OPENING ENDOSCOPIC: ICD-10-PCS | Performed by: UROLOGY

## 2025-03-13 PROCEDURE — 25000003 PHARM REV CODE 250

## 2025-03-13 PROCEDURE — C1769 GUIDE WIRE: HCPCS | Performed by: UROLOGY

## 2025-03-13 PROCEDURE — 71000016 HC POSTOP RECOV ADDL HR: Performed by: UROLOGY

## 2025-03-13 PROCEDURE — 37000009 HC ANESTHESIA EA ADD 15 MINS: Performed by: UROLOGY

## 2025-03-13 PROCEDURE — 88305 TISSUE EXAM BY PATHOLOGIST: CPT | Performed by: PATHOLOGY

## 2025-03-13 PROCEDURE — C1758 CATHETER, URETERAL: HCPCS | Performed by: UROLOGY

## 2025-03-13 PROCEDURE — 36000713 HC OR TIME LEV V EA ADD 15 MIN: Performed by: UROLOGY

## 2025-03-13 PROCEDURE — 0CB40ZZ EXCISION OF BUCCAL MUCOSA, OPEN APPROACH: ICD-10-PCS | Performed by: UROLOGY

## 2025-03-13 PROCEDURE — 88305 TISSUE EXAM BY PATHOLOGIST: CPT | Mod: 26,,, | Performed by: PATHOLOGY

## 2025-03-13 PROCEDURE — 71000033 HC RECOVERY, INTIAL HOUR: Performed by: UROLOGY

## 2025-03-13 PROCEDURE — 11000001 HC ACUTE MED/SURG PRIVATE ROOM

## 2025-03-13 PROCEDURE — 27201423 OPTIME MED/SURG SUP & DEVICES STERILE SUPPLY: Performed by: UROLOGY

## 2025-03-13 PROCEDURE — 63600175 PHARM REV CODE 636 W HCPCS: Mod: JZ,TB | Performed by: STUDENT IN AN ORGANIZED HEALTH CARE EDUCATION/TRAINING PROGRAM

## 2025-03-13 DEVICE — FISH SKIN FOR SURGICAL USE. INTENDED USE KERECIS® SURGIBIND™ IS INDICATED FOR IMPLANTATION TO REINFORCE SOFT TISSUE WHERE WEAKNESS EXISTS IN PATIENTS REQUIRING SOFT TISSUE REPAIR OR REINFORCEMENT IN PLASTIC OR RECONSTRUCTIVE SURGERY. IFU: HTTPS://WWW.KERECIS.COM/IFUS/IFU-KERECIS-SURGIBIND/
Type: IMPLANTABLE DEVICE | Site: MOUTH | Status: FUNCTIONAL
Brand: KERECIS® SURGIBIND™

## 2025-03-13 RX ORDER — FENTANYL CITRATE 50 UG/ML
25 INJECTION, SOLUTION INTRAMUSCULAR; INTRAVENOUS EVERY 5 MIN PRN
Refills: 0 | Status: COMPLETED | OUTPATIENT
Start: 2025-03-13 | End: 2025-03-13

## 2025-03-13 RX ORDER — SODIUM CHLORIDE 0.9 % (FLUSH) 0.9 %
10 SYRINGE (ML) INJECTION
Status: DISCONTINUED | OUTPATIENT
Start: 2025-03-13 | End: 2025-03-13 | Stop reason: HOSPADM

## 2025-03-13 RX ORDER — FENTANYL CITRATE 50 UG/ML
25-200 INJECTION, SOLUTION INTRAMUSCULAR; INTRAVENOUS
Refills: 0 | Status: DISCONTINUED | OUTPATIENT
Start: 2025-03-13 | End: 2025-03-13 | Stop reason: HOSPADM

## 2025-03-13 RX ORDER — HYDRALAZINE HYDROCHLORIDE 20 MG/ML
INJECTION INTRAMUSCULAR; INTRAVENOUS
Status: DISCONTINUED | OUTPATIENT
Start: 2025-03-13 | End: 2025-03-13

## 2025-03-13 RX ORDER — GLUCAGON 1 MG
1 KIT INJECTION
Status: DISCONTINUED | OUTPATIENT
Start: 2025-03-13 | End: 2025-03-13 | Stop reason: HOSPADM

## 2025-03-13 RX ORDER — ACETAMINOPHEN 500 MG
1000 TABLET ORAL
Status: DISCONTINUED | OUTPATIENT
Start: 2025-03-13 | End: 2025-03-13 | Stop reason: HOSPADM

## 2025-03-13 RX ORDER — LABETALOL HYDROCHLORIDE 5 MG/ML
INJECTION, SOLUTION INTRAVENOUS
Status: DISCONTINUED | OUTPATIENT
Start: 2025-03-13 | End: 2025-03-13

## 2025-03-13 RX ORDER — CEFAZOLIN 2 G/1
2 INJECTION, POWDER, FOR SOLUTION INTRAMUSCULAR; INTRAVENOUS
Status: DISCONTINUED | OUTPATIENT
Start: 2025-03-13 | End: 2025-03-13 | Stop reason: HOSPADM

## 2025-03-13 RX ORDER — SODIUM CHLORIDE 9 MG/ML
INJECTION, SOLUTION INTRAVENOUS CONTINUOUS
Status: DISCONTINUED | OUTPATIENT
Start: 2025-03-13 | End: 2025-03-15

## 2025-03-13 RX ORDER — BUPIVACAINE HYDROCHLORIDE 7.5 MG/ML
INJECTION, SOLUTION EPIDURAL; RETROBULBAR
Status: COMPLETED | OUTPATIENT
Start: 2025-03-13 | End: 2025-03-13

## 2025-03-13 RX ORDER — HYDROMORPHONE HYDROCHLORIDE 1 MG/ML
INJECTION, SOLUTION INTRAMUSCULAR; INTRAVENOUS; SUBCUTANEOUS
Status: DISPENSED
Start: 2025-03-13 | End: 2025-03-14

## 2025-03-13 RX ORDER — KETOROLAC TROMETHAMINE 15 MG/ML
15 INJECTION, SOLUTION INTRAMUSCULAR; INTRAVENOUS ONCE
Status: COMPLETED | OUTPATIENT
Start: 2025-03-13 | End: 2025-03-13

## 2025-03-13 RX ORDER — HALOPERIDOL LACTATE 5 MG/ML
0.5 INJECTION, SOLUTION INTRAMUSCULAR EVERY 10 MIN PRN
Status: DISCONTINUED | OUTPATIENT
Start: 2025-03-13 | End: 2025-03-13 | Stop reason: HOSPADM

## 2025-03-13 RX ORDER — ONDANSETRON HYDROCHLORIDE 2 MG/ML
INJECTION, SOLUTION INTRAVENOUS
Status: DISCONTINUED | OUTPATIENT
Start: 2025-03-13 | End: 2025-03-13

## 2025-03-13 RX ORDER — ACETAMINOPHEN 500 MG
1000 TABLET ORAL
Status: COMPLETED | OUTPATIENT
Start: 2025-03-13 | End: 2025-03-13

## 2025-03-13 RX ORDER — LIDOCAINE HYDROCHLORIDE 20 MG/ML
INJECTION INTRAVENOUS
Status: DISCONTINUED | OUTPATIENT
Start: 2025-03-13 | End: 2025-03-13

## 2025-03-13 RX ORDER — FENTANYL CITRATE 50 UG/ML
INJECTION, SOLUTION INTRAMUSCULAR; INTRAVENOUS
Status: COMPLETED
Start: 2025-03-13 | End: 2025-03-13

## 2025-03-13 RX ORDER — KETAMINE HCL IN 0.9 % NACL 50 MG/5 ML
SYRINGE (ML) INTRAVENOUS
Status: DISCONTINUED | OUTPATIENT
Start: 2025-03-13 | End: 2025-03-13

## 2025-03-13 RX ORDER — HYDROMORPHONE HYDROCHLORIDE 1 MG/ML
INJECTION, SOLUTION INTRAMUSCULAR; INTRAVENOUS; SUBCUTANEOUS
Status: DISCONTINUED | OUTPATIENT
Start: 2025-03-13 | End: 2025-03-13

## 2025-03-13 RX ORDER — MIDAZOLAM HYDROCHLORIDE 1 MG/ML
.5-4 INJECTION, SOLUTION INTRAMUSCULAR; INTRAVENOUS
Status: DISCONTINUED | OUTPATIENT
Start: 2025-03-13 | End: 2025-03-13 | Stop reason: HOSPADM

## 2025-03-13 RX ORDER — HEPARIN SODIUM 5000 [USP'U]/ML
5000 INJECTION, SOLUTION INTRAVENOUS; SUBCUTANEOUS
Status: COMPLETED | OUTPATIENT
Start: 2025-03-13 | End: 2025-03-13

## 2025-03-13 RX ORDER — FENTANYL CITRATE 50 UG/ML
INJECTION, SOLUTION INTRAMUSCULAR; INTRAVENOUS
Status: DISCONTINUED | OUTPATIENT
Start: 2025-03-13 | End: 2025-03-13

## 2025-03-13 RX ORDER — ROCURONIUM BROMIDE 10 MG/ML
INJECTION, SOLUTION INTRAVENOUS
Status: DISCONTINUED | OUTPATIENT
Start: 2025-03-13 | End: 2025-03-13

## 2025-03-13 RX ORDER — HYDROMORPHONE HYDROCHLORIDE 1 MG/ML
0.5 INJECTION, SOLUTION INTRAMUSCULAR; INTRAVENOUS; SUBCUTANEOUS EVERY 6 HOURS PRN
Status: DISCONTINUED | OUTPATIENT
Start: 2025-03-13 | End: 2025-03-16 | Stop reason: HOSPADM

## 2025-03-13 RX ORDER — DEXAMETHASONE SODIUM PHOSPHATE 4 MG/ML
INJECTION, SOLUTION INTRA-ARTICULAR; INTRALESIONAL; INTRAMUSCULAR; INTRAVENOUS; SOFT TISSUE
Status: DISCONTINUED | OUTPATIENT
Start: 2025-03-13 | End: 2025-03-13

## 2025-03-13 RX ORDER — PROPOFOL 10 MG/ML
VIAL (ML) INTRAVENOUS
Status: DISCONTINUED | OUTPATIENT
Start: 2025-03-13 | End: 2025-03-13

## 2025-03-13 RX ORDER — DEXMEDETOMIDINE HYDROCHLORIDE 100 UG/ML
INJECTION, SOLUTION INTRAVENOUS
Status: DISCONTINUED | OUTPATIENT
Start: 2025-03-13 | End: 2025-03-13

## 2025-03-13 RX ORDER — PREGABALIN 75 MG/1
150 CAPSULE ORAL
Status: COMPLETED | OUTPATIENT
Start: 2025-03-13 | End: 2025-03-13

## 2025-03-13 RX ADMIN — SODIUM CHLORIDE, SODIUM GLUCONATE, SODIUM ACETATE, POTASSIUM CHLORIDE, MAGNESIUM CHLORIDE, SODIUM PHOSPHATE, DIBASIC, AND POTASSIUM PHOSPHATE: .53; .5; .37; .037; .03; .012; .00082 INJECTION, SOLUTION INTRAVENOUS at 01:03

## 2025-03-13 RX ADMIN — ROCURONIUM BROMIDE 10 MG: 10 INJECTION, SOLUTION INTRAVENOUS at 02:03

## 2025-03-13 RX ADMIN — DEXAMETHASONE SODIUM PHOSPHATE 4 MG: 4 INJECTION, SOLUTION INTRAMUSCULAR; INTRAVENOUS at 01:03

## 2025-03-13 RX ADMIN — ROCURONIUM BROMIDE 10 MG: 10 INJECTION, SOLUTION INTRAVENOUS at 03:03

## 2025-03-13 RX ADMIN — SUGAMMADEX 200 MG: 100 INJECTION, SOLUTION INTRAVENOUS at 06:03

## 2025-03-13 RX ADMIN — HEPARIN SODIUM 5000 UNITS: 5000 INJECTION INTRAVENOUS; SUBCUTANEOUS at 10:03

## 2025-03-13 RX ADMIN — OXYCODONE HYDROCHLORIDE 10 MG: 10 TABLET ORAL at 08:03

## 2025-03-13 RX ADMIN — Medication 10 MG: at 03:03

## 2025-03-13 RX ADMIN — Medication 20 MG: at 04:03

## 2025-03-13 RX ADMIN — MIDAZOLAM 1 MG: 1 INJECTION INTRAMUSCULAR; INTRAVENOUS at 12:03

## 2025-03-13 RX ADMIN — BUPIVACAINE HYDROCHLORIDE 30 ML: 7.5 INJECTION, SOLUTION EPIDURAL; RETROBULBAR at 12:03

## 2025-03-13 RX ADMIN — LABETALOL HYDROCHLORIDE 5 MG: 5 INJECTION, SOLUTION INTRAVENOUS at 04:03

## 2025-03-13 RX ADMIN — PREGABALIN 150 MG: 75 CAPSULE ORAL at 12:03

## 2025-03-13 RX ADMIN — ROCURONIUM BROMIDE 20 MG: 10 INJECTION, SOLUTION INTRAVENOUS at 04:03

## 2025-03-13 RX ADMIN — FENTANYL CITRATE 25 MCG: 50 INJECTION, SOLUTION INTRAMUSCULAR; INTRAVENOUS at 02:03

## 2025-03-13 RX ADMIN — SODIUM CHLORIDE: 0.9 INJECTION, SOLUTION INTRAVENOUS at 12:03

## 2025-03-13 RX ADMIN — HYDROMORPHONE HYDROCHLORIDE 1 MG: 1 INJECTION, SOLUTION INTRAMUSCULAR; INTRAVENOUS; SUBCUTANEOUS at 07:03

## 2025-03-13 RX ADMIN — CEFEPIME 1 G: 1 INJECTION, POWDER, FOR SOLUTION INTRAMUSCULAR; INTRAVENOUS at 06:03

## 2025-03-13 RX ADMIN — ROCURONIUM BROMIDE 50 MG: 10 INJECTION, SOLUTION INTRAVENOUS at 01:03

## 2025-03-13 RX ADMIN — ROCURONIUM BROMIDE 20 MG: 10 INJECTION, SOLUTION INTRAVENOUS at 03:03

## 2025-03-13 RX ADMIN — ACETAMINOPHEN 1000 MG: 500 TABLET ORAL at 10:03

## 2025-03-13 RX ADMIN — HYDROMORPHONE HYDROCHLORIDE 0.5 MG: 1 INJECTION, SOLUTION INTRAMUSCULAR; INTRAVENOUS; SUBCUTANEOUS at 06:03

## 2025-03-13 RX ADMIN — FENTANYL CITRATE 50 MCG: 50 INJECTION INTRAMUSCULAR; INTRAVENOUS at 12:03

## 2025-03-13 RX ADMIN — SODIUM CHLORIDE, SODIUM GLUCONATE, SODIUM ACETATE, POTASSIUM CHLORIDE, MAGNESIUM CHLORIDE, SODIUM PHOSPHATE, DIBASIC, AND POTASSIUM PHOSPHATE: .53; .5; .37; .037; .03; .012; .00082 INJECTION, SOLUTION INTRAVENOUS at 04:03

## 2025-03-13 RX ADMIN — ONDANSETRON 4 MG: 2 INJECTION INTRAMUSCULAR; INTRAVENOUS at 06:03

## 2025-03-13 RX ADMIN — HYDROMORPHONE HYDROCHLORIDE 0.5 MG: 1 INJECTION, SOLUTION INTRAMUSCULAR; INTRAVENOUS; SUBCUTANEOUS at 09:03

## 2025-03-13 RX ADMIN — PROPOFOL 160 MG: 10 INJECTION, EMULSION INTRAVENOUS at 01:03

## 2025-03-13 RX ADMIN — HYDRALAZINE HYDROCHLORIDE 5 MG: 20 INJECTION INTRAMUSCULAR; INTRAVENOUS at 04:03

## 2025-03-13 RX ADMIN — FENTANYL CITRATE 25 MCG: 50 INJECTION, SOLUTION INTRAMUSCULAR; INTRAVENOUS at 03:03

## 2025-03-13 RX ADMIN — DEXMEDETOMIDINE 4 MCG: 100 INJECTION, SOLUTION, CONCENTRATE INTRAVENOUS at 03:03

## 2025-03-13 RX ADMIN — FENTANYL CITRATE 25 MCG: 50 INJECTION, SOLUTION INTRAMUSCULAR; INTRAVENOUS at 08:03

## 2025-03-13 RX ADMIN — SODIUM CHLORIDE: 9 INJECTION, SOLUTION INTRAVENOUS at 07:03

## 2025-03-13 RX ADMIN — FENTANYL CITRATE 25 MCG: 50 INJECTION INTRAMUSCULAR; INTRAVENOUS at 07:03

## 2025-03-13 RX ADMIN — FENTANYL CITRATE 100 MCG: 50 INJECTION, SOLUTION INTRAMUSCULAR; INTRAVENOUS at 01:03

## 2025-03-13 RX ADMIN — PROPOFOL 20 MG: 10 INJECTION, EMULSION INTRAVENOUS at 02:03

## 2025-03-13 RX ADMIN — FENTANYL CITRATE 25 MCG: 50 INJECTION INTRAMUSCULAR; INTRAVENOUS at 08:03

## 2025-03-13 RX ADMIN — ROCURONIUM BROMIDE 20 MG: 10 INJECTION, SOLUTION INTRAVENOUS at 02:03

## 2025-03-13 RX ADMIN — CEFEPIME 1 G: 1 INJECTION, POWDER, FOR SOLUTION INTRAMUSCULAR; INTRAVENOUS at 01:03

## 2025-03-13 RX ADMIN — ROCURONIUM BROMIDE 20 MG: 10 INJECTION, SOLUTION INTRAVENOUS at 05:03

## 2025-03-13 RX ADMIN — CEFEPIME 1 G: 1 INJECTION, POWDER, FOR SOLUTION INTRAMUSCULAR; INTRAVENOUS at 04:03

## 2025-03-13 RX ADMIN — ONDANSETRON 8 MG: 8 TABLET, ORALLY DISINTEGRATING ORAL at 10:03

## 2025-03-13 RX ADMIN — LIDOCAINE HYDROCHLORIDE 100 MG: 20 INJECTION INTRAVENOUS at 01:03

## 2025-03-13 RX ADMIN — KETOROLAC TROMETHAMINE 15 MG: 15 INJECTION, SOLUTION INTRAMUSCULAR; INTRAVENOUS at 12:03

## 2025-03-13 RX ADMIN — Medication 20 MG: at 03:03

## 2025-03-13 RX ADMIN — FENTANYL CITRATE 50 MCG: 50 INJECTION, SOLUTION INTRAMUSCULAR; INTRAVENOUS at 02:03

## 2025-03-13 RX ADMIN — DEXMEDETOMIDINE 4 MCG: 100 INJECTION, SOLUTION, CONCENTRATE INTRAVENOUS at 05:03

## 2025-03-13 RX ADMIN — ROCURONIUM BROMIDE 20 MG: 10 INJECTION, SOLUTION INTRAVENOUS at 06:03

## 2025-03-13 NOTE — SUBJECTIVE & OBJECTIVE
"Interval History: NAEON. VSS. Right nephrostomy tube with clear yellow output. Received IV antibiotics overnight. Plan for robotic right ureteral stricture repair today.    Objective:     Temp:  [97.7 °F (36.5 °C)-97.8 °F (36.6 °C)] 97.7 °F (36.5 °C)  Pulse:  [55-71] 55  Resp:  [16-18] 18  SpO2:  [94 %-97 %] 97 %  BP: (118-135)/(66-74) 132/74     Body mass index is 20.37 kg/m².           Drains       Drain  Duration                  Nephrostomy 02/19/25 1304 Right 10 Fr. 21 days                     Physical Exam  Vitals and nursing note reviewed.   Constitutional:       General: He is not in acute distress.     Appearance: Normal appearance. He is not ill-appearing or toxic-appearing.   Cardiovascular:      Rate and Rhythm: Normal rate.   Pulmonary:      Effort: Pulmonary effort is normal. No respiratory distress.      Breath sounds: No wheezing.   Abdominal:      General: Abdomen is flat. There is no distension.      Palpations: Abdomen is soft.      Tenderness: There is no abdominal tenderness. There is no guarding.      Comments: Right nephrostomy tube with clear yellow urine   Skin:     Coloration: Skin is not jaundiced.   Neurological:      General: No focal deficit present.      Mental Status: He is alert and oriented to person, place, and time. Mental status is at baseline.   Psychiatric:         Mood and Affect: Mood normal.         Behavior: Behavior normal.         Thought Content: Thought content normal.         Judgment: Judgment normal.           Significant Labs:    BMP:  Recent Labs   Lab 03/12/25 2042      K 3.9      CO2 23   BUN 20   CREATININE 1.1   CALCIUM 11.1*       CBC:   Recent Labs   Lab 03/12/25 2042   WBC 13.30*   HGB 14.1   HCT 40.8          Urine Culture: No results for input(s): "LABURIN" in the last 168 hours.  Urine Studies: No results for input(s): "COLORU", "APPEARANCEUA", "PHUR", "SPECGRAV", "PROTEINUA", "GLUCUA", "KETONESU", "BILIRUBINUA", "OCCULTUA", " ""NITRITE", "UROBILINOGEN", "LEUKOCYTESUR", "RBCUA", "WBCUA", "BACTERIA", "SQUAMEPITHEL", "HYALINECASTS" in the last 168 hours.    Invalid input(s): "TONY"    Significant Imaging:  All pertinent imaging results/findings from the past 24 hours have been reviewed.    "

## 2025-03-13 NOTE — PROGRESS NOTES
[facility]  Urology  Progress Note    Patient Name: Josh Ruth  MRN: [id number]  Admission Date: 3/12/2025  Hospital Length of Stay: 1 days  Code Status: Full Code   Attending Provider: Mesfin Mariee MD   Primary Care Physician: Rolando Rangel MD    Subjective:     HPI: Josh Ruth is a 60 year old male with a right ureteral stricture who is being admitted for IV antibiotics in preparation for upcoming procedure. The patient is set to undergo a robotic right ureteral stricture repair on 3/13/2025, he will receive cefepime immediately and another dose in the morning.     The patient reports that he is feeling well and denies a recent history of fevers, chills, nausea or vomiting. He states that the urine from his nephrostomy bag has cleared.      No blood thinners.     Interval History: NAEON. VSS. Right nephrostomy tube with clear yellow output. Received IV antibiotics overnight. Plan for robotic right ureteral stricture repair today.    Objective:     Temp:  [97.7 °F (36.5 °C)-97.8 °F (36.6 °C)] 97.7 °F (36.5 °C)  Pulse:  [55-71] 55  Resp:  [16-18] 18  SpO2:  [94 %-97 %] 97 %  BP: (118-135)/(66-74) 132/74     Body mass index is 20.37 kg/m².           Drains       Drain  Duration                  Nephrostomy 02/19/25 1304 Right 10 Fr. 21 days                     Physical Exam  Vitals and nursing note reviewed.   Constitutional:       General: He is not in acute distress.     Appearance: Normal appearance. He is not ill-appearing or toxic-appearing.   Cardiovascular:      Rate and Rhythm: Normal rate.   Pulmonary:      Effort: Pulmonary effort is normal. No respiratory distress.      Breath sounds: No wheezing.   Abdominal:      General: Abdomen is flat. There is no distension.      Palpations: Abdomen is soft.      Tenderness: There is no abdominal tenderness. There is no guarding.      Comments: Right nephrostomy tube with clear yellow urine   Skin:     Coloration: Skin is not  "jaundiced.   Neurological:      General: No focal deficit present.      Mental Status: He is alert and oriented to person, place, and time. Mental status is at baseline.   Psychiatric:         Mood and Affect: Mood normal.         Behavior: Behavior normal.         Thought Content: Thought content normal.         Judgment: Judgment normal.           Significant Labs:    BMP:  Recent Labs   Lab 03/12/25 2042      K 3.9      CO2 23   BUN 20   CREATININE 1.1   CALCIUM 11.1*       CBC:   Recent Labs   Lab 03/12/25 2042   WBC 13.30*   HGB 14.1   HCT 40.8          Urine Culture: No results for input(s): "LABURIN" in the last 168 hours.  Urine Studies: No results for input(s): "COLORU", "APPEARANCEUA", "PHUR", "SPECGRAV", "PROTEINUA", "GLUCUA", "KETONESU", "BILIRUBINUA", "OCCULTUA", "NITRITE", "UROBILINOGEN", "LEUKOCYTESUR", "RBCUA", "WBCUA", "BACTERIA", "SQUAMEPITHEL", "HYALINECASTS" in the last 168 hours.    Invalid input(s): "WRIGHTSUR"    Significant Imaging:  All pertinent imaging results/findings from the past 24 hours have been reviewed.      Assessment/Plan:     Hydronephrosis due to obstruction of ureter  Josh Ruth is a 60 y.o. male with a right ureteral stricture. Plan for robotic right ureteral stricture repair today (3/13/25).    - Continue cefepime 1g Q12   - Diet: NPO  - PRN MMPC  - Strict I/Os  - Labs pending this AM; will fu  - Plan for OR today; site marked    Dispo: OR today        Puma Villar MD  Urology  [facility] - Surgery  "

## 2025-03-13 NOTE — PROGRESS NOTES
Pharmacist Renal Dose Adjustment Note    Josh Ruth is a 60 y.o. male being treated with the medication cefepime for UTI.     Patient Data:    Vital Signs (Most Recent):  Temp: 97.8 °F (36.6 °C) (03/12/25 1953)  Pulse: 71 (03/12/25 1953)  Resp: 16 (03/12/25 1953)  BP: 135/69 (03/12/25 1953)  SpO2: (!) 94 % (03/12/25 1953) Vital Signs (72h Range):  Temp:  [97.8 °F (36.6 °C)]   Pulse:  [71]   Resp:  [16]   BP: (135)/(69)   SpO2:  [94 %]      Recent Labs   Lab 03/12/25 2042   CREATININE 1.1     Serum creatinine: 1.1 mg/dL 03/12/25 2042  Estimated creatinine clearance: 60.3 mL/min    Per protocol for a mild to moderate infection & CrCl 30-60 ml/min, dose will be changed from 1 gram IV every 12 hours to 1 gram IV every 8 hours (usual dose is 1 g q6h). SCr is 1.1, up from baseline 0.7.    Pharmacist's Name: Katherine E Mcardle

## 2025-03-13 NOTE — ASSESSMENT & PLAN NOTE
Josh Ruth is a 60 y.o. male with a right ureteral stricture. Plan for robotic right ureteral stricture repair today (3/13/25).    - Continue cefepime 1g Q12   - Diet: NPO  - PRN MMPC  - Strict I/Os  - Labs pending this AM; will fu  - Plan for OR today; site marked    Dispo: OR today

## 2025-03-13 NOTE — NURSING
Pt AAOx4. Arrived to unit independently from clinic. Vital signs as charted. PIV placed, tolerated well. Neph tube in place to R side at arrival. Tolerating diet. Numbness to L foot, states since previous back surgery. Pt resting. Safety measures in place.

## 2025-03-13 NOTE — BRIEF OP NOTE
[facility]  Brief Operative Note    SUMMARY     Surgery Date: 3/13/2025     Surgeons and Role:     * Mesfin Mariee MD - Primary     * René Valle MD - Assisting     * Brina Dinero MD - Resident - Assisting     * Jeovanny Rousseau MD - Resident - Assisting        Pre-op Diagnosis:  Ureteral stricture, right [N13.5]    Post-op Diagnosis:  Post-Op Diagnosis Codes:     * Ureteral stricture, right [N13.5]    Procedure(s) (LRB):  DV5 ROBOTIC URETEROURETEROSTOMY (Right)  CYSTOSCOPY, FLEXIBLE (N/A)  URETEROSCOPY (Right)  CYSTOSCOPY, WITH URETERAL STENT INSERTION (Right)  SURGICAL PROCUREMENT, GRAFT, BUCCAL MUCOSA (Left)  ROBOTIC URETEROLITHOTOMY (Right)    Anesthesia: General/Regional    Implants:  Implant Name Type Inv. Item Serial No.  Lot No. LRB No. Used Action   KERECIS SURGIBIND [id number] CM    KERECIS [id number]  1 Implanted       Operative Findings:   - Right proximal ureteral stricture about 4 cm, visualized with ureteroscope.   - Left buccal mucosa harvested.  - Kerecis implanted onto buccal mucosa bed.  - Right proximal dorsal onlay buccal ureteroplasty performed.   - Right nephrostomy tube removed.  - Right ureteral stent placed.    Estimated Blood Loss: * No values recorded between 3/13/2025  1:21 PM and 3/13/2025  6:52 PM *    Estimated Blood Loss has not been documented. EBL = 50 mL.         Specimens:   Specimen (24h ago, onward)      None            [id number]

## 2025-03-13 NOTE — NURSING
Pt transporting off the floor to [facility]. ID and allergy bands in place. Pt in gown only, transporting via wheelchair.

## 2025-03-13 NOTE — OP NOTE
[facility]  Urology Department  Operative Note    Date of Procedure: 3/13/2025     Pre-Operative Diagnosis:   Ureteral stricture, right [N13.5]    Active Problem List with Overview Notes    Diagnosis Date Noted    Chronic lower back pain 03/03/2025    Chronic, continuous use of opioids 03/03/2025    Ureteral stricture, right 03/02/2025    Hyperparathyroidism 10/22/2024    Hypercalcemia 10/22/2024    Kidney stones 09/05/2023    Hydronephrosis due to obstruction of ureter 09/05/2023         Post-Operative Diagnosis: same    Procedure:   Procedure(s) (LRB):  DV5 ROBOTIC URETEROURETEROSTOMY (Right)  CYSTOSCOPY, FLEXIBLE (N/A)  URETEROSCOPY (Right)  CYSTOSCOPY, WITH URETERAL STENT INSERTION (Right)  SURGICAL PROCUREMENT, GRAFT, BUCCAL MUCOSA (Left)  ROBOTIC URETEROLITHOTOMY (Right)  REMOVAL, NEPHROSTOMY TUBE, WITH IMAGING GUIDANCE     Primary: Mesfin Mariee MD  Assisting: René Valle MD  Resident - Assisting: Jeovanny Rousseau MD; Brina Dinero MD        Anesthesia: General/Regional    Estimated Blood Loss (EBL): 50min     Drains:   6 x 26 JJ right ureteral stent without strings  16 Fr Talavera catheter  15 Sage drain    Specimens: ureteral stricture with clip    Indications:   Josh Ruth is a 60 y.o. male with right proximal ureteral stricture. After the risks, benefits, and alternatives were discussed and all questions were answered to his satisfaction, he elected to proceed with surgery.    Operative Findings:   - Right proximal ureteral stricture measured about 4 cm, visualized with ureteroscope.   - Robotic right ureterolithotomy  - Left buccal mucosa harvested.  - Kerecis implanted onto buccal mucosa bed.  - Right proximal dorsal onlay buccal ureteroplasty performed.   - Right nephrostomy tube removed.  - Right ureteral stent placed.    Description of the Procedure: After discussion of risks and benefits of the procedure, informed consent was obtained. Subcutaneous  heparin was administered in pre-operative area. The patient was brought to the operating room and placed supine on the operating table.  SCDs were applied and working prior to induction of anesthesia.  General anesthesia was administered.    An OG tube was placed. The patient was then moved into the flank position with the right side up. The patient was appropriately padded and secured to the table. The patient was then prepped and draped in the usual sterile fashion. Timeout was performed and preoperative antibiotics were confirmed.       Flexible cystoscopy was performed.  A motion wire was inserted into the right ureter.  The cystoscope was removed leaving the wire in place. The wire was secured to the sterile field with a hemostat. A 16 Fr Talavera catheter was placed in the standard fashion with 10 ml sterile water used to inflate the balloon.    A Veress needle was introduced into the abdomen.  Entry into the peritoneal cavity was confirmed with the drop test and an initial insufflation pressure of <10mmHg.  Aspiration during our drop test revealed no blood or succus.  The peritoneal cavity was insufflated up to 15 mmHg and the Veress was removed. The location of the camera port was marked with a marking pen and incised sharply using a 15 blade.  The 8 mm robotic camera port was then introduced.  The camera was passed through the port and the abdomen was inspected and found to be free of bowel or vascular injury.  Using direct vision the other 3 robotic ports were placed in a line down the left abdomen, ensuring at least 8 cm between ports to allow robotic mobility.  A 12 mm assistant port was placed superior to the umbilicus. A 5 mm assistant port was placed superior to this.  Each trocar was introduced under direct vision ensuring no injury to the underlying abdominal contents.       Dissection began along the white line of Toldt, reflecting the colon medially away from the kidney up to the hepatic flexure.   The psoas muscle was identified. Once the colon was reflected, dissection was carried out just below the kidney, and the ureter was identified. The proximal ureter was identified and was dilated. The ureter was elevated and we continued our dissection toward the lower pole of the kidney and all the way down to the level of the ureteral narrowing and past this. Care was taken to atraumatically manipulate the ureter and to maintain ureteral blood supply and adventitia.     The murillo catheter was removed from the patient. The flexible ureteroscope was advanced over the wire into the ureter.  This was used to delineate the proximal and distal end of the ureteral stricture.  The anterior ureter was incised using cold scissors until healthy tissue appeared from our previous delineation.  A ureteral stone was identified proximal to the stricture, and this was removed.  A 5 Fr open ended ureteral catheter was advanced over the wire and the ureteral stricture measured 4 cm in length.    Dr. Valle then harvested buccal mucosa from the left cheek, measuring 4.5 cm by 2.5 cm.  Please see his op note for further details. Kerecis graft was then implanted into the bed from the buccal mucosa harvest site.     We then passed the buccal mucosa into the abdomen atraumatically.  Dorsal onlay buccal ureteroplasty was then performed.  The anastomosis was performed using two 5-0 PDS sutures, running 1 from the lateral wall and 1 from the medial wall.  Prior to completion of the anastomosis, a 6 x 26 cm JJ ureteral stent was passed retrograde over the wire into the level of the renal pelvis.  The distal coil was visualized in the bladder via flexible cystoscope. The 16 Fr Murillo catheter was then replaced with 10 cc of sterile water inflated in the balloon, this was secured to his leg with a statlock. The nephrostomy tube was then removed in standard fashion, ensuring the ureteral stent remained in place. A dressing was applied to the  nephrostomy tube removal site. The anastomosis was completed and appeared tension-free and watertight.     A retroperitoneal flap was then used to surround the anastomosis, using 3-0 Vicryl.     Pneumoperitoneum pressure was dropped and the surgical field was inspected. Hemostasis was confirmed. A 15 mm katelynn drain was placed through the right lower quadrant trocar.     The robot was undocked and all trocars were removed.    The 12 mm assistant port was closed with a 0 PDS using a port closure device.      All skin incisions were closed using 4-0 monocryl. The drain was secured in standard fashion using 2-0 Nylon. Drain sponges and tape were used as dressing. Dermabond was applied to the incisions.      A correct needle and sponge count was confirmed.     The patient tolerated the procedure well and was transferred to the recovery room in stable condition.    Disposition: The patient will be transferred back to the floor for continued observation.    Brina Dinero MD    I have reviewed the operative note performed by Dr. Dinero, and I concur with her/his documentation of Josh Ruth. I was present for the critical or key portions of the procedure.

## 2025-03-13 NOTE — PLAN OF CARE
[facility] - Surgery  Initial Discharge Assessment       Primary Care Provider: Rolando Rangel MD    Admission Diagnosis: Ureteral stricture [N13.5]  UTI (urinary tract infection) [N39.0]  Preoperative testing [Z01.818]    Admission Date: 3/12/2025  Expected Discharge Date: 3/14/2025    Transition of Care Barriers: None    Payor: BLUE CROSS BLUE SHIELD / Plan: BLUE CONNECT / Product Type: HMO /     Extended Emergency Contact Information  Primary Emergency Contact: eKvin Ruth  Mobile Phone: [phone]  Relation: Father  Preferred language: English   needed? No    Discharge Plan A: Home with family  Discharge Plan B: Home Health, Home with family      [id number] - [facility] [id number] [place] [address] AT [place] [address] [address] [place] [address]  [id number] [place] [address]  Phone: [phone] Fax: [phone]      Initial Assessment (most recent)       Adult Discharge Assessment - 03/13/25 1015          Discharge Assessment    Assessment Type Discharge Planning Assessment     Confirmed/corrected address, phone number and insurance Yes     Source of Information patient     Does patient/caregiver understand observation status Yes     Communicated OLY with patient/caregiver Yes     People in Home child(lisa), adult     Do you expect to return to your current living situation? Yes     Do you have help at home or someone to help you manage your care at home? Yes     Prior to hospitilization cognitive status: Alert/Oriented     Current cognitive status: Alert/Oriented     Walking or Climbing Stairs Difficulty no     Dressing/Bathing Difficulty no     Home Accessibility wheelchair accessible;stairs to enter home     Number of Stairs, Main Entrance three     Home Layout Able to live on 1st floor     Equipment Currently Used at Home none     Readmission within 30 days? No     Patient currently being followed by outpatient case management? No     Do you currently have service(s) that help you manage  your care at home? No     Do you take prescription medications? Yes     Do you have prescription coverage? Yes     Do you have any problems affording any of your prescribed medications? No     Is the patient taking medications as prescribed? yes     Are you on dialysis? No     Do you take coumadin? No     Discharge Plan A Home with family     Discharge Plan B Home Health;Home with family     DME Needed Upon Discharge  none     Discharge Plan discussed with: Patient     Transition of Care Barriers None                         SW completed discharge planning assessment with the patient at bedside. SW verified demographic information listed on the pt.'s Face sheet. Chela reports living in a single story home with steps to enter home (no concerns to enter). Pt reports that his daugther plans to come home from college today and help manage his needs as able. Patient doesn't report utilizing any equipment prior to this hospital stay, nor reports any DME needs upon discharge at this time. SW is following this Pt for DC planning needs. There are no identified needs at this time.    Discharge Plan A and Plan B have been determined by review of patient's clinical status, future medical and therapeutic needs, and coverage/benefits for post-acute care in coordination with multidisciplinary team members.      Merle Ovalles LCSW  Case Management   [facility]   Ext. [phone]

## 2025-03-13 NOTE — PLAN OF CARE
Problem: Adult Inpatient Plan of Care  Goal: Plan of Care Review  Outcome: Progressing  Goal: Patient-Specific Goal (Individualized)  Outcome: Progressing  Goal: Absence of Hospital-Acquired Illness or Injury  Outcome: Progressing  Goal: Optimal Comfort and Wellbeing  Outcome: Progressing  Goal: Readiness for Transition of Care  Outcome: Progressing     Patient does not complain of pain through out night. Nephrostomy output recorded in flowsheet. Educated patient on care plan. Patient verbalized understanding. CHG wipes given. Bed locked and in lowest position. Safety measures maintained. Call light within reach.

## 2025-03-13 NOTE — ANESTHESIA PROCEDURE NOTES
Bilateral GHASSAN ss    Patient location during procedure: pre-op   Block not for primary anesthetic.  Reason for block: at surgeon's request and post-op pain management   Post-op Pain Location: Bilateral abdominal pain   Start time: 3/13/2025 12:30 PM  Timeout: 3/13/2025 12:25 PM   End time: 3/13/2025 12:45 PM    Staffing  Authorizing Provider: Gaudencio Barragan MD  Performing Provider: Laura Luz MD    Staffing  Performed by: Laura Luz MD  Authorized by: Gaudencio Barragan MD    Preanesthetic Checklist  Completed: patient identified, IV checked, site marked, risks and benefits discussed, surgical consent, monitors and equipment checked, pre-op evaluation and timeout performed  Peripheral Block  Patient position: sitting  Prep: ChloraPrep  Patient monitoring: heart rate, cardiac monitor, continuous pulse ox, continuous capnometry and frequent blood pressure checks  Block type: erector spinae plane  Laterality: bilateral  Injection technique: single shot  Interspace: T8-9    Needle  Needle type: Echogenic   Needle gauge: 20 G  Needle length: 4 in  Needle localization: anatomical landmarks and ultrasound guidance   -ultrasound image captured on disc.  Assessment  Injection assessment: negative aspiration, negative parasthesia and local visualized surrounding nerve  Paresthesia pain: none  Heart rate change: no  Slow fractionated injection: yes    Medications:    Medications: bupivacaine (pf) (MARCAINE) injection 0.75% - Perineural   30 mL - 3/13/2025 12:45:00 PM    Additional Notes  Patient tolerated well.  See [facility] RN record for vitals.

## 2025-03-13 NOTE — H&P
Urology [facility]    HPI:  Josh Ruth is a 60 year old male with a right ureteral stricture who is being admitted for IV antibiotics in preparation for upcoming procedure. The patient is set to undergo a robotic right ureteral stricture repair on 3/13/2025, he will receive cefepime immediately and another dose in the morning.    The patient reports that he is feeling well and denies a recent history of fevers, chills, nausea or vomiting. He states that the urine from his nephrostomy bag has cleared.     No blood thinners.     ROS:  Neg except per HPI    Past Medical History:   Diagnosis Date    Arthritis     Chronic low back pain     Hypertension     Kidney stones     Parathyroid disorder 08/2024    Renal disorder     hydronephrosis       Past Surgical History:   Procedure Laterality Date    ANTEGRADE NEPHROSTOGRAPHY Right 1/30/2025    Procedure: Nephrostogram - antegrade;  Surgeon: Mesfin Mariee MD;  Location: [facility] OR [address] [place] [address];  Service: Urology;  Laterality: Right;    CYSTOGRAM  1/30/2025    Procedure: CYSTOGRAM;  Surgeon: Mesfin Mariee MD;  Location: [facility] OR [address] [place] [address];  Service: Urology;;    CYSTOSCOPIC LITHOLAPAXY  7/3/2024    Procedure: CYSTOLITHOLAPAXY;  Surgeon: Paul Ruth MD;  Location: [facility] OR;  Service: Urology;;    CYSTOSCOPY N/A 1/30/2025    Procedure: CYSTOSCOPY;  Surgeon: Mesfin Mariee MD;  Location: [facility] OR [address] [place] [address];  Service: Urology;  Laterality: N/A;    CYSTOSCOPY W/ LASER LITHOTRIPSY      CYSTOSCOPY W/ RETROGRADES Right 1/30/2025    Procedure: CYSTOSCOPY, WITH RETROGRADE PYELOGRAM;  Surgeon: Mesfin Mariee MD;  Location: [facility] OR [address] [place] [address];  Service: Urology;  Laterality: Right;    CYSTOSCOPY W/ URETERAL STENT REMOVAL Right 8/7/2024    Procedure: CYSTOSCOPY, WITH URETERAL STENT REMOVAL;  Surgeon: Paul Ruth MD;  Location: [facility] OR;  Service: Urology;  Laterality: Right;    CYSTOSCOPY WITH CALCULUS EXTRACTION Right 7/3/2024    Procedure: CYSTOSCOPY, WITH CALCULUS  REMOVAL;  Surgeon: Paul Ruth MD;  Location: [facility] OR;  Service: Urology;  Laterality: Right;    CYSTOSCOPY WITH CALCULUS EXTRACTION Right 8/7/2024    Procedure: CYSTOSCOPY, WITH CALCULUS REMOVAL;  Surgeon: Paul Ruth MD;  Location: [facility] OR;  Service: Urology;  Laterality: Right;    CYSTOURETEROSCOPY WITH RETROGRADE PYELOGRAPHY AND INSERTION OF STENT INTO URETER Right 10/26/2023    Procedure: CYSTOURETEROSCOPY, WITH RETROGRADE PYELOGRAM AND URETERAL STENT INSERTION;  Surgeon: Paul Ruth MD;  Location: [facility] OR;  Service: Urology;  Laterality: Right;    CYSTOURETEROSCOPY WITH RETROGRADE PYELOGRAPHY AND INSERTION OF STENT INTO URETER Right 7/3/2024    Procedure: CYSTOURETEROSCOPY, WITH RETROGRADE PYELOGRAM AND URETERAL STENT INSERTION;  Surgeon: Paul Ruth MD;  Location: [facility] OR;  Service: Urology;  Laterality: Right;    CYSTOURETEROSCOPY WITH RETROGRADE PYELOGRAPHY AND INSERTION OF STENT INTO URETER Right 8/7/2024    Procedure: CYSTOURETEROSCOPY, WITH RETROGRADE PYELOGRAM AND URETERAL STENT INSERTION;  Surgeon: Paul Ruth MD;  Location: [facility] OR;  Service: Urology;  Laterality: Right;    LASER LITHOTRIPSY Right 10/26/2023    Procedure: LITHOTRIPSY, USING LASER-THULIUM;  Surgeon: Paul Ruth MD;  Location: [facility] OR;  Service: Urology;  Laterality: Right;    LASER LITHOTRIPSY Right 7/3/2024    Procedure: LITHOTRIPSY, USING LASER, Thulium;  Surgeon: Paul Ruth MD;  Location: [facility] OR;  Service: Urology;  Laterality: Right;    LASER LITHOTRIPSY Right 8/7/2024    Procedure: LITHOTRIPSY, USING LASER - Thulium;  Surgeon: Paul Ruth MD;  Location: [facility] OR;  Service: Urology;  Laterality: Right;    LUMBAR FUSION  12/2019       Social History     Socioeconomic History    Marital status:    Tobacco Use    Smoking status: Never     Passive exposure: Never    Smokeless tobacco: Never   Substance and Sexual Activity    Alcohol use: Not Currently    Drug use: Yes     Types: Marijuana     Comment: weekly  "    Social Drivers of Health     Financial Resource Strain: Medium Risk (2/19/2025)    Overall Financial Resource Strain (CARDIA)     Difficulty of Paying Living Expenses: Somewhat hard   Food Insecurity: No Food Insecurity (2/19/2025)    Hunger Vital Sign     Worried About Running Out of Food in the Last Year: Never true     Ran Out of Food in the Last Year: Never true   Transportation Needs: No Transportation Needs (2/19/2025)    PRAPARE - Transportation     Lack of Transportation (Medical): No     Lack of Transportation (Non-Medical): No   Physical Activity: Sufficiently Active (2/19/2025)    Exercise Vital Sign     Days of Exercise per Week: 5 days     Minutes of Exercise per Session: 150+ min   Stress: No Stress Concern Present (2/19/2025)    [employer] - Occupational Stress Questionnaire     Feeling of Stress : Not at all   Housing Stability: Low Risk  (2/19/2025)    Housing Stability Vital Sign     Unable to Pay for Housing in the Last Year: No     Number of Times Moved in the Last Year: 0     Homeless in the Last Year: No       Family History   Problem Relation Name Age of Onset    No Known Problems Mother      No Known Problems Father Kevin Ruth     Kidney cancer Brother         Review of patient's allergies indicates:   Allergen Reactions    Pcn [penicillins] Hives       Medications Ordered Prior to Encounter[1]    Anticoagulation:  No    Physical Exam:  Estimated body mass index is 20.2 kg/m² as calculated from the following:    Height as of 3/3/25: 5' 7" (1.702 m).    Weight as of 3/3/25: 58.5 kg (128 lb 15.5 oz).    General: No acute distress, well developed. AAOx3  Head: Normocephalic, Atraumatic  Eyes: Extra-occular movements intact, No discharge  Neck: supple, symmetrical, trachea midline  Lungs: normal respiratory effort, no respiratory distress, no wheezes  CV: regular rate, 2+ pulses  Abdomen: soft, non-tender, non-distended, no organomegaly. Right nephrostomy " tube bag draining clear yellow urine.   MSK: no edema, no deformities, normal ROM  Skin: skin color, texture, turgor normal.  Neurologic: no focal deficits, sensation intact    Labs:      Lab Results   Component Value Date    WBC 11.77 12/23/2024    HGB 14.6 12/23/2024    HCT 41.8 12/23/2024    MCV 89 12/23/2024     12/23/2024           BMP  Lab Results   Component Value Date     03/03/2025    K 3.8 03/03/2025     03/03/2025    CO2 28 03/03/2025    BUN 12 03/03/2025    CREATININE 0.7 03/03/2025    CALCIUM 10.4 03/03/2025    ANIONGAP 6 (L) 03/03/2025    EGFRNORACEVR >60.0 03/03/2025         Assessment: Josh Ruth is a 60 y.o. male with a right ureteral stricture. He is being admitted in preparation for upcoming procedure.     Plan:     - Admit to Urology  - Cefepime 1g Q12   - Regular diet   - Morning labs   - NPO at midnight     [place] MD        [1]   Current Facility-Administered Medications on File Prior to Encounter   Medication Dose Route Frequency Provider Last Rate Last Admin    lactated ringers infusion   Intravenous Continuous Rick Barron MD 20 mL/hr at 10/26/23 0834 New Bag at 10/26/23 0834     Current Outpatient Medications on File Prior to Encounter   Medication Sig Dispense Refill    cholecalciferol, vitamin D3, 1,250 mcg (50,000 unit) capsule Take 50,000 Units by mouth every 7 days.      gabapentin (NEURONTIN) 800 MG tablet Take 800 mg by mouth 3 (three) times daily as needed.      oxyCODONE-acetaminophen (PERCOCET)  mg per tablet Take 1 tablet by mouth every 4 (four) hours.      propranoloL (INDERAL) 10 MG tablet Take 10 mg by mouth 2 (two) times daily.      sulfamethoxazole-trimethoprim 800-160mg (BACTRIM DS) 800-160 mg Tab Take 1 tablet by mouth 2 (two) times daily. for 14 days 28 tablet 0    sulfamethoxazole-trimethoprim 800-160mg (BACTRIM DS) 800-160 mg Tab Take 1 tablet by mouth 2 (two) times daily. for 7 days 14 tablet 0

## 2025-03-14 ENCOUNTER — TELEPHONE (OUTPATIENT)
Dept: ENDOCRINOLOGY | Facility: CLINIC | Age: 60
End: 2025-03-14
Payer: COMMERCIAL

## 2025-03-14 ENCOUNTER — RESULTS FOLLOW-UP (OUTPATIENT)
Dept: ENDOCRINOLOGY | Facility: CLINIC | Age: 60
End: 2025-03-14

## 2025-03-14 ENCOUNTER — PATIENT MESSAGE (OUTPATIENT)
Dept: ENDOCRINOLOGY | Facility: CLINIC | Age: 60
End: 2025-03-14
Payer: COMMERCIAL

## 2025-03-14 LAB
ANION GAP SERPL CALC-SCNC: 8 MMOL/L (ref 8–16)
BASOPHILS # BLD AUTO: 0.02 K/UL (ref 0–0.2)
BASOPHILS NFR BLD: 0.1 % (ref 0–1.9)
BUN SERPL-MCNC: 15 MG/DL (ref 6–20)
CALCIUM SERPL-MCNC: 9.7 MG/DL (ref 8.7–10.5)
CHLORIDE SERPL-SCNC: 107 MMOL/L (ref 95–110)
CO2 SERPL-SCNC: 23 MMOL/L (ref 23–29)
CREAT SERPL-MCNC: 0.8 MG/DL (ref 0.5–1.4)
DIFFERENTIAL METHOD BLD: ABNORMAL
EOSINOPHIL # BLD AUTO: 0 K/UL (ref 0–0.5)
EOSINOPHIL NFR BLD: 0 % (ref 0–8)
ERYTHROCYTE [DISTWIDTH] IN BLOOD BY AUTOMATED COUNT: 12.5 % (ref 11.5–14.5)
EST. GFR  (NO RACE VARIABLE): >60 ML/MIN/1.73 M^2
GLUCOSE SERPL-MCNC: 146 MG/DL (ref 70–110)
HCT VFR BLD AUTO: 39 % (ref 40–54)
HGB BLD-MCNC: 13.4 G/DL (ref 14–18)
IMM GRANULOCYTES # BLD AUTO: 0.1 K/UL (ref 0–0.04)
IMM GRANULOCYTES NFR BLD AUTO: 0.5 % (ref 0–0.5)
LYMPHOCYTES # BLD AUTO: 1.2 K/UL (ref 1–4.8)
LYMPHOCYTES NFR BLD: 5.8 % (ref 18–48)
MAGNESIUM SERPL-MCNC: 1.5 MG/DL (ref 1.6–2.6)
MCH RBC QN AUTO: 31.4 PG (ref 27–31)
MCHC RBC AUTO-ENTMCNC: 34.4 G/DL (ref 32–36)
MCV RBC AUTO: 91 FL (ref 82–98)
MONOCYTES # BLD AUTO: 1.1 K/UL (ref 0.3–1)
MONOCYTES NFR BLD: 5.1 % (ref 4–15)
NEUTROPHILS # BLD AUTO: 18.7 K/UL (ref 1.8–7.7)
NEUTROPHILS NFR BLD: 88.5 % (ref 38–73)
NRBC BLD-RTO: 0 /100 WBC
PHOSPHATE SERPL-MCNC: 1.9 MG/DL (ref 2.7–4.5)
PLATELET # BLD AUTO: 181 K/UL (ref 150–450)
PMV BLD AUTO: 10.1 FL (ref 9.2–12.9)
POTASSIUM SERPL-SCNC: 4.2 MMOL/L (ref 3.5–5.1)
RBC # BLD AUTO: 4.27 M/UL (ref 4.6–6.2)
SODIUM SERPL-SCNC: 138 MMOL/L (ref 136–145)
WBC # BLD AUTO: 21.14 K/UL (ref 3.9–12.7)

## 2025-03-14 PROCEDURE — 83735 ASSAY OF MAGNESIUM: CPT | Performed by: STUDENT IN AN ORGANIZED HEALTH CARE EDUCATION/TRAINING PROGRAM

## 2025-03-14 PROCEDURE — 63600175 PHARM REV CODE 636 W HCPCS

## 2025-03-14 PROCEDURE — 85025 COMPLETE CBC W/AUTO DIFF WBC: CPT | Performed by: STUDENT IN AN ORGANIZED HEALTH CARE EDUCATION/TRAINING PROGRAM

## 2025-03-14 PROCEDURE — 36415 COLL VENOUS BLD VENIPUNCTURE: CPT | Performed by: STUDENT IN AN ORGANIZED HEALTH CARE EDUCATION/TRAINING PROGRAM

## 2025-03-14 PROCEDURE — 25000003 PHARM REV CODE 250

## 2025-03-14 PROCEDURE — 84100 ASSAY OF PHOSPHORUS: CPT | Performed by: STUDENT IN AN ORGANIZED HEALTH CARE EDUCATION/TRAINING PROGRAM

## 2025-03-14 PROCEDURE — 11000001 HC ACUTE MED/SURG PRIVATE ROOM

## 2025-03-14 PROCEDURE — 80048 BASIC METABOLIC PNL TOTAL CA: CPT | Performed by: STUDENT IN AN ORGANIZED HEALTH CARE EDUCATION/TRAINING PROGRAM

## 2025-03-14 RX ORDER — LIDOCAINE 50 MG/G
1 PATCH TOPICAL
Status: DISCONTINUED | OUTPATIENT
Start: 2025-03-14 | End: 2025-03-16 | Stop reason: HOSPADM

## 2025-03-14 RX ORDER — MAGNESIUM SULFATE HEPTAHYDRATE 40 MG/ML
2 INJECTION, SOLUTION INTRAVENOUS ONCE
Status: COMPLETED | OUTPATIENT
Start: 2025-03-14 | End: 2025-03-14

## 2025-03-14 RX ORDER — PROCHLORPERAZINE EDISYLATE 5 MG/ML
2.5 INJECTION INTRAMUSCULAR; INTRAVENOUS EVERY 6 HOURS PRN
Status: DISCONTINUED | OUTPATIENT
Start: 2025-03-14 | End: 2025-03-16 | Stop reason: HOSPADM

## 2025-03-14 RX ORDER — BUTALBITAL, ACETAMINOPHEN AND CAFFEINE 50; 325; 40 MG/1; MG/1; MG/1
1 TABLET ORAL EVERY 4 HOURS PRN
Status: CANCELLED | OUTPATIENT
Start: 2025-03-14

## 2025-03-14 RX ORDER — ONDANSETRON 8 MG/1
8 TABLET, ORALLY DISINTEGRATING ORAL EVERY 6 HOURS PRN
Status: DISCONTINUED | OUTPATIENT
Start: 2025-03-14 | End: 2025-03-16 | Stop reason: HOSPADM

## 2025-03-14 RX ORDER — BUTALBITAL, ACETAMINOPHEN AND CAFFEINE 50; 325; 40 MG/1; MG/1; MG/1
1 TABLET ORAL ONCE AS NEEDED
Status: COMPLETED | OUTPATIENT
Start: 2025-03-14 | End: 2025-03-14

## 2025-03-14 RX ORDER — PREGABALIN 50 MG/1
50 CAPSULE ORAL DAILY
Status: DISCONTINUED | OUTPATIENT
Start: 2025-03-14 | End: 2025-03-16 | Stop reason: HOSPADM

## 2025-03-14 RX ORDER — ACETAMINOPHEN 500 MG
1000 TABLET ORAL EVERY 8 HOURS
Status: DISCONTINUED | OUTPATIENT
Start: 2025-03-14 | End: 2025-03-16 | Stop reason: HOSPADM

## 2025-03-14 RX ORDER — SCOPOLAMINE 1 MG/3D
1 PATCH, EXTENDED RELEASE TRANSDERMAL
Status: DISCONTINUED | OUTPATIENT
Start: 2025-03-14 | End: 2025-03-16 | Stop reason: HOSPADM

## 2025-03-14 RX ORDER — CALCIUM CARBONATE 200(500)MG
500 TABLET,CHEWABLE ORAL DAILY PRN
Status: DISCONTINUED | OUTPATIENT
Start: 2025-03-14 | End: 2025-03-16 | Stop reason: HOSPADM

## 2025-03-14 RX ORDER — METHOCARBAMOL 500 MG/1
500 TABLET, FILM COATED ORAL 3 TIMES DAILY
Status: DISCONTINUED | OUTPATIENT
Start: 2025-03-14 | End: 2025-03-16 | Stop reason: HOSPADM

## 2025-03-14 RX ADMIN — OXYCODONE HYDROCHLORIDE 10 MG: 10 TABLET ORAL at 04:03

## 2025-03-14 RX ADMIN — LIDOCAINE 1 PATCH: 50 PATCH CUTANEOUS at 01:03

## 2025-03-14 RX ADMIN — ONDANSETRON 8 MG: 8 TABLET, ORALLY DISINTEGRATING ORAL at 08:03

## 2025-03-14 RX ADMIN — CEFEPIME 1 G: 1 INJECTION, POWDER, FOR SOLUTION INTRAMUSCULAR; INTRAVENOUS at 06:03

## 2025-03-14 RX ADMIN — BUTALBITAL, ACETAMINOPHEN, AND CAFFEINE 1 TABLET: 50; 325; 40 TABLET ORAL at 12:03

## 2025-03-14 RX ADMIN — OXYCODONE HYDROCHLORIDE 10 MG: 10 TABLET ORAL at 10:03

## 2025-03-14 RX ADMIN — MAGNESIUM SULFATE HEPTAHYDRATE 2 G: 40 INJECTION, SOLUTION INTRAVENOUS at 09:03

## 2025-03-14 RX ADMIN — PROCHLORPERAZINE EDISYLATE 2.5 MG: 5 INJECTION INTRAMUSCULAR; INTRAVENOUS at 12:03

## 2025-03-14 RX ADMIN — CALCIUM CARBONATE (ANTACID) CHEW TAB 500 MG 500 MG: 500 CHEW TAB at 10:03

## 2025-03-14 RX ADMIN — CEFEPIME 1 G: 1 INJECTION, POWDER, FOR SOLUTION INTRAMUSCULAR; INTRAVENOUS at 11:03

## 2025-03-14 RX ADMIN — METHOCARBAMOL 500 MG: 500 TABLET ORAL at 10:03

## 2025-03-14 RX ADMIN — SODIUM PHOSPHATE, MONOBASIC, MONOHYDRATE AND SODIUM PHOSPHATE, DIBASIC, ANHYDROUS 30 MMOL: 142; 276 INJECTION, SOLUTION INTRAVENOUS at 09:03

## 2025-03-14 RX ADMIN — SODIUM CHLORIDE: 9 INJECTION, SOLUTION INTRAVENOUS at 04:03

## 2025-03-14 RX ADMIN — ACETAMINOPHEN 1000 MG: 500 TABLET ORAL at 05:03

## 2025-03-14 RX ADMIN — SCOPOLAMINE 1 PATCH: 1.5 PATCH, EXTENDED RELEASE TRANSDERMAL at 06:03

## 2025-03-14 RX ADMIN — METHOCARBAMOL 500 MG: 500 TABLET ORAL at 09:03

## 2025-03-14 RX ADMIN — ACETAMINOPHEN 1000 MG: 500 TABLET ORAL at 02:03

## 2025-03-14 RX ADMIN — METHOCARBAMOL 500 MG: 500 TABLET ORAL at 02:03

## 2025-03-14 RX ADMIN — PREGABALIN 50 MG: 50 CAPSULE ORAL at 01:03

## 2025-03-14 RX ADMIN — OXYCODONE HYDROCHLORIDE 10 MG: 10 TABLET ORAL at 05:03

## 2025-03-14 RX ADMIN — CEFEPIME 1 G: 1 INJECTION, POWDER, FOR SOLUTION INTRAMUSCULAR; INTRAVENOUS at 01:03

## 2025-03-14 RX ADMIN — ACETAMINOPHEN 1000 MG: 500 TABLET ORAL at 09:03

## 2025-03-14 NOTE — NURSING
Patient ambulated in hallway with stand by assist. Patient complained of increased pain and trembling. Patient back in bed, all lines and drains intact. Bed locked and in lowest position call light within reach.

## 2025-03-14 NOTE — NURSING
"Patient arrived from PACU with 10/10 pain. Oxy 10mg given at 2043 while in PACU. Patient states "pain medication is not working, I need something else". On call urology paged. 0.5 mg hydromorphone q6 added.  "

## 2025-03-14 NOTE — NURSING
Patient continues to complain of nausea. Zofran given at 2207 but nausea has not subsided. Patient had 1x emesis of 100ml. Characteristics of emesis is brown and foamy. Patient continues to complain of 10/10 pain. Patient becoming agitated. On call provider paged. No new orders at this time.

## 2025-03-14 NOTE — TELEPHONE ENCOUNTER
Please advise patient that his thyroid biopsy came back benign. We will need to set up the parathyroid neck CT a few weeks after he has recovered from recent surgery.

## 2025-03-14 NOTE — PLAN OF CARE
Problem: Adult Inpatient Plan of Care  Goal: Patient-Specific Goal (Individualized)  Outcome: Progressing  Goal: Absence of Hospital-Acquired Illness or Injury  Outcome: Progressing  Goal: Optimal Comfort and Wellbeing  Outcome: Progressing  Goal: Readiness for Transition of Care  Outcome: Progressing     Problem: Wound  Goal: Optimal Coping  Outcome: Progressing  Goal: Optimal Functional Ability  Outcome: Progressing  Goal: Absence of Infection Signs and Symptoms  Outcome: Progressing  Goal: Improved Oral Intake  Outcome: Progressing

## 2025-03-14 NOTE — NURSING TRANSFER
Nursing Transfer Note      3/13/2025   9:28 PM    Nurse giving handoff:Lu RIVERA  Nurse receiving handoff:% th floor RN    Reason patient is being transferred: post op    Transfer : 524    Transfer via stretcher    Transfer with IVFs    Transported by PCT    Transfer Vital Signs:  Blood Pressure:see flowsheet  Heart Rate:  O2:RA  Temperature:  Respirations:    Telemetry:   Order for Tele Monitor?     Additional Lines:     Medicines sent: no    Any special needs or follow-up needed: no    Patient belongings transferred with patient: no    Chart send with patient: yes    Notified: father    Patient reassessed at: 03/13/25 @ 2107     Upon arrival to floor:

## 2025-03-14 NOTE — NURSING TRANSFER
Nursing Transfer Note      3/13/2025   9:31 PM    Nurse giving handoff:Lu RIVERA  Nurse receiving handoff:RN    Reason patient is being transferred: post op-    Transfer : 524    Transfer via stretcher    Transfer with IVFs    Transported by PCT    Transfer Vital Signs:  Blood Pressure:see flowsheet  Heart Rate:  O2:RA  Temperature:  Respirations:    Telemetry:   Order for Tele Monitor? no    Additional Lines:     Medicines sent: no    Any special needs or follow-up needed: no    Patient belongings transferred with patient: no    Chart send with patient: yes    Notified: Father    Patient reassessed at: 03/13/25 @ 2100   Upon arrival to floor:

## 2025-03-14 NOTE — ANESTHESIA POSTPROCEDURE EVALUATION
Anesthesia Post Evaluation    Patient: Josh Ruth    Procedure(s) Performed: Procedure(s) (LRB):  DV5 ROBOTIC URETEROURETEROSTOMY (Right)  CYSTOSCOPY, FLEXIBLE (N/A)  URETEROSCOPY (Right)  CYSTOSCOPY, WITH URETERAL STENT INSERTION (Right)  SURGICAL PROCUREMENT, GRAFT, BUCCAL MUCOSA (Left)  ROBOTIC URETEROLITHOTOMY (Right)  REMOVAL, NEPHROSTOMY TUBE, WITH IMAGING GUIDANCE (Right)    Final Anesthesia Type: general      Patient location during evaluation: PACU  Patient participation: Yes- Able to Participate  Level of consciousness: awake  Post-procedure vital signs: reviewed and stable  Pain management: adequate  Airway patency: patent    PONV status at discharge: No PONV  Anesthetic complications: no      Cardiovascular status: blood pressure returned to baseline  Respiratory status: unassisted  Hydration status: euvolemic  Follow-up not needed.              Vitals Value Taken Time   /79 03/13/25 21:00   Temp 36.7 °C (98 °F) 03/13/25 21:00   Pulse 85 03/13/25 21:00   Resp 18 03/13/25 21:00   SpO2 100 % 03/13/25 21:00         No case tracking events are documented in the log.      Pain/Teri Score: Pain Rating Prior to Med Admin: 5 (3/13/2025  8:43 PM)  Pain Rating Post Med Admin: 2 (3/13/2025  9:00 PM)  Teri Score: 9 (3/13/2025  7:30 PM)

## 2025-03-14 NOTE — PROGRESS NOTES
[facility]  Urology  Progress Note    Patient Name: Josh Ruth  MRN: [id number]  Admission Date: 3/12/2025  Hospital Length of Stay: 2 days  Code Status: Full Code   Attending Provider: Mesfin Mariee MD   Primary Care Physician: Rolando Rangel MD    Subjective:     HPI:  Mr Josh Ruth is a 60 year old male with a history of right ureteral stricture.    Interval History: S/p ureteroureterostomy and buccal mucosal graft POD 1. Patient complains of nausea and emesis overnight. Reports pain in right upper quadrant. Denies pain in the mouth. Did not eat or ambulate last night. No fevers or chills.    Review of Systems see interval history above  Objective:     Temp:  [97.5 °F (36.4 °C)-98.4 °F (36.9 °C)] 98.3 °F (36.8 °C)  Pulse:  [51-89] 64  Resp:  [12-25] 15  SpO2:  [95 %-100 %] 96 %  BP: (109-172)/(65-90) 160/72     Body mass index is 20.37 kg/m².           Drains       Drain  Duration                  Closed/Suction Drain 03/13/25 1858 Tube - 1 RLQ Bulb 15 Fr. <1 day         Ureteral Drain/Stent 03/13/25 1817 Right ureter 6 Fr. <1 day         Urethral Catheter 03/13/25 1814 Straight-tip;Non-latex 16 Fr. <1 day                     Physical Exam  Vitals reviewed.   Constitutional:       General: He is awake. He is not in acute distress.  HENT:      Head: Normocephalic and atraumatic.   Cardiovascular:      Rate and Rhythm: Normal rate.   Pulmonary:      Effort: Pulmonary effort is normal. No respiratory distress.   Abdominal:      General: There is no distension. Right nephrostomy tube removed in OR yesterday. Incision site healing appropriately.     Palpations: Abdomen is soft.      Comments: Appropriately tender to palpation. Abdominal incisions covered with dermabond. C/D/I. ZORAIDA drain with appropriate thin serosanguinous output.   Genitourinary:     Comments: Talavera draining clear yellow urine. Removed on rounds.  Skin:     General: Skin is warm and dry.   Neurological:      General: No  focal deficit present.      Mental Status: He is alert and oriented to person, place, and time.           Significant Labs:    BMP:  Recent Labs   Lab 03/12/25 2042 03/13/25  0515 03/14/25  0601    139 138   K 3.9 4.2 4.2    108 107   CO2 23 22* 23   BUN 20 17 15   CREATININE 1.1 0.9 0.8   CALCIUM 11.1* 10.5 9.7       CBC:   Recent Labs   Lab 03/12/25 2042 03/13/25  0515 03/14/25  0601   WBC 13.30* 12.25 21.14*   HGB 14.1 14.6 13.4*   HCT 40.8 42.8 39.0*    193 181       All pertinent labs results from the past 24 hours have been reviewed.    Significant Imaging:  All pertinent imaging results/findings from the past 24 hours have been reviewed.    Assessment/Plan:     * Hydronephrosis due to obstruction of ureter  Josh Ruth is a 60 y.o. male with a right ureteral stricture. S/p right robotic ureteroureterostomy with buccal mucosal graft on 3/13.    - Patient seen and examined  - Progressing appropriately for post-operative period  - Strict I's/O's  - Drains: ZORAIDA drain with appropriate serosanguinous output  - ZORAIDA drain care and management  - Talavera removed on rounds, void trial  - Nausea control  - Regular diet  - mIVF  - Abx: Continue cefepime 1g Q12   - Home meds reviewed and reconciled  - MMPC  - PPx: SCD's/ELBERT's/IS    Dispo: floor, if stable may be appropriate to d/c later today         VTE Risk Mitigation (From admission, onward)           Ordered     IP VTE LOW RISK PATIENT  Once         03/13/25 0958     Place sequential compression device  Until discontinued         03/13/25 0958     Place ELBERT hose  Until discontinued         03/13/25 0958     Place sequential compression device  Until discontinued         03/13/25 0958     Place sequential compression device  Until discontinued         03/12/25 1952                    Ysabel Samuels,   Urology  [facility]

## 2025-03-14 NOTE — OP NOTE
DATE OF PROCEDURE: 3/13/2025    PREOPERATIVE DIAGNOSIS:   Right ureteral stricture  Indwelling Nephrostomy Tube    POSTOPERATIVE DIAGNOSIS:  Right ureteral stricture  Indwelling nephrostomy tube  Right ureteral stone    PROCEDURES PERFORMED:  1. Full thickness graft, free, including direct closure of donor site, forehead, cheeks, chin, mouth,neck, axillae, genitalia, hands, and/or feet; 20 sq cm or less: (04071)  2. Xenograft placement in the mouth, not exceeding 25cm2: (52245)    SURGEON:  René Valle MD (Primary)  Mesfin Mariee MD (Co-Surgeon), required due to lack of qualified resident to assist.     ASSISTANT:   AKHIL Dinero MD ()  Jeovanny Rousseau MD (Resident - Assisting)    ANESTHESIA: General endotracheal anesthesia    ESTIMATED BLOOD LOSS: 50 mL    SPECIMENS: Right ureteral stricture with clip    DRAINS:   16 Fr Talavera, 10 cc in balloon  RLQ 15 Fr Sage Drain  6 Fr x 26 cm JJ right ureteral stent without strings    COMPLICATIONS: None    FINDINGS:  1. Approximately 4.5 cm x 2.5 cm graft from left buccal mucosa  2. Standard implantation of Kerecis Omega3 Surgibind xenograft    INDICATIONS FOR PROCEDURE: Josh Ruth is a 60 y.o. male with a history of right ureteral stricture who presents today for buccal mucosa harvest for anticipated ureteroplasty.    PROCEDURE IN DETAIL: After risks benefits and possible complications of the procedure were explained, the patient elected to undergo the procedure and consent was obtained. All questions were answered in the pre-operative area. The patient was transferred to the operating room and placed in left lateral decubitus position on the operative table. SCDs were applied and working. Anesthesia was administered and the patient was prepped and draped in the usual sterile fashion. Time out was performed, and ladonna-procedural antibiotics were confirmed.    Please see the separate operative note for the portions of the surgery  that Dr. Mariee was the primary surgeon.    A 4.5 cm x 2.5 cm segment of buccal mucosa was harvested from the patient's left cheek by first hydrodissecting with a 1:1 mixture of 1% lidocaine with epinephrine (1:100,000). The appropriately sized graft was then demarcated and sharply removed. Care was taken to avoid injuring Fatimah's duct. The graft was defatted, rinsed in antibiotic solution, and passed to the sterile table. Once hemostasis of the graft harvest site was assured, a kerecis graft of 4.5 x 3 cm was then secured with 3-0 chromic suture to cover the graft harvest site. The mouth was packed with gauze soaked in topical anesthetic with epinephrine.    Two sponges were placed in the mouth for hemostasis which were removed at the conclusion of the case and hemostasis was again assured.    Sterile dressings were applied. All sponge, instrument, and needle counts were reported as correct at the end of the procedure. The patient was aroused from anesthesia and the endotracheal tube was removed in the operating room.    Dr. Valle was present and scrubbed for the entire procedure.    Dispo: The patient will be admitted for observation under the urology team overnight. A KUB will be obtained in PACU to assess for right ureteral stent position.    Jeovanny Rousseau MD

## 2025-03-14 NOTE — TRANSFER OF CARE
"Anesthesia Transfer of Care Note    Patient: Josh Ruth    Procedure(s) Performed: Procedure(s) (LRB):  DV5 ROBOTIC URETEROURETEROSTOMY (Right)  CYSTOSCOPY, FLEXIBLE (N/A)  URETEROSCOPY (Right)  CYSTOSCOPY, WITH URETERAL STENT INSERTION (Right)  SURGICAL PROCUREMENT, GRAFT, BUCCAL MUCOSA (Left)  ROBOTIC URETEROLITHOTOMY (Right)  REMOVAL, NEPHROSTOMY TUBE, WITH IMAGING GUIDANCE (Right)    Patient location: PACU    Anesthesia Type: general    Transport from OR: Transported from OR on 6-10 L/min O2 by face mask with adequate spontaneous ventilation    Post pain: adequate analgesia    Post assessment: no apparent anesthetic complications    Post vital signs: stable    Level of consciousness: awake    Nausea/Vomiting: no nausea/vomiting    Complications: none    Transfer of care protocol was followed    Last vitals: Visit Vitals  /67 (BP Location: Left arm, Patient Position: Lying)   Pulse (!) 55   Temp 36.9 °C (98.4 °F) (Temporal)   Resp 19   Ht 5' 7" (1.702 m)   Wt 59 kg (130 lb 1.1 oz)   SpO2 100%   BMI 20.37 kg/m²     "

## 2025-03-14 NOTE — SUBJECTIVE & OBJECTIVE
Interval History: S/p ureteroureterostomy and buccal mucosal graft POD 1. Patient complains of nausea and emesis overnight. Reports pain in right upper quadrant. Denies pain in the mouth. Did not eat or ambulate last night. No fevers or chills.    Review of Systems see interval history above  Objective:     Temp:  [97.5 °F (36.4 °C)-98.4 °F (36.9 °C)] 98.3 °F (36.8 °C)  Pulse:  [51-89] 64  Resp:  [12-25] 15  SpO2:  [95 %-100 %] 96 %  BP: (109-172)/(65-90) 160/72     Body mass index is 20.37 kg/m².           Drains       Drain  Duration                  Closed/Suction Drain 03/13/25 1858 Tube - 1 RLQ Bulb 15 Fr. <1 day         Ureteral Drain/Stent 03/13/25 1817 Right ureter 6 Fr. <1 day         Urethral Catheter 03/13/25 1814 Straight-tip;Non-latex 16 Fr. <1 day                     Physical Exam  Vitals reviewed.   Constitutional:       General: He is awake. He is not in acute distress.  HENT:      Head: Normocephalic and atraumatic.   Cardiovascular:      Rate and Rhythm: Normal rate.   Pulmonary:      Effort: Pulmonary effort is normal. No respiratory distress.   Abdominal:      General: There is no distension.      Palpations: Abdomen is soft.      Tenderness: There is no abdominal tenderness.      Comments: Appropriately tender to palpation. Abdominal incisions covered with dermabond. C/D/I. ZORAIDA drain with appropriate thin serosanguinous output.   Genitourinary:     Comments: Talavera draining clear yellow urine. Removed on rounds.  Skin:     General: Skin is warm and dry.   Neurological:      General: No focal deficit present.      Mental Status: He is alert and oriented to person, place, and time.           Significant Labs:    BMP:  Recent Labs   Lab 03/12/25 2042 03/13/25  0515 03/14/25  0601    139 138   K 3.9 4.2 4.2    108 107   CO2 23 22* 23   BUN 20 17 15   CREATININE 1.1 0.9 0.8   CALCIUM 11.1* 10.5 9.7       CBC:   Recent Labs   Lab 03/12/25 2042 03/13/25  0515 03/14/25  0601   WBC 13.30*  12.25 21.14*   HGB 14.1 14.6 13.4*   HCT 40.8 42.8 39.0*    193 181       All pertinent labs results from the past 24 hours have been reviewed.    Significant Imaging:  All pertinent imaging results/findings from the past 24 hours have been reviewed.

## 2025-03-14 NOTE — ASSESSMENT & PLAN NOTE
Josh Ruth is a 60 y.o. male with a right ureteral stricture. S/p right robotic ureteroureterostomy with buccal mucosal graft on 3/13.    - Patient seen and examined  - Progressing appropriately for post-operative period  - Strict I's/O's  - Drains: ZORAIDA drain with appropriate serosanguinous output  - ZORAIDA drain care and management  - Talavera removed on rounds, void trial  - Nausea control  - Regular diet  - mIVF  - Abx: Continue cefepime 1g Q12   - Home meds reviewed and reconciled  [facility]  - PPx: SCD's/ELBERT's/IS    Dispo: floor, if stable may be appropriate to d/c later today

## 2025-03-15 LAB
ANION GAP SERPL CALC-SCNC: 8 MMOL/L (ref 8–16)
BASOPHILS # BLD AUTO: 0.04 K/UL (ref 0–0.2)
BASOPHILS NFR BLD: 0.2 % (ref 0–1.9)
BUN SERPL-MCNC: 14 MG/DL (ref 6–20)
CALCIUM SERPL-MCNC: 9.8 MG/DL (ref 8.7–10.5)
CHLORIDE SERPL-SCNC: 105 MMOL/L (ref 95–110)
CO2 SERPL-SCNC: 23 MMOL/L (ref 23–29)
CREAT SERPL-MCNC: 0.7 MG/DL (ref 0.5–1.4)
DIFFERENTIAL METHOD BLD: ABNORMAL
EOSINOPHIL # BLD AUTO: 0 K/UL (ref 0–0.5)
EOSINOPHIL NFR BLD: 0 % (ref 0–8)
ERYTHROCYTE [DISTWIDTH] IN BLOOD BY AUTOMATED COUNT: 12.5 % (ref 11.5–14.5)
EST. GFR  (NO RACE VARIABLE): >60 ML/MIN/1.73 M^2
GLUCOSE SERPL-MCNC: 124 MG/DL (ref 70–110)
HCT VFR BLD AUTO: 37.9 % (ref 40–54)
HGB BLD-MCNC: 13.1 G/DL (ref 14–18)
IMM GRANULOCYTES # BLD AUTO: 0.14 K/UL (ref 0–0.04)
IMM GRANULOCYTES NFR BLD AUTO: 0.5 % (ref 0–0.5)
LYMPHOCYTES # BLD AUTO: 1.3 K/UL (ref 1–4.8)
LYMPHOCYTES NFR BLD: 5 % (ref 18–48)
MAGNESIUM SERPL-MCNC: 1.7 MG/DL (ref 1.6–2.6)
MCH RBC QN AUTO: 31.3 PG (ref 27–31)
MCHC RBC AUTO-ENTMCNC: 34.6 G/DL (ref 32–36)
MCV RBC AUTO: 91 FL (ref 82–98)
MONOCYTES # BLD AUTO: 1.5 K/UL (ref 0.3–1)
MONOCYTES NFR BLD: 5.7 % (ref 4–15)
NEUTROPHILS # BLD AUTO: 23.4 K/UL (ref 1.8–7.7)
NEUTROPHILS NFR BLD: 88.6 % (ref 38–73)
NRBC BLD-RTO: 0 /100 WBC
PHOSPHATE SERPL-MCNC: 1.8 MG/DL (ref 2.7–4.5)
PLATELET # BLD AUTO: 175 K/UL (ref 150–450)
PLATELET BLD QL SMEAR: ABNORMAL
PMV BLD AUTO: 10.3 FL (ref 9.2–12.9)
POTASSIUM SERPL-SCNC: 3.4 MMOL/L (ref 3.5–5.1)
RBC # BLD AUTO: 4.18 M/UL (ref 4.6–6.2)
SODIUM SERPL-SCNC: 136 MMOL/L (ref 136–145)
WBC # BLD AUTO: 26.39 K/UL (ref 3.9–12.7)

## 2025-03-15 PROCEDURE — 25000003 PHARM REV CODE 250

## 2025-03-15 PROCEDURE — 99231 SBSQ HOSP IP/OBS SF/LOW 25: CPT | Mod: ,,, | Performed by: UROLOGY

## 2025-03-15 PROCEDURE — 63600175 PHARM REV CODE 636 W HCPCS

## 2025-03-15 PROCEDURE — 85025 COMPLETE CBC W/AUTO DIFF WBC: CPT | Performed by: STUDENT IN AN ORGANIZED HEALTH CARE EDUCATION/TRAINING PROGRAM

## 2025-03-15 PROCEDURE — 80048 BASIC METABOLIC PNL TOTAL CA: CPT | Performed by: STUDENT IN AN ORGANIZED HEALTH CARE EDUCATION/TRAINING PROGRAM

## 2025-03-15 PROCEDURE — 84100 ASSAY OF PHOSPHORUS: CPT | Performed by: STUDENT IN AN ORGANIZED HEALTH CARE EDUCATION/TRAINING PROGRAM

## 2025-03-15 PROCEDURE — 36415 COLL VENOUS BLD VENIPUNCTURE: CPT | Performed by: STUDENT IN AN ORGANIZED HEALTH CARE EDUCATION/TRAINING PROGRAM

## 2025-03-15 PROCEDURE — 83735 ASSAY OF MAGNESIUM: CPT | Performed by: STUDENT IN AN ORGANIZED HEALTH CARE EDUCATION/TRAINING PROGRAM

## 2025-03-15 PROCEDURE — 63600175 PHARM REV CODE 636 W HCPCS: Performed by: UROLOGY

## 2025-03-15 PROCEDURE — 11000001 HC ACUTE MED/SURG PRIVATE ROOM

## 2025-03-15 RX ORDER — BUTALBITAL, ACETAMINOPHEN AND CAFFEINE 50; 325; 40 MG/1; MG/1; MG/1
1 TABLET ORAL 2 TIMES DAILY PRN
Status: DISCONTINUED | OUTPATIENT
Start: 2025-03-15 | End: 2025-03-16 | Stop reason: HOSPADM

## 2025-03-15 RX ORDER — CEFEPIME HYDROCHLORIDE 1 G/1
1 INJECTION, POWDER, FOR SOLUTION INTRAMUSCULAR; INTRAVENOUS
Status: DISCONTINUED | OUTPATIENT
Start: 2025-03-15 | End: 2025-03-16 | Stop reason: HOSPADM

## 2025-03-15 RX ORDER — LANOLIN ALCOHOL/MO/W.PET/CERES
400 CREAM (GRAM) TOPICAL ONCE
Status: COMPLETED | OUTPATIENT
Start: 2025-03-15 | End: 2025-03-15

## 2025-03-15 RX ADMIN — OXYCODONE 5 MG: 5 TABLET ORAL at 09:03

## 2025-03-15 RX ADMIN — BUTALBITAL, ACETAMINOPHEN, AND CAFFEINE 1 TABLET: 50; 325; 40 TABLET ORAL at 05:03

## 2025-03-15 RX ADMIN — CALCIUM CARBONATE (ANTACID) CHEW TAB 500 MG 500 MG: 500 CHEW TAB at 06:03

## 2025-03-15 RX ADMIN — METHOCARBAMOL 500 MG: 500 TABLET ORAL at 08:03

## 2025-03-15 RX ADMIN — CEFEPIME 1 G: 1 INJECTION, POWDER, FOR SOLUTION INTRAMUSCULAR; INTRAVENOUS at 08:03

## 2025-03-15 RX ADMIN — ACETAMINOPHEN 1000 MG: 500 TABLET ORAL at 08:03

## 2025-03-15 RX ADMIN — ONDANSETRON 8 MG: 8 TABLET, ORALLY DISINTEGRATING ORAL at 01:03

## 2025-03-15 RX ADMIN — OXYCODONE HYDROCHLORIDE 10 MG: 10 TABLET ORAL at 05:03

## 2025-03-15 RX ADMIN — METHOCARBAMOL 500 MG: 500 TABLET ORAL at 09:03

## 2025-03-15 RX ADMIN — SODIUM CHLORIDE: 9 INJECTION, SOLUTION INTRAVENOUS at 01:03

## 2025-03-15 RX ADMIN — ONDANSETRON 8 MG: 8 TABLET, ORALLY DISINTEGRATING ORAL at 12:03

## 2025-03-15 RX ADMIN — CEFEPIME 1 G: 1 INJECTION, POWDER, FOR SOLUTION INTRAMUSCULAR; INTRAVENOUS at 09:03

## 2025-03-15 RX ADMIN — METHOCARBAMOL 500 MG: 500 TABLET ORAL at 02:03

## 2025-03-15 RX ADMIN — PROCHLORPERAZINE EDISYLATE 2.5 MG: 5 INJECTION INTRAMUSCULAR; INTRAVENOUS at 06:03

## 2025-03-15 RX ADMIN — ACETAMINOPHEN 1000 MG: 500 TABLET ORAL at 02:03

## 2025-03-15 RX ADMIN — PREGABALIN 50 MG: 50 CAPSULE ORAL at 09:03

## 2025-03-15 RX ADMIN — LIDOCAINE 1 PATCH: 50 PATCH CUTANEOUS at 01:03

## 2025-03-15 RX ADMIN — CEFEPIME 1 G: 1 INJECTION, POWDER, FOR SOLUTION INTRAMUSCULAR; INTRAVENOUS at 01:03

## 2025-03-15 RX ADMIN — ACETAMINOPHEN 1000 MG: 500 TABLET ORAL at 05:03

## 2025-03-15 RX ADMIN — Medication 400 MG: at 12:03

## 2025-03-15 RX ADMIN — POTASSIUM PHOSPHATE, MONOBASIC AND POTASSIUM PHOSPHATE, DIBASIC 30 MMOL: 224; 236 INJECTION, SOLUTION, CONCENTRATE INTRAVENOUS at 12:03

## 2025-03-15 NOTE — NURSING
ZORAIDA dressing site extremely saturated with serosanguineous fluid. On call provider paged. Dressing changed per MD order. Patient tolerated well. Patient denies increased pain or discomfort.

## 2025-03-15 NOTE — ASSESSMENT & PLAN NOTE
Josh Ruth is a 60 y.o. male with a right ureteral stricture. S/p right robotic ureteroureterostomy with buccal mucosal graft on 3/13.    - Patient seen and examined  - Progressing appropriately for post-operative period  - Strict I's/O's  - Drains: ZORAIDA drain with appropriate serosanguinous output  - ZORAIDA Cr this am  - Nausea control. Continue scopolamine   - Regular diet  - mIVF  - Abx: Continue cefepime 1g Q12   - Home meds reviewed and reconciled  - [facility]  - PPx: SCD's/ELBERT's/IS    Dispo: floor, if stable may be appropriate to d/c later today

## 2025-03-15 NOTE — PROGRESS NOTES
[facility]  Urology  Progress Note    Patient Name: Josh Ruth  MRN: [id number]  Admission Date: 3/12/2025  Hospital Length of Stay: 3 days  Code Status: Full Code   Attending Provider: Mesfin Mariee MD   Primary Care Physician: Rolando Rangel MD    Subjective:     HPI:  Mr Josh Ruth is a 60 year old male with a history of right ureteral stricture.    Interval History: 2 episodes of emesis overnight despite addition of scopolamine patch. Still nauseas this morning. Slight improvement. Starting to pass flatus. Minimal oral intake. Ambulate in the hallways    Review of Systems  Objective:     Temp:  [97.8 °F (36.6 °C)-99.1 °F (37.3 °C)] 98.2 °F (36.8 °C)  Pulse:  [55-78] 75  Resp:  [14-18] 18  SpO2:  [92 %-96 %] 92 %  BP: (137-171)/(67-84) 154/84     Body mass index is 20.37 kg/m².           Drains       Drain  Duration                  Closed/Suction Drain 03/13/25 1858 Tube - 1 RLQ Bulb 15 Fr. 1 day         Ureteral Drain/Stent 03/13/25 1817 Right ureter 6 Fr. 1 day                     Physical Exam  Vitals reviewed.   Constitutional:       General: He is awake. He is not in acute distress.  HENT:      Head: Normocephalic and atraumatic.   Cardiovascular:      Rate and Rhythm: Normal rate.   Pulmonary:      Effort: Pulmonary effort is normal. No respiratory distress.   Abdominal:      General: There is no distension.      Palpations: Abdomen is soft.      Tenderness: There is no abdominal tenderness.      Comments: Appropriately tender to palpation. Abdominal incisions covered with dermabond. C/D/I. ZORAIDA drain with appropriate thin serosanguinous output.   Genitourinary:     Comments: Voiding clear yellow urine  Skin:     General: Skin is warm and dry.   Neurological:      General: No focal deficit present.      Mental Status: He is alert and oriented to person, place, and time.           Significant Labs:    BMP:  Recent Labs   Lab 03/13/25  0515 03/14/25  0601 03/15/25  0418     138 136   K 4.2 4.2 3.4*    107 105   CO2 22* 23 23   BUN 17 15 14   CREATININE 0.9 0.8 0.7   CALCIUM 10.5 9.7 9.8       CBC:   Recent Labs   Lab 03/13/25  0515 03/14/25  0601 03/15/25  0418   WBC 12.25 21.14* 26.39*   HGB 14.6 13.4* 13.1*   HCT 42.8 39.0* 37.9*    181 175       All pertinent labs results from the past 24 hours have been reviewed.    Significant Imaging:  All pertinent imaging results/findings from the past 24 hours have been reviewed.                  Assessment/Plan:     * Hydronephrosis due to obstruction of ureter  Josh Ruth is a 60 y.o. male with a right ureteral stricture. S/p right robotic ureteroureterostomy with buccal mucosal graft on 3/13.    - Patient seen and examined  - Progressing appropriately for post-operative period  - Strict I's/O's  - Drains: ZORAIDA drain with appropriate serosanguinous output  - ZORAIDA management. No ZORAIDA Cr this morning  - Nausea control. Continue scopolamine   - Regular diet  - mIVF  - Abx: Continue cefepime 1g Q12   - Home meds reviewed and reconciled  - [facility]  - PPx: SCD's/ELBERT's/IS  - If continues to have emesis today, KUB this afternoon    Dispo: floor        VTE Risk Mitigation (From admission, onward)           Ordered     IP VTE LOW RISK PATIENT  Once         03/13/25 0958     Place sequential compression device  Until discontinued         03/13/25 0958     Place ELBERT hose  Until discontinued         03/13/25 0958     Place sequential compression device  Until discontinued         03/13/25 0958     Place sequential compression device  Until discontinued         03/12/25 1952                    Jostin Hemphill MD  Urology  [facility] - Surgery    Patient was seen at the bedside.  He states the nausea is better as is the pain.  Encouraged to walk.  Plan the residents to re evaluate at noon to see if he needs a KUB.

## 2025-03-15 NOTE — PLAN OF CARE
Problem: Adult Inpatient Plan of Care  Goal: Plan of Care Review  Outcome: Progressing  Goal: Patient-Specific Goal (Individualized)  Outcome: Progressing  Goal: Absence of Hospital-Acquired Illness or Injury  Outcome: Progressing  Goal: Optimal Comfort and Wellbeing  Outcome: Progressing  Goal: Readiness for Transition of Care  Outcome: Progressing     Problem: Wound  Goal: Optimal Coping  Outcome: Progressing  Goal: Optimal Functional Ability  Outcome: Progressing     Patient had 1x emesis of 250ml over night. PRN antiemetics given. ZORAIDA drain dressing changed. Educated patient on need to ambulate but patient refusing due to increase pain upon movement. MD on call made aware. Educated patient to hold off on PO fluids if nauseous to prevent vomiting. Patient verbalized understanding. Surgical incisions are clean dry and intact. Drain output recorded in flowsheet. Bed locked and in lowest position. Call light within reach.

## 2025-03-15 NOTE — SUBJECTIVE & OBJECTIVE
Interval History: 2 episodes of emesis overnight despite addition of scopolamine patch. Still nauseas this morning. Slight improvement. Starting to pass flatus. Minimal oral intake. Ambulate in the hallways    Review of Systems  Objective:     Temp:  [97.8 °F (36.6 °C)-99.1 °F (37.3 °C)] 98.2 °F (36.8 °C)  Pulse:  [55-78] 75  Resp:  [14-18] 18  SpO2:  [92 %-96 %] 92 %  BP: (137-171)/(67-84) 154/84     Body mass index is 20.37 kg/m².           Drains       Drain  Duration                  Closed/Suction Drain 03/13/25 1858 Tube - 1 RLQ Bulb 15 Fr. 1 day         Ureteral Drain/Stent 03/13/25 1817 Right ureter 6 Fr. 1 day                     Physical Exam  Vitals reviewed.   Constitutional:       General: He is awake. He is not in acute distress.  HENT:      Head: Normocephalic and atraumatic.   Cardiovascular:      Rate and Rhythm: Normal rate.   Pulmonary:      Effort: Pulmonary effort is normal. No respiratory distress.   Abdominal:      General: There is no distension.      Palpations: Abdomen is soft.      Tenderness: There is no abdominal tenderness.      Comments: Appropriately tender to palpation. Abdominal incisions covered with dermabond. C/D/I. ZORAIDA drain with appropriate thin serosanguinous output.   Genitourinary:     Comments: Voiding clear yellow urine  Skin:     General: Skin is warm and dry.   Neurological:      General: No focal deficit present.      Mental Status: He is alert and oriented to person, place, and time.           Significant Labs:    BMP:  Recent Labs   Lab 03/13/25  0515 03/14/25  0601 03/15/25  0418    138 136   K 4.2 4.2 3.4*    107 105   CO2 22* 23 23   BUN 17 15 14   CREATININE 0.9 0.8 0.7   CALCIUM 10.5 9.7 9.8       CBC:   Recent Labs   Lab 03/13/25  0515 03/14/25  0601 03/15/25  0418   WBC 12.25 21.14* 26.39*   HGB 14.6 13.4* 13.1*   HCT 42.8 39.0* 37.9*    181 175       All pertinent labs results from the past 24 hours have been reviewed.    Significant  Imaging:  All pertinent imaging results/findings from the past 24 hours have been reviewed.

## 2025-03-15 NOTE — PROGRESS NOTES
Pharmacist Renal Dose Adjustment Note    Josh Ruth is a 60 y.o. male being treated with the medication cefepime    Patient Data:    Vital Signs (Most Recent):  Temp: 98.5 °F (36.9 °C) (03/15/25 1546)  Pulse: 61 (03/15/25 1546)  Resp: 20 (03/15/25 1546)  BP: (!) 166/80 (03/15/25 1546)  SpO2: (!) 94 % (03/15/25 1546) Vital Signs (72h Range):  Temp:  [97.5 °F (36.4 °C)-99.1 °F (37.3 °C)]   Pulse:  [51-89]   Resp:  [12-25]   BP: (109-172)/(65-90)   SpO2:  [92 %-100 %]      Recent Labs   Lab 03/13/25  0515 03/14/25  0601 03/15/25  0418   CREATININE 0.9 0.8 0.7     Serum creatinine: 0.7 mg/dL 03/15/25 0418  Estimated creatinine clearance: 94.8 mL/min    Medication:1 g IV Q8H will be changed to 1g Q12H    Pharmacist's Name: Dunia Hernández, PharmD  Clinical Pharmacy Specialist, Bone Marrow Transplant/Hematology  Spectra link: [id number]

## 2025-03-15 NOTE — PLAN OF CARE
Problem: Adult Inpatient Plan of Care  Goal: Plan of Care Review  Outcome: Progressing  Goal: Patient-Specific Goal (Individualized)  Outcome: Progressing  Goal: Absence of Hospital-Acquired Illness or Injury  Outcome: Progressing  Goal: Optimal Comfort and Wellbeing  Outcome: Progressing  Goal: Readiness for Transition of Care  Outcome: Progressing     Problem: Infection  Goal: Absence of Infection Signs and Symptoms  Outcome: Progressing     Problem: Wound  Goal: Optimal Coping  Outcome: Progressing  Goal: Optimal Functional Ability  Outcome: Progressing  Goal: Absence of Infection Signs and Symptoms  Outcome: Progressing  Goal: Improved Oral Intake  Outcome: Progressing  Goal: Optimal Pain Control and Function  Outcome: Progressing  Goal: Skin Health and Integrity  Outcome: Progressing  Goal: Optimal Wound Healing  Outcome: Progressing     Problem: Fall Injury Risk  Goal: Absence of Fall and Fall-Related Injury  Outcome: Progressing   Patient is resting in bed, denies excess pain or discomfort. Occasional nausea noted, no vomiting. RLQ ZORAIDA to bulb suction. Patient is tolerating liquids, ambulating and voiding. POC discussed at bedside, all questions asked and answered, no concerns noted. No distress.

## 2025-03-15 NOTE — PLAN OF CARE
Problem: Adult Inpatient Plan of Care  Goal: Plan of Care Review  Outcome: Progressing  Goal: Patient-Specific Goal (Individualized)  Outcome: Progressing  Goal: Absence of Hospital-Acquired Illness or Injury  Outcome: Progressing  Goal: Optimal Comfort and Wellbeing  Outcome: Progressing  Goal: Readiness for Transition of Care  Outcome: Progressing     Problem: Infection  Goal: Absence of Infection Signs and Symptoms  Outcome: Progressing     Problem: Wound  Goal: Optimal Coping  Outcome: Progressing  Goal: Optimal Functional Ability  Outcome: Progressing  Goal: Absence of Infection Signs and Symptoms  Outcome: Progressing  Goal: Improved Oral Intake  Outcome: Progressing  Goal: Optimal Pain Control and Function  Outcome: Progressing  Goal: Skin Health and Integrity  Outcome: Progressing  Goal: Optimal Wound Healing  Outcome: Progressing     Problem: Fall Injury Risk  Goal: Absence of Fall and Fall-Related Injury  Outcome: Progressing     Problem: Wound  Goal: Optimal Coping  Outcome: Progressing     Problem: Fall Injury Risk  Goal: Absence of Fall and Fall-Related Injury  Outcome: Progressing   Pt AAOX4, tolerated liquid diet, pt denies pain and nausea. Call bell within reach no distress noted

## 2025-03-16 VITALS
DIASTOLIC BLOOD PRESSURE: 85 MMHG | RESPIRATION RATE: 18 BRPM | HEART RATE: 54 BPM | HEIGHT: 67 IN | OXYGEN SATURATION: 96 % | WEIGHT: 130.06 LBS | BODY MASS INDEX: 20.41 KG/M2 | SYSTOLIC BLOOD PRESSURE: 162 MMHG | TEMPERATURE: 98 F

## 2025-03-16 LAB
ANION GAP SERPL CALC-SCNC: 10 MMOL/L (ref 8–16)
BASOPHILS # BLD AUTO: 0.02 K/UL (ref 0–0.2)
BASOPHILS NFR BLD: 0.1 % (ref 0–1.9)
BUN SERPL-MCNC: 14 MG/DL (ref 6–20)
CALCIUM SERPL-MCNC: 10.1 MG/DL (ref 8.7–10.5)
CHLORIDE SERPL-SCNC: 97 MMOL/L (ref 95–110)
CO2 SERPL-SCNC: 27 MMOL/L (ref 23–29)
CREAT SERPL-MCNC: 0.7 MG/DL (ref 0.5–1.4)
DIFFERENTIAL METHOD BLD: ABNORMAL
EOSINOPHIL # BLD AUTO: 0 K/UL (ref 0–0.5)
EOSINOPHIL NFR BLD: 0 % (ref 0–8)
ERYTHROCYTE [DISTWIDTH] IN BLOOD BY AUTOMATED COUNT: 12.1 % (ref 11.5–14.5)
EST. GFR  (NO RACE VARIABLE): >60 ML/MIN/1.73 M^2
GLUCOSE SERPL-MCNC: 114 MG/DL (ref 70–110)
HCT VFR BLD AUTO: 38.7 % (ref 40–54)
HGB BLD-MCNC: 13.4 G/DL (ref 14–18)
IMM GRANULOCYTES # BLD AUTO: 0.24 K/UL (ref 0–0.04)
IMM GRANULOCYTES NFR BLD AUTO: 1.1 % (ref 0–0.5)
LYMPHOCYTES # BLD AUTO: 1.6 K/UL (ref 1–4.8)
LYMPHOCYTES NFR BLD: 7.3 % (ref 18–48)
MAGNESIUM SERPL-MCNC: 1.5 MG/DL (ref 1.6–2.6)
MCH RBC QN AUTO: 30.9 PG (ref 27–31)
MCHC RBC AUTO-ENTMCNC: 34.6 G/DL (ref 32–36)
MCV RBC AUTO: 89 FL (ref 82–98)
MONOCYTES # BLD AUTO: 1.8 K/UL (ref 0.3–1)
MONOCYTES NFR BLD: 8.1 % (ref 4–15)
NEUTROPHILS # BLD AUTO: 18.5 K/UL (ref 1.8–7.7)
NEUTROPHILS NFR BLD: 83.4 % (ref 38–73)
NRBC BLD-RTO: 0 /100 WBC
PHOSPHATE SERPL-MCNC: 1.7 MG/DL (ref 2.7–4.5)
PLATELET # BLD AUTO: 182 K/UL (ref 150–450)
PMV BLD AUTO: 10.5 FL (ref 9.2–12.9)
POTASSIUM SERPL-SCNC: 3.6 MMOL/L (ref 3.5–5.1)
RBC # BLD AUTO: 4.33 M/UL (ref 4.6–6.2)
SODIUM SERPL-SCNC: 134 MMOL/L (ref 136–145)
WBC # BLD AUTO: 22.21 K/UL (ref 3.9–12.7)

## 2025-03-16 PROCEDURE — 83735 ASSAY OF MAGNESIUM: CPT | Performed by: STUDENT IN AN ORGANIZED HEALTH CARE EDUCATION/TRAINING PROGRAM

## 2025-03-16 PROCEDURE — 25000003 PHARM REV CODE 250

## 2025-03-16 PROCEDURE — 84100 ASSAY OF PHOSPHORUS: CPT | Performed by: STUDENT IN AN ORGANIZED HEALTH CARE EDUCATION/TRAINING PROGRAM

## 2025-03-16 PROCEDURE — 85025 COMPLETE CBC W/AUTO DIFF WBC: CPT | Performed by: STUDENT IN AN ORGANIZED HEALTH CARE EDUCATION/TRAINING PROGRAM

## 2025-03-16 PROCEDURE — 63600175 PHARM REV CODE 636 W HCPCS: Performed by: UROLOGY

## 2025-03-16 PROCEDURE — 99238 HOSP IP/OBS DSCHRG MGMT 30/<: CPT | Mod: ,,, | Performed by: UROLOGY

## 2025-03-16 PROCEDURE — 63600175 PHARM REV CODE 636 W HCPCS

## 2025-03-16 PROCEDURE — 80048 BASIC METABOLIC PNL TOTAL CA: CPT | Performed by: STUDENT IN AN ORGANIZED HEALTH CARE EDUCATION/TRAINING PROGRAM

## 2025-03-16 PROCEDURE — 36415 COLL VENOUS BLD VENIPUNCTURE: CPT | Performed by: STUDENT IN AN ORGANIZED HEALTH CARE EDUCATION/TRAINING PROGRAM

## 2025-03-16 RX ORDER — SULFAMETHOXAZOLE AND TRIMETHOPRIM 400; 80 MG/1; MG/1
1 TABLET ORAL DAILY
Qty: 1 TABLET | Refills: 0 | Status: SHIPPED | OUTPATIENT
Start: 2025-03-16 | End: 2025-03-17

## 2025-03-16 RX ORDER — OXYCODONE HYDROCHLORIDE 5 MG/1
5 TABLET ORAL EVERY 6 HOURS PRN
Qty: 8 TABLET | Refills: 0 | Status: SHIPPED | OUTPATIENT
Start: 2025-03-16

## 2025-03-16 RX ORDER — SCOPOLAMINE 1 MG/3D
1 PATCH, EXTENDED RELEASE TRANSDERMAL
Qty: 2 PATCH | Refills: 0 | Status: SHIPPED | OUTPATIENT
Start: 2025-03-17 | End: 2025-03-22

## 2025-03-16 RX ORDER — ONDANSETRON 8 MG/1
8 TABLET, ORALLY DISINTEGRATING ORAL EVERY 6 HOURS PRN
Qty: 12 TABLET | Refills: 0 | Status: SHIPPED | OUTPATIENT
Start: 2025-03-16 | End: 2025-03-19

## 2025-03-16 RX ADMIN — LIDOCAINE 1 PATCH: 50 PATCH CUTANEOUS at 05:03

## 2025-03-16 RX ADMIN — METHOCARBAMOL 500 MG: 500 TABLET ORAL at 08:03

## 2025-03-16 RX ADMIN — PROCHLORPERAZINE EDISYLATE 2.5 MG: 5 INJECTION INTRAMUSCULAR; INTRAVENOUS at 08:03

## 2025-03-16 RX ADMIN — CEFEPIME 1 G: 1 INJECTION, POWDER, FOR SOLUTION INTRAMUSCULAR; INTRAVENOUS at 10:03

## 2025-03-16 RX ADMIN — PREGABALIN 50 MG: 50 CAPSULE ORAL at 08:03

## 2025-03-16 RX ADMIN — ONDANSETRON 8 MG: 8 TABLET, ORALLY DISINTEGRATING ORAL at 05:03

## 2025-03-16 RX ADMIN — ACETAMINOPHEN 1000 MG: 500 TABLET ORAL at 05:03

## 2025-03-16 NOTE — PROGRESS NOTES
[facility] - [facility]  Urology  Progress Note    Patient Name: Josh Ruth  MRN: [id number]  Admission Date: 3/12/2025  Hospital Length of Stay: 4 days  Code Status: Full Code   Attending Provider: Mesfin Mariee MD   Primary Care Physician: Rolando Rangel MD    Subjective:     HPI:  Mr Josh Ruth is a 60 year old male with a history of right ureteral stricture.    Interval History: No emesis yesterday. Some nausea. Increased oral intake. Passing lots of flatus, no bm. Minimal belchingAmbulating in hallways/    Review of Systems  Objective:     Temp:  [97.9 °F (36.6 °C)-98.7 °F (37.1 °C)] 97.9 °F (36.6 °C)  Pulse:  [54-65] 54  Resp:  [18-20] 18  SpO2:  [94 %-95 %] 95 %  BP: (161-167)/(76-87) 167/87     Body mass index is 20.37 kg/m².           Drains       Drain  Duration                  Closed/Suction Drain 03/13/25 1858 Tube - 1 RLQ Bulb 15 Fr. 2 days         Ureteral Drain/Stent 03/13/25 1817 Right ureter 6 Fr. 2 days                     Physical Exam  Vitals reviewed.   Constitutional:       General: He is awake. He is not in acute distress.  HENT:      Head: Normocephalic and atraumatic.   Cardiovascular:      Rate and Rhythm: Normal rate.   Pulmonary:      Effort: Pulmonary effort is normal. No respiratory distress.   Abdominal:      General: There is no distension.      Palpations: Abdomen is soft.      Tenderness: There is no abdominal tenderness.      Comments: Appropriately tender to palpation. Abdominal incisions covered with dermabond. C/D/I. ZORAIDA drain with appropriate thin serosanguinous output.   Genitourinary:     Comments: Voiding clear yellow urine  Skin:     General: Skin is warm and dry.   Neurological:      General: No focal deficit present.      Mental Status: He is alert and oriented to person, place, and time.           Significant Labs:    BMP:  Recent Labs   Lab 03/14/25  0601 03/15/25  0418 03/16/25  0715    136 134*   K 4.2 3.4* 3.6    105 97   CO2 23 23 27    BUN 15 14 14   CREATININE 0.8 0.7 0.7   CALCIUM 9.7 9.8 10.1       CBC:   Recent Labs   Lab 03/14/25  0601 03/15/25  0418 03/16/25  0715   WBC 21.14* 26.39* 22.21*   HGB 13.4* 13.1* 13.4*   HCT 39.0* 37.9* 38.7*    175 182       All pertinent labs results from the past 24 hours have been reviewed.    Significant Imaging:  All pertinent imaging results/findings from the past 24 hours have been reviewed.                  Assessment/Plan:     * Hydronephrosis due to obstruction of ureter  Josh Ruth is a 60 y.o. male with a right ureteral stricture. S/p right robotic ureteroureterostomy with buccal mucosal graft on 3/13.    - Patient seen and examined  - Progressing appropriately for post-operative period  - Strict I's/O's  - Drains: ZORAIDA drain with appropriate serosanguinous output. Will plan to remove prior to discharge  - Nausea control. Continue scopolamine   - Regular diet  - mIVF  - Abx: Continue cefepime 1g Q12   - Home meds reviewed and reconciled  - [facility]  - PPx: SCD's/ELBERT's/IS  -Will contact ID for home abx. Sensitive to IV abx only    Dispo: Possible discharge in the pm        VTE Risk Mitigation (From admission, onward)           Ordered     IP VTE LOW RISK PATIENT  Once         03/13/25 0958     Place sequential compression device  Until discontinued         03/13/25 0958     Place ELBERT hose  Until discontinued         03/13/25 0958     Place sequential compression device  Until discontinued         03/13/25 0958     Place sequential compression device  Until discontinued         03/12/25 1952                    Jostin Hemphill MD  Urology  [facility] - Surgery    Patient seen and examined.  His oral wound is healing well.  He feels well enough to go home today.  The ZORAIDA drain was removed.  Plan follow up with Dr. Mariee.

## 2025-03-16 NOTE — ASSESSMENT & PLAN NOTE
Josh Ruth is a 60 y.o. male with a right ureteral stricture. S/p right robotic ureteroureterostomy with buccal mucosal graft on 3/13.    - Patient seen and examined  - Progressing appropriately for post-operative period  - Strict I's/O's  - Drains: ZORAIDA drain with appropriate serosanguinous output  - Nausea control. Continue scopolamine   - Regular diet  - mIVF  - Abx: Continue cefepime 1g Q12   - Home meds reviewed and reconciled  - [facility]  - PPx: SCD's/ELBERT's/IS    Dispo: Discharge today

## 2025-03-16 NOTE — PLAN OF CARE
[facility]  Discharge Final Note    Primary Care Provider: Rolando Rangel MD    Expected Discharge Date: 3/16/2025    Final Discharge Note (most recent)       Final Note - 03/16/25 1208          Final Note    Assessment Type Final Discharge Note (P)      Anticipated Discharge Disposition Home or Self Care (P)      What phone number can be called within the next 1-3 days to see how you are doing after discharge? [phone] (P)      Hospital Resources/Appts/Education Provided Provided patient/caregiver with written discharge plan information;Provided education on problems/symptoms using teachback;Appointments scheduled and added to AVS (P)         Post-Acute Status    Post-Acute Authorization Other (P)      Other Status No Post-Acute Service Needs (P)      Discharge Delays None known at this time (P)                      Important Message from Medicare             Pt. discharging home with Self-Care. Upon review of pt's medical record, no discharge needs identified at this time.     Mandy Wellington LMSW

## 2025-03-16 NOTE — SUBJECTIVE & OBJECTIVE
Interval History: No emesis yesterday. Some nausea. Increased oral intake. Passing lots of flatus, no bm. Minimal belchingAmbulating in hallways/    Review of Systems  Objective:     Temp:  [97.9 °F (36.6 °C)-98.7 °F (37.1 °C)] 97.9 °F (36.6 °C)  Pulse:  [54-65] 54  Resp:  [18-20] 18  SpO2:  [94 %-95 %] 95 %  BP: (161-167)/(76-87) 167/87     Body mass index is 20.37 kg/m².           Drains       Drain  Duration                  Closed/Suction Drain 03/13/25 1858 Tube - 1 RLQ Bulb 15 Fr. 2 days         Ureteral Drain/Stent 03/13/25 1817 Right ureter 6 Fr. 2 days                     Physical Exam  Vitals reviewed.   Constitutional:       General: He is awake. He is not in acute distress.  HENT:      Head: Normocephalic and atraumatic.   Cardiovascular:      Rate and Rhythm: Normal rate.   Pulmonary:      Effort: Pulmonary effort is normal. No respiratory distress.   Abdominal:      General: There is no distension.      Palpations: Abdomen is soft.      Tenderness: There is no abdominal tenderness.      Comments: Appropriately tender to palpation. Abdominal incisions covered with dermabond. C/D/I. ZORAIDA drain with appropriate thin serosanguinous output.   Genitourinary:     Comments: Voiding clear yellow urine  Skin:     General: Skin is warm and dry.   Neurological:      General: No focal deficit present.      Mental Status: He is alert and oriented to person, place, and time.           Significant Labs:    BMP:  Recent Labs   Lab 03/14/25  0601 03/15/25  0418 03/16/25  0715    136 134*   K 4.2 3.4* 3.6    105 97   CO2 23 23 27   BUN 15 14 14   CREATININE 0.8 0.7 0.7   CALCIUM 9.7 9.8 10.1       CBC:   Recent Labs   Lab 03/14/25  0601 03/15/25  0418 03/16/25  0715   WBC 21.14* 26.39* 22.21*   HGB 13.4* 13.1* 13.4*   HCT 39.0* 37.9* 38.7*    175 182       All pertinent labs results from the past 24 hours have been reviewed.    Significant Imaging:  All pertinent imaging results/findings from the past 24  hours have been reviewed.

## 2025-03-16 NOTE — NURSING
Pt received discharge instructions, verbalized understanding of all instructions. Prescriptions delivered to bedside via pharmacy. Pt ambulated halls and voiding without difficulty. Pt experienced episode of nausea this morning - resolved with PRN medication and pt tolerated regular diet at lunch. Pain well controlled. Transporting out via wheelchair with father.

## 2025-03-16 NOTE — DISCHARGE SUMMARY
[facility]  Urology  Discharge Summary      Patient Name: Josh Ruth  MRN: [id number]  Admission Date: 3/12/2025  Hospital Length of Stay: 4 days  Discharge Date and Time:  03/16/2025 11:37 AM  Attending Physician: EZE MARCOS MD    Discharging Provider: EZE MARCOS MD  Primary Care Physician: Rolando Rangel MD    HPI:   Mr Josh Ruth is a 60 year old male with a history of right ureteral stricture.     Procedure(s) (LRB):  DV5 ROBOTIC URETEROURETEROSTOMY (Right)  CYSTOSCOPY, FLEXIBLE (N/A)  URETEROSCOPY (Right)  CYSTOSCOPY, WITH URETERAL STENT INSERTION (Right)  SURGICAL PROCUREMENT, GRAFT, BUCCAL MUCOSA (Left)  ROBOTIC URETEROLITHOTOMY (Right)  REMOVAL, NEPHROSTOMY TUBE, WITH IMAGING GUIDANCE (Right)     Indwelling Lines/Drains at time of discharge:   Lines/Drains/Airways       Drain  Duration                  Closed/Suction Drain 03/13/25 1858 Tube - 1 RLQ Bulb 15 Fr. 2 days         Ureteral Drain/Stent 03/13/25 1817 Right ureter 6 Fr. 2 days                    Hospital Course (synopsis of major diagnoses, care, treatment, and services provided during the course of the hospital stay):  The patient presented with the above history and underwent above procedure. He tolerated the procedure well. Please see the operative note for further procedure details. Vitals remained stable throughout the post operative period. Physical exam was appropriate. The patient was able to void without difficulty, and tolerate a regular diet prior to discharge. Patient was deemed suitable for discharge on No discharge date for patient encounter.       Medications and instructions as below.  For more thorough information, please refer to the hospital record and operative report.    Consults: None    Significant Diagnostic Studies:     Pending Diagnostic Studies:       Procedure Component Value Units Date/Time    Specimen to Pathology, Surgery Urology [[id number]] Collected: 03/13/25 1909    Order Status: Sent  Lab Status: In process Updated: 03/14/25 0828    Specimen: Tissue             Final Active Diagnoses:    Diagnosis Date Noted POA    PRINCIPAL PROBLEM:  Hydronephrosis due to obstruction of ureter [N13.1] 09/05/2023 Yes      Problems Resolved During this Admission:       Discharged Condition: good    Disposition: Home or Self Care    Follow Up: in clinic     Patient Instructions:      Diet Adult Regular     Lifting restrictions     Notify your health care provider if you experience any of the following:  temperature >100.4     Notify your health care provider if you experience any of the following:  persistent nausea and vomiting or diarrhea     Notify your health care provider if you experience any of the following:  severe uncontrolled pain     Notify your health care provider if you experience any of the following:  redness, tenderness, or signs of infection (pain, swelling, redness, odor or green/yellow discharge around incision site)     Notify your health care provider if you experience any of the following:  difficulty breathing or increased cough     Notify your health care provider if you experience any of the following:  severe persistent headache     Notify your health care provider if you experience any of the following:  worsening rash     Notify your health care provider if you experience any of the following:  persistent dizziness, light-headedness, or visual disturbances     Notify your health care provider if you experience any of the following:  increased confusion or weakness     Notify your health care provider if you experience any of the following:     Activity as tolerated     Medications:  Reconciled Home Medications:      Medication List        START taking these medications      ondansetron 8 MG Tbdl  Commonly known as: ZOFRAN-ODT  Take 1 tablet (8 mg total) by mouth every 6 (six) hours as needed (Nausea).     oxyCODONE 5 MG immediate release tablet  Commonly known as: ROXICODONE  Take 1  tablet (5 mg total) by mouth every 6 (six) hours as needed for Pain.     scopolamine 1.3-1.5 mg (1 mg over 3 days)  Commonly known as: TRANSDERM-SCOP  Place 1 patch onto the skin Every 3 (three) days. for 2 doses  Start taking on: March 17, 2025     sulfamethoxazole-trimethoprim 400-80mg 400-80 mg per tablet  Commonly known as: BACTRIM  Take 1 tablet by mouth once daily. Please take on day of ureteral stent removal. for 1 dose  Replaces: sulfamethoxazole-trimethoprim 800-160mg 800-160 mg Tab            CONTINUE taking these medications      cholecalciferol (vitamin D3) 1,250 mcg (50,000 unit) capsule  Take 50,000 Units by mouth every 7 days.     gabapentin 800 MG tablet  Commonly known as: NEURONTIN  Take 800 mg by mouth 3 (three) times daily as needed.     oxyCODONE-acetaminophen  mg per tablet  Commonly known as: PERCOCET  Take 1 tablet by mouth every 4 (four) hours.     propranoloL 10 MG tablet  Commonly known as: INDERAL  Take 10 mg by mouth 2 (two) times daily.            STOP taking these medications      sulfamethoxazole-trimethoprim 800-160mg 800-160 mg Tab  Commonly known as: BACTRIM DS  Replaced by: sulfamethoxazole-trimethoprim 400-80mg 400-80 mg per tablet              Time spent on the discharge of patient: 15 minutes    EZE MARCOS MD  Urology  [facility] - Surgery    As above.

## 2025-03-16 NOTE — DISCHARGE INSTRUCTIONS
What to expect with your robotic surgery  [facility]  After surgery  You may or may not have a drain that is shaped like a grenade and put to suction  This drain usually may or may not come out on Post op day 1. If you go home with a drain, the nurses will teach you how to record the output and you will come back 3-5 days after you leave to have the drain removed in clinic.  You will have a catheter after your surgery. It should come out the next day and you should be able to void on your own before you leave. If you are a male and on a BPH medicine, we will need to make sure you restart that the night of your surgery.  The night of surgery we expect and hope that you will:  Walk - walking helps get the bowels moving. Also after your surgery, you are at a risk for a deep venous thrombosis (which is a clot in the legs that can form by remaining inactive or still for extended periods of time) and this can travel to your lungs and make you feel short of breath. This is a very serious condition. Walking helps prevent a DVT from occurring.  Eat - you do not have to eat a whole meal, but we want to make sure you can tolerate liquid and/or solid food without nausea and vomiting  Use your incentive spirometer - this is the breathing apparatus that helps you expand your lungs. If and when you have pain you will not want to take deep breaths. But if you dont take deep breaths, you are at risk for pneumonia. The incentive spirometer will help prevent this from occurring by expanding your lungs.  Symptoms you may experience immediately post-op:  Bloating and/or shoulder pain if you had a laparoscopic procedure - when we do this operation, we fill up your abdominal cavity with gas to better help us visualize the organs and allow our instruments to fit. After the surgery, not all the air can be removed and your body will eventually absorb this small amount of air. However this can make you feel bloated. In addition, when  you sit up, the air can sit right under a muscle (the diaphragm) which has connecting nerves to the shoulders, which could explain why you have shoulder pain.  Do not expect necessarily to have gas or to have a bowel movement - this goes along with the bloating, you may feel like you want to pass gas or have a bowel movement but you cant. This is normal and you will feel like this for a couple days. There are no pills to help with this. Small walks throughout the day should help with this.  Pain - your pain should be able to be controlled with medicines by mouth that we prescribe. It is important for you to tell us if you are on any pain medications at home before the surgery as you may need stronger pain meds while in the hospital.  You can go home when:  Pain is controlled with medicines by mouth  You are able to walk without difficulty or pain  You are tolerating a regulating diet  Your are voiding. If you cannot void we may have to replace a catheter and you will follow up 3-5 days after you leave to have a voiding trial. The nurses will teach you how to care for your catheter.  When you go home:  Activity  Continue to walk - small walks throughout the day are better than one long walk.   Do not lift anything greater than 8 pounds for 6 weeks - we want your abdominal wall muscles to heal.  Bowel Movements - Do not strain to have a bowel movement - the pain medicines will make you constipated. That is why we also ask you to take colace 2-3 x per day to help keep your bowels regular. If you are still having trouble, then you can also add Miralax once a day. Do not take any stool softeners if you are having diarrhea.  Drain - If you have a drain (not your catheter, but a separate drain) then record the output and bring it with you to your next appointment  Smoking - If you smoke, we encourage you STOP. Smoking interferes with the healing process and your prolong your healing with continued smoking.  Driving - Do  not drive while you are on pain meds or with your catheter in place.  Bathing - If you do not have a drain, you can shower 48 hours after your surgery. If you do have a drain, sponge bathe only until the drain is out.  Dressing - you can remove the dressings if there is no drainage or change them as needed if there is. The little sterile band-aid strips will fall off on their own in 10-14 days. If they have not fallen off then you can remove them yourself.  Restarting medicines -especially blood thinners (Aspirin, Plavix, Coumadin), Fish Oil. Discuss this with your physician prior to discharge.  When to return to the ER  Fever - If you have a fever >101.5, this could be due to a number of reasons such as infection of the urine or incision. If your catheter has been removed, you could possibly have a leak. It would be best to come to the ER so they can better evaluate you.  Severe pain - pain is expected, but severe or new onset of pain is not normal.   Inability to tolerate food or liquid with nausea and vomiting - it would be best to go to the ER for them to better evaluate you.

## 2025-03-17 NOTE — PHYSICIAN QUERY
Please clarify the diagnosis related to the clinical findings.    Colonization only, please specify associated organism, if known:   PSEUDOMONAS LUTEOLA  >100,000 cfu/ml     Urine Culture, Routine  Abnormal   STAPHYLOCOCCUS AUREUS  10,000 - 49,999 cfu/ml

## 2025-03-18 LAB
FINAL PATHOLOGIC DIAGNOSIS: NORMAL
GROSS: NORMAL
Lab: NORMAL

## 2025-03-20 ENCOUNTER — TELEPHONE (OUTPATIENT)
Dept: UROLOGY | Facility: CLINIC | Age: 60
End: 2025-03-20
Payer: COMMERCIAL

## 2025-03-20 DIAGNOSIS — N13.5 URETERAL STRICTURE, RIGHT: Primary | ICD-10-CM

## 2025-03-21 ENCOUNTER — ANESTHESIA EVENT (OUTPATIENT)
Dept: SURGERY | Facility: HOSPITAL | Age: 60
End: 2025-03-21
Payer: COMMERCIAL

## 2025-03-21 NOTE — ANESTHESIA PAT ROS NOTE
03/21/2025  Josh Ruth is a 60 y.o., male.      Pre-op Assessment    I have reviewed the NPO Status.   I have reviewed the Medications.     Review of Systems  Anesthesia Hx:  No problems with previous Anesthesia   History of prior surgery of interest to airway management or planning:  Previous anesthesia: General 03/13/2025 DV5 ROBOTIC URETEROURETEROSTOMY (Right: Ureter)  CYSTOSCOPY, FLEXIBLE  URETEROSCOPY (Right: Ureter)  CYSTOSCOPY, WITH URETERAL STENT INSERTION (Right: Ureter)  SURGICAL PROCUREMENT, GRAFT, BUCCAL MUCOSA (Left: Mouth)  ROBOTIC URETEROLITHOTOMY (Right: Ureter)  REMOVAL, NEPHROSTOMY TUBE, WITH IMAGING GUIDANCE (Right) with general anesthesia.        Denies Family Hx of Anesthesia complications.    Denies Personal Hx of Anesthesia complications.                    Cardiovascular:     Hypertension, well controlled   Denies MI.        Denies Angina.       Denies ALFONSO.      Functional Capacity low / < 4 METS                         Renal/:   Renal Symptoms/Infections/Stones:      Kidney Function/Disease (hydronephrosis)             Musculoskeletal:  Arthritis    Musculoskeletal General/Symptoms:   Back Sx 2019           Neurological:    Denies CVA.    Denies Seizures.                                Endocrine:     Hyperparathyroid         Psych:  Psychiatric History                       Anesthesia Assessment: Preoperative EQUATION    Planned Procedure: Procedure(s) (LRB):  CYSTOSCOPY (N/A)  CYSTOSCOPY, WITH RETROGRADE PYELOGRAM (Right)  CYSTOSCOPY, WITH URETERAL STENT REMOVAL (Right)  Requested Anesthesia Type:Monitor Anesthesia Care  Surgeon: Mesfin Mariee MD  Service: Urology  Known or anticipated Date of Surgery:4/10/2025    Surgeon notes: reviewed    Electronic QUestionnaire Assessment completed via nurse interview with patient.        Triage considerations:     The  patient has no apparent active cardiac condition (No unstable coronary Syndrome such as severe unstable angina or recent [<1 month] myocardial infarction, decompensated CHF, severe valvular   disease or significant arrhythmia)    Previous anesthesia records:GETA and No problems    Last PCP note: within 1 month , within [facility]   Subspecialty notes: Urology    Other important co-morbidities: HTN and CKD, h/o back sx 2019, Hyperparathyroid, kidney stones arthritis, Hydronephrosis        Tests already available:  Available tests,  within 1 month , within [facility] .  03/16/2025 BMP, CBC  03/03/2025 EKG              Instructions given. (See in Nurse's note)    Optimization:  Anesthesia Preop Clinic Assessment  Indicated phone screen      Plan:    Testing:  None Needed    Navigation:             Straight Line to surgery.               No tests, anesthesia preop clinic visit, or consult required.

## 2025-04-02 ENCOUNTER — TELEPHONE (OUTPATIENT)
Dept: UROLOGY | Facility: CLINIC | Age: 60
End: 2025-04-02
Payer: COMMERCIAL

## 2025-04-02 DIAGNOSIS — N13.5 URETERAL STRICTURE, RIGHT: Primary | ICD-10-CM

## 2025-04-02 NOTE — TELEPHONE ENCOUNTER
Returned patient's call.  Patient reports cloudy urine.  Ordered urinalysis.  Given instructions to present to any [facility] lab to provide sample.  Patient verbalized understanding.

## 2025-04-03 ENCOUNTER — LAB VISIT (OUTPATIENT)
Dept: LAB | Facility: HOSPITAL | Age: 60
End: 2025-04-03
Attending: UROLOGY
Payer: COMMERCIAL

## 2025-04-03 ENCOUNTER — TELEPHONE (OUTPATIENT)
Dept: UROLOGY | Facility: CLINIC | Age: 60
End: 2025-04-03
Payer: COMMERCIAL

## 2025-04-03 ENCOUNTER — RESULTS FOLLOW-UP (OUTPATIENT)
Dept: UROLOGY | Facility: HOSPITAL | Age: 60
End: 2025-04-03

## 2025-04-03 DIAGNOSIS — N13.5 URETERAL STRICTURE, RIGHT: ICD-10-CM

## 2025-04-03 LAB
BACTERIA #/AREA URNS HPF: ABNORMAL /HPF
BILIRUB UR QL STRIP.AUTO: ABNORMAL
CLARITY UR: ABNORMAL
COLOR UR AUTO: ABNORMAL
GLUCOSE UR QL STRIP: NEGATIVE
HGB UR QL STRIP: ABNORMAL
HYALINE CASTS #/AREA URNS LPF: 0 /LPF (ref 0–1)
KETONES UR QL STRIP: NEGATIVE
LEUKOCYTE ESTERASE UR QL STRIP: ABNORMAL
MICROSCOPIC COMMENT: ABNORMAL
NITRITE UR QL STRIP: POSITIVE
PH UR STRIP: 7 [PH]
PROT UR QL STRIP: ABNORMAL
RBC #/AREA URNS HPF: >100 /HPF (ref 0–4)
SP GR UR STRIP: 1.02
SQUAMOUS #/AREA URNS HPF: 3 /HPF
WBC #/AREA URNS HPF: >100 /HPF (ref 0–5)
WBC CLUMPS URNS QL MICRO: ABNORMAL
YEAST URNS QL MICRO: ABNORMAL /HPF

## 2025-04-03 PROCEDURE — 81003 URINALYSIS AUTO W/O SCOPE: CPT | Mod: PO

## 2025-04-03 PROCEDURE — 87086 URINE CULTURE/COLONY COUNT: CPT

## 2025-04-03 RX ORDER — SULFAMETHOXAZOLE AND TRIMETHOPRIM 800; 160 MG/1; MG/1
1 TABLET ORAL 2 TIMES DAILY
Qty: 14 TABLET | Refills: 0 | Status: SHIPPED | OUTPATIENT
Start: 2025-04-03 | End: 2025-04-10

## 2025-04-03 NOTE — TELEPHONE ENCOUNTER
Notified patient of antibiotic sent to pharmacy.  Advised to take medication until all gone and that the antibiotic may need to be changed after urine culture results.  Patient verbalized understanding.

## 2025-04-06 LAB — BACTERIA UR CULT: ABNORMAL

## 2025-04-09 ENCOUNTER — TELEPHONE (OUTPATIENT)
Dept: UROLOGY | Facility: CLINIC | Age: 60
End: 2025-04-09
Payer: COMMERCIAL

## 2025-04-09 NOTE — TELEPHONE ENCOUNTER
Instructions for Day of Surgery at [facility]      Report To:    [facility], 1st floor of the hospital    Arrival time: 5:30am    Night Before Surgery     Nothing to eat or drink after midnight the night before your surgery  Take medications as instructed the morning of surgery  No alcoholic beverages 24 hours prior to surgery

## 2025-04-10 ENCOUNTER — HOSPITAL ENCOUNTER (OUTPATIENT)
Facility: HOSPITAL | Age: 60
Discharge: HOME | End: 2025-04-10
Attending: UROLOGY | Admitting: UROLOGY
Payer: COMMERCIAL

## 2025-04-10 ENCOUNTER — ANESTHESIA (OUTPATIENT)
Dept: SURGERY | Facility: HOSPITAL | Age: 60
End: 2025-04-10
Payer: COMMERCIAL

## 2025-04-10 DIAGNOSIS — N13.1 HYDRONEPHROSIS DUE TO OBSTRUCTION OF URETER: Primary | ICD-10-CM

## 2025-04-10 DIAGNOSIS — N13.5 URETERAL STRICTURE: ICD-10-CM

## 2025-04-10 PROCEDURE — 36000706: Performed by: UROLOGY

## 2025-04-10 PROCEDURE — C2617 STENT, NON-COR, TEM W/O DEL: HCPCS | Performed by: UROLOGY

## 2025-04-10 PROCEDURE — 71000015 HC POSTOP RECOV 1ST HR: Performed by: UROLOGY

## 2025-04-10 PROCEDURE — 63600175 PHARM REV CODE 636 W HCPCS

## 2025-04-10 PROCEDURE — 37000009 HC ANESTHESIA EA ADD 15 MINS: Performed by: UROLOGY

## 2025-04-10 PROCEDURE — 74420 UROGRAPHY RTRGR +-KUB: CPT | Mod: 26,,, | Performed by: UROLOGY

## 2025-04-10 PROCEDURE — 52332 CYSTOSCOPY AND TREATMENT: CPT | Mod: 58,RT,, | Performed by: UROLOGY

## 2025-04-10 PROCEDURE — 25000003 PHARM REV CODE 250

## 2025-04-10 PROCEDURE — 71000044 HC DOSC ROUTINE RECOVERY FIRST HOUR: Performed by: UROLOGY

## 2025-04-10 PROCEDURE — 36000707: Performed by: UROLOGY

## 2025-04-10 PROCEDURE — C1769 GUIDE WIRE: HCPCS | Performed by: UROLOGY

## 2025-04-10 PROCEDURE — C1758 CATHETER, URETERAL: HCPCS | Performed by: UROLOGY

## 2025-04-10 PROCEDURE — 25500020 PHARM REV CODE 255: Performed by: UROLOGY

## 2025-04-10 PROCEDURE — 37000008 HC ANESTHESIA 1ST 15 MINUTES: Performed by: UROLOGY

## 2025-04-10 DEVICE — STENT URETERAL UNIV 7FR 24CM: Type: IMPLANTABLE DEVICE | Site: URETER | Status: FUNCTIONAL

## 2025-04-10 RX ORDER — PROPOFOL 10 MG/ML
VIAL (ML) INTRAVENOUS CONTINUOUS PRN
Status: DISCONTINUED | OUTPATIENT
Start: 2025-04-10 | End: 2025-04-10

## 2025-04-10 RX ORDER — CEFEPIME HYDROCHLORIDE 1 G/1
1 INJECTION, POWDER, FOR SOLUTION INTRAMUSCULAR; INTRAVENOUS
Status: COMPLETED | OUTPATIENT
Start: 2025-04-10 | End: 2025-04-10

## 2025-04-10 RX ORDER — GLUCAGON 1 MG
1 KIT INJECTION
Status: DISCONTINUED | OUTPATIENT
Start: 2025-04-10 | End: 2025-04-10 | Stop reason: HOSPADM

## 2025-04-10 RX ORDER — PROPOFOL 10 MG/ML
VIAL (ML) INTRAVENOUS
Status: DISCONTINUED | OUTPATIENT
Start: 2025-04-10 | End: 2025-04-10

## 2025-04-10 RX ORDER — LIDOCAINE HYDROCHLORIDE 20 MG/ML
INJECTION INTRAVENOUS
Status: DISCONTINUED | OUTPATIENT
Start: 2025-04-10 | End: 2025-04-10

## 2025-04-10 RX ORDER — FENTANYL CITRATE 50 UG/ML
INJECTION, SOLUTION INTRAMUSCULAR; INTRAVENOUS
Status: DISCONTINUED | OUTPATIENT
Start: 2025-04-10 | End: 2025-04-10

## 2025-04-10 RX ORDER — MIDAZOLAM HYDROCHLORIDE 1 MG/ML
INJECTION INTRAMUSCULAR; INTRAVENOUS
Status: DISCONTINUED | OUTPATIENT
Start: 2025-04-10 | End: 2025-04-10

## 2025-04-10 RX ORDER — GENTAMICIN SULFATE 100 MG/100ML
240 INJECTION, SOLUTION INTRAVENOUS
Status: COMPLETED | OUTPATIENT
Start: 2025-04-10 | End: 2025-04-10

## 2025-04-10 RX ORDER — PHENYLEPHRINE HYDROCHLORIDE 10 MG/ML
INJECTION INTRAVENOUS
Status: DISCONTINUED | OUTPATIENT
Start: 2025-04-10 | End: 2025-04-10

## 2025-04-10 RX ORDER — PHENAZOPYRIDINE HYDROCHLORIDE 100 MG/1
100 TABLET, FILM COATED ORAL 3 TIMES DAILY PRN
Qty: 15 TABLET | Refills: 0 | Status: SHIPPED | OUTPATIENT
Start: 2025-04-10 | End: 2025-04-17

## 2025-04-10 RX ORDER — TAMSULOSIN HYDROCHLORIDE 0.4 MG/1
0.4 CAPSULE ORAL DAILY
Qty: 14 CAPSULE | Refills: 0 | Status: SHIPPED | OUTPATIENT
Start: 2025-04-10 | End: 2025-04-24

## 2025-04-10 RX ADMIN — PROPOFOL 80 MG: 10 INJECTION, EMULSION INTRAVENOUS at 07:04

## 2025-04-10 RX ADMIN — FENTANYL CITRATE 25 MCG: 50 INJECTION, SOLUTION INTRAMUSCULAR; INTRAVENOUS at 07:04

## 2025-04-10 RX ADMIN — LIDOCAINE HYDROCHLORIDE 100 MG: 20 INJECTION INTRAVENOUS at 07:04

## 2025-04-10 RX ADMIN — PROPOFOL 150 MCG/KG/MIN: 10 INJECTION, EMULSION INTRAVENOUS at 07:04

## 2025-04-10 RX ADMIN — MIDAZOLAM HYDROCHLORIDE 2 MG: 2 INJECTION, SOLUTION INTRAMUSCULAR; INTRAVENOUS at 07:04

## 2025-04-10 RX ADMIN — SODIUM CHLORIDE: 9 INJECTION, SOLUTION INTRAVENOUS at 07:04

## 2025-04-10 RX ADMIN — PHENYLEPHRINE HYDROCHLORIDE 50 MCG: 10 INJECTION INTRAVENOUS at 07:04

## 2025-04-10 RX ADMIN — CEFEPIME 1 G: 1 INJECTION, POWDER, FOR SOLUTION INTRAMUSCULAR; INTRAVENOUS at 07:04

## 2025-04-10 RX ADMIN — GENTAMICIN SULFATE 240 MG: 40 INJECTION, SOLUTION INTRAMUSCULAR; INTRAVENOUS at 06:04

## 2025-04-10 NOTE — DISCHARGE INSTRUCTIONS
ACTIVITY  There are no restrictions in activity. Start doing again the things you did before the procedure.  You may experience a slight burning sensation. You may notice a small amount of blood in your urine. This will clear up within a day. Call the clinic if this continues beyond 48 hours.     DIET  Continue your normal diet. You may eat the same foods you ate before your procedure.  Drink plenty of fluids during the first 24-48 hours following your procedure.     MEDICATIONS  Resume all other previous medications from your prescribing physician.  Continue any pre-procedure antibiotics until they are all gone.     SIGNS AND SYMPTOMS TO REPORT TO THE DOCTOR  Chills or fever greater than 101° F within 24 hours of procedure.  Changes in urination, such as increased bleeding, foul smell, cloudy urine, or painful urination.  Call your doctor with any questions or concerns.     For any emergency situation, call 911 immediately or go to your nearest emergency room.

## 2025-04-10 NOTE — ANESTHESIA PREPROCEDURE EVALUATION
04/10/2025  Josh Ruth is a 60 y.o., male.    Planned Procedure:  Procedure(s) (LRB):  CYSTOSCOPY (N/A)  CYSTOSCOPY, WITH RETROGRADE PYELOGRAM (Right)  CYSTOSCOPY, WITH URETERAL STENT REMOVAL (Right)    Diagnosis:  1. Ureteral stricture          Pre-op Assessment    I have reviewed the Patient Summary Reports.    I have reviewed the NPO Status.   I have reviewed the Medications.     Review of Systems  Social:  Former Smoker   Marijuana user      Cardiovascular:     Hypertension                                          Pulmonary:  Pulmonary Normal                       Renal/:  Chronic Renal Disease renal calculi  Pre-op diagnosis: History of kidney stones               Psych:  Psychiatric History                  Physical Exam  General: Well nourished, Alert, Oriented and Cooperative    Airway:  Mouth Opening: Normal  TM Distance: Normal  Neck ROM: Normal ROM    Chest/Lungs:  Normal Respiratory Rate    Heart:  Rate: Normal        Anesthesia Plan  Type of Anesthesia, risks & benefits discussed:    Anesthesia Type: Gen Supraglottic Airway, Gen Natural Airway  Intra-op Monitoring Plan: Standard ASA Monitors  Post Op Pain Control Plan: multimodal analgesia and IV/PO Opioids PRN  Induction:  IV  Airway Plan: Video and Direct, Post-Induction  Informed Consent: Informed consent signed with the Patient and all parties understand the risks and agree with anesthesia plan.  All questions answered.   ASA Score: 2  Day of Surgery Review of History & Physical: H&P Update referred to the surgeon/provider.  Anesthesia Plan Notes: Dental injury information risk was given.  The patient is aware of the risk and understand the potential for dental injury.  Anesthesia risk and side effects were discussed.     Ready For Surgery From Anesthesia Perspective.     .

## 2025-04-10 NOTE — DISCHARGE SUMMARY
[facility] (1st Fl)  Discharge Note  Short Stay    Procedure(s) (LRB):  CYSTOSCOPY (N/A)  PYELOGRAM, RETROGRADE (Right)  REMOVAL-STENT (Right)  CYSTOSCOPY, WITH URETERAL STENT INSERTION (Right)      OUTCOME: Patient tolerated treatment/procedure well without complication and is now ready for discharge.    DISPOSITION: Home or Self Care    FINAL DIAGNOSIS:  <principal problem not specified>    FOLLOWUP: In clinic    DISCHARGE INSTRUCTIONS:  No discharge procedures on file.     TIME SPENT ON DISCHARGE: 15 minutes

## 2025-04-10 NOTE — BRIEF OP NOTE
[facility] (1st Fl)  Brief Operative Note    SUMMARY     Surgery Date: 4/10/2025     Surgeons and Role:     * Mesfin Mariee MD - Primary     * Brina Dinero MD - Resident - Assisting     * Lake Molina MD - Resident - Assisting        Pre-op Diagnosis:  Ureteral stricture, right [N13.5]    Post-op Diagnosis:  Post-Op Diagnosis Codes:     * Ureteral stricture, right [N13.5]    Procedure(s) (LRB):  CYSTOSCOPY (N/A)  PYELOGRAM, RETROGRADE (Right)  REMOVAL-STENT (Right)  CYSTOSCOPY, WITH URETERAL STENT INSERTION (Right)    Anesthesia: General/MAC    Implants:  Implant Name Type Inv. Item Serial No.  Lot No. LRB No. Used Action   STENT URETERAL UNIV 7FR 24CM - [id number]  STENT URETERAL UNIV 7FR 24CM  [employer] company [employer] [id number] Right 1 Implanted       Operative Findings: Minimal contrast extravasation. Ureteral stent replaced.      Estimated Blood Loss: * No values recorded between 4/10/2025  7:03 AM and 4/10/2025  7:34 AM *    Estimated Blood Loss has not been documented. EBL = min.         Specimens:   Specimen (24h ago, onward)      None          * No specimens in log *    [id number]

## 2025-04-10 NOTE — TRANSFER OF CARE
"Anesthesia Transfer of Care Note    Patient: Josh Ruth    Procedure(s) Performed: Procedure(s) (LRB):  CYSTOSCOPY (N/A)  PYELOGRAM, RETROGRADE (Right)  REMOVAL-STENT (Right)  CYSTOSCOPY, WITH URETERAL STENT INSERTION (Right)    Patient location: PACU    Anesthesia Type: general    Transport from OR: Transported from OR on 6-10 L/min O2 by face mask with adequate spontaneous ventilation    Post pain: adequate analgesia    Post assessment: no apparent anesthetic complications    Post vital signs: stable    Level of consciousness: awake, alert and oriented    Nausea/Vomiting: no nausea/vomiting    Complications: none    Transfer of care protocol was followed      Last vitals: Visit Vitals  /77 (BP Location: Right arm, Patient Position: Lying)   Pulse 65   Temp 36.4 °C (97.5 °F) (Temporal)   Resp 16   Ht 5' 7" (1.702 m)   Wt 59 kg (130 lb 1.1 oz)   SpO2 98%   BMI 20.37 kg/m²     "

## 2025-04-10 NOTE — H&P
Urology [facility]    HPI:  Josh Ruth is a 60 y.o. male who is s/p right ureteroureterostomy who presents today for stent removal and retrograde pyelogram.     He reports that he has been taking the abx as prescribed. He has remained afebrile.     ROS:  Neg except per HPI    Past Medical History:   Diagnosis Date    Arthritis     Chronic low back pain     Hypertension     Kidney stones     Parathyroid disorder 08/2024    Renal disorder     hydronephrosis       Past Surgical History:   Procedure Laterality Date    ANTEGRADE NEPHROSTOGRAPHY Right 1/30/2025    Procedure: Nephrostogram - antegrade;  Surgeon: Mesfin Mariee MD;  Location: [facility] OR 1ST FLR;  Service: Urology;  Laterality: Right;    CYSTOGRAM  1/30/2025    Procedure: CYSTOGRAM;  Surgeon: Mesfin Mariee MD;  Location: [facility] OR 1ST FLR;  Service: Urology;;    CYSTOSCOPIC LITHOLAPAXY  7/3/2024    Procedure: CYSTOLITHOLAPAXY;  Surgeon: Paul Ruth MD;  Location: [facility] OR;  Service: Urology;;    CYSTOSCOPY N/A 1/30/2025    Procedure: CYSTOSCOPY;  Surgeon: Mesfin Mariee MD;  Location: [facility] OR 1ST FLR;  Service: Urology;  Laterality: N/A;    CYSTOSCOPY W/ LASER LITHOTRIPSY      CYSTOSCOPY W/ RETROGRADES Right 1/30/2025    Procedure: CYSTOSCOPY, WITH RETROGRADE PYELOGRAM;  Surgeon: Mesfin Mariee MD;  Location: [facility] OR 1ST FLR;  Service: Urology;  Laterality: Right;    CYSTOSCOPY W/ URETERAL STENT PLACEMENT Right 3/13/2025    Procedure: CYSTOSCOPY, WITH URETERAL STENT INSERTION;  Surgeon: Mesfin Mariee MD;  Location: [facility] OR 2ND FLR;  Service: Urology;  Laterality: Right;    CYSTOSCOPY W/ URETERAL STENT REMOVAL Right 8/7/2024    Procedure: CYSTOSCOPY, WITH URETERAL STENT REMOVAL;  Surgeon: Paul Ruth MD;  Location: [facility] OR;  Service: Urology;  Laterality: Right;    CYSTOSCOPY WITH CALCULUS EXTRACTION Right 7/3/2024    Procedure: CYSTOSCOPY, WITH CALCULUS REMOVAL;  Surgeon: Paul Ruth MD;  Location: [facility] OR;  Service:  Urology;  Laterality: Right;    CYSTOSCOPY WITH CALCULUS EXTRACTION Right 8/7/2024    Procedure: CYSTOSCOPY, WITH CALCULUS REMOVAL;  Surgeon: Paul Ruth MD;  Location: [facility] OR;  Service: Urology;  Laterality: Right;    CYSTOURETEROSCOPY WITH RETROGRADE PYELOGRAPHY AND INSERTION OF STENT INTO URETER Right 10/26/2023    Procedure: CYSTOURETEROSCOPY, WITH RETROGRADE PYELOGRAM AND URETERAL STENT INSERTION;  Surgeon: Paul Ruth MD;  Location: [facility] OR;  Service: Urology;  Laterality: Right;    CYSTOURETEROSCOPY WITH RETROGRADE PYELOGRAPHY AND INSERTION OF STENT INTO URETER Right 7/3/2024    Procedure: CYSTOURETEROSCOPY, WITH RETROGRADE PYELOGRAM AND URETERAL STENT INSERTION;  Surgeon: Paul Ruth MD;  Location: [facility] OR;  Service: Urology;  Laterality: Right;    CYSTOURETEROSCOPY WITH RETROGRADE PYELOGRAPHY AND INSERTION OF STENT INTO URETER Right 8/7/2024    Procedure: CYSTOURETEROSCOPY, WITH RETROGRADE PYELOGRAM AND URETERAL STENT INSERTION;  Surgeon: Paul Ruth MD;  Location: [facility] OR;  Service: Urology;  Laterality: Right;    DV5 ROBOTIC URETEROURETEROSTOMY Right 3/13/2025    Procedure: DV5 ROBOTIC URETEROURETEROSTOMY;  Surgeon: Mesfin Mariee MD;  Location: [facility] OR 2ND FLR;  Service: Urology;  Laterality: Right;    FLEXIBLE CYSTOSCOPY N/A 3/13/2025    Procedure: CYSTOSCOPY, FLEXIBLE;  Surgeon: Mesfin Mariee MD;  Location: [facility] OR 2ND FLR;  Service: Urology;  Laterality: N/A;    LASER LITHOTRIPSY Right 10/26/2023    Procedure: LITHOTRIPSY, USING LASER-THULIUM;  Surgeon: Paul Ruth MD;  Location: [facility] OR;  Service: Urology;  Laterality: Right;    LASER LITHOTRIPSY Right 7/3/2024    Procedure: LITHOTRIPSY, USING LASER, Thulium;  Surgeon: Paul Ruth MD;  Location: [facility] OR;  Service: Urology;  Laterality: Right;    LASER LITHOTRIPSY Right 8/7/2024    Procedure: LITHOTRIPSY, USING LASER - Thulium;  Surgeon: Paul Ruth MD;  Location: [facility] OR;  Service: Urology;  Laterality: Right;    LUMBAR  FUSION  12/2019    REMOVAL, NEPHROSTOMY TUBE, WITH IMAGING GUIDANCE Right 3/13/2025    Procedure: REMOVAL, NEPHROSTOMY TUBE, WITH IMAGING GUIDANCE;  Surgeon: Mesfin Mariee MD;  Location: [facility] OR [facility] FLR;  Service: Urology;  Laterality: Right;    ROBOT-ASSISTED URETEROLITHOTOMY Right 3/13/2025    Procedure: ROBOTIC URETEROLITHOTOMY;  Surgeon: Mesfin Mariee MD;  [facility];  Service: Urology;  Laterality: Right;    SURGICAL PROCUREMENT OF BUCCAL MUCOSA GRAFT Left 3/13/2025    Procedure: SURGICAL PROCUREMENT, GRAFT, BUCCAL MUCOSA;  Surgeon: Mesfin Mariee MD;  Location: [facility] OR [facility] FLR;  Service: Urology;  Laterality: Left;    URETEROSCOPY Right 3/13/2025    Procedure: URETEROSCOPY;  Surgeon: Mesfin Mariee MD;  [facility];  Service: Urology;  Laterality: Right;       Social History     Socioeconomic History    Marital status:    Tobacco Use    Smoking status: Never     Passive exposure: Never    Smokeless tobacco: Never   Substance and Sexual Activity    Alcohol use: Not Currently    Drug use: Yes     Types: Marijuana     Comment: weekly     Social Drivers of Health     Financial Resource Strain: Medium Risk (3/12/2025)    Overall Financial Resource Strain (CARDIA)     Difficulty of Paying Living Expenses: Somewhat hard   Food Insecurity: No Food Insecurity (3/12/2025)    Hunger Vital Sign     Worried About Running Out of Food in the Last Year: Never true     Ran Out of Food in the Last Year: Never true   Transportation Needs: No Transportation Needs (2/19/2025)    PRAPARE - Transportation     Lack of Transportation (Medical): No     Lack of Transportation (Non-Medical): No   Physical Activity: Sufficiently Active (2/19/2025)    Exercise Vital Sign     Days of Exercise per Week: 5 days     Minutes of Exercise per Session: 150+ min   Stress: No Stress Concern Present (3/12/2025)    [employer] [place] [employer] - Occupational Stress Questionnaire     " Feeling of Stress : Not at all   Housing Stability: Low Risk  (3/12/2025)    Housing Stability Vital Sign     Unable to Pay for Housing in the Last Year: No     Number of Times Moved in the Last Year: 0     Homeless in the Last Year: No       Family History   Problem Relation Name Age of Onset    No Known Problems Mother      No Known Problems Father Kevin Ruth     Kidney cancer Brother         Review of patient's allergies indicates:   Allergen Reactions    Pcn [penicillins] Hives       Medications Ordered Prior to Encounter[1]      Physical Exam:  Estimated body mass index is 20.37 kg/m² as calculated from the following:    Height as of this encounter: 5' 7" (1.702 m).    Weight as of this encounter: 59 kg (130 lb 1.1 oz).    General: No acute distress, well developed. AAOx3  Head: Normocephalic, Atraumatic  Eyes: Extra-occular movements intact, No discharge  Neck: supple, symmetrical, trachea midline  Lungs: normal respiratory effort, no respiratory distress, no wheezes  CV: regular rate, 2+ pulses  Abdomen: soft, non-tender, non-distended, no organomegaly  MSK: no edema, no deformities, normal ROM  Skin: skin color, texture, turgor normal.  Neurologic: no focal deficits, sensation intact    Labs:      Lab Results   Component Value Date    WBC 22.21 (H) 03/16/2025    HGB 13.4 (L) 03/16/2025    HCT 38.7 (L) 03/16/2025    MCV 89 03/16/2025     03/16/2025           BMP  Lab Results   Component Value Date     (L) 03/16/2025    K 3.6 03/16/2025    CL 97 03/16/2025    CO2 27 03/16/2025    BUN 14 03/16/2025    CREATININE 0.7 03/16/2025    CALCIUM 10.1 03/16/2025    ANIONGAP 10 03/16/2025    EGFRNORACEVR >60.0 03/16/2025         Assessment: Josh Ruth is a 60 y.o. male with a history of right ureteral stricture.     Plan:     1. To OR for Retrograde pyelogram and stent removal.  2. Consents signed   3. I have explained the risk, benefits, and alternatives of the procedure in detail. The " patient voices understanding and all questions have been answered. The patient agrees to proceed as planned.     Lake Molina MD        [1]   Current Facility-Administered Medications on File Prior to Encounter   Medication Dose Route Frequency Provider Last Rate Last Admin    lactated ringers infusion   Intravenous Continuous Rick Barron MD 20 mL/hr at 10/26/23 0834 New Bag at 10/26/23 0834     Current Outpatient Medications on File Prior to Encounter   Medication Sig Dispense Refill    cholecalciferol, vitamin D3, 1,250 mcg (50,000 unit) capsule Take 50,000 Units by mouth every 7 days.      oxyCODONE-acetaminophen (PERCOCET)  mg per tablet Take 1 tablet by mouth every 4 (four) hours.      propranoloL (INDERAL) 10 MG tablet Take 10 mg by mouth 2 (two) times daily.      gabapentin (NEURONTIN) 800 MG tablet Take 800 mg by mouth 3 (three) times daily as needed.      oxyCODONE (ROXICODONE) 5 MG immediate release tablet Take 1 tablet (5 mg total) by mouth every 6 (six) hours as needed for Pain. 8 tablet 0

## 2025-04-10 NOTE — OP NOTE
[facility]  Operative Note    Date: 04/10/2025    Pre-Op Diagnosis: Right Ureteral Stricture   Patient Active Problem List    Diagnosis Date Noted    Chronic lower back pain 03/03/2025    Chronic, continuous use of opioids 03/03/2025    Ureteral stricture, right 03/02/2025    Hyperparathyroidism 10/22/2024    Hypercalcemia 10/22/2024    Kidney stones 09/05/2023    Hydronephrosis due to obstruction of ureter 09/05/2023       Post-Op Diagnosis: same    Procedure(s) Performed:   1.  Cystoscopy with right retrograde pyelogram  2.  Ureteral stent Exchange   3.  Fluoroscopy < 1 hour  4.  Intra-operative interpretation of radiographic images    Specimen(s): None    Staff Surgeon: Mesfin Mariee MD    Assistant Surgeon: Lake Molina MD    Anesthesia: General mask inhalational anesthesia    Indications: Josh Ruth is a 60 y.o. male who underwent a ureteroureterostomy with buccal graft mucosa on 3/13/2025. He presents today for stent removal and retrograde pyelogram.     Findings: Minimal contrast extravasation seen on retrograde pyelogram. Ureter and anastomosis is patent. Ureteral stent replaced.     Estimated Blood Loss: min    Drains: 7 fr x 24 cm ureteral stent on the right     Procedure in Detail:  After risks, benefits and possible complications of cystoscopy were explained, the patient elected to undergo the procedure and informed consent was obtained. All questions were answered in the ladonna-operative area. The patient was transferred to the cystoscopy suite and placed in the supine position.  SCDs were applied and working.  MAC anesthesia was administered.  Once adequately sedated, the patient was placed in the dorsal lithotomy position and prepped and draped in the usual sterile fashion. Time out was performed, ladonna-procedural antibiotics were confirmed.     A rigid cystoscope in a 22 Fr sheath was introduced into the bladder per urethra. This passed easily. The entire urethra was  visualized which showed no masses or strictures. The right and left ureteral orifices were identified in the normal anatomic position. The right ureteral stent was grasped with stent graspers and removed under live fluoroscopy. The proximal coil gave way and the stent was removed with no resistance. The distal end of the ureteral stent was mildly encrusted.     The right UO was identified and cannulated with a motion wire along with a 5 Fr open-ended ureteral catheter due to edema surrounding UO. Using fluoroscopy, a RPG was performed which showed the above findings. The 5 fr open ended catheter was advanced to the mid ureter and the retrograde pyelogram was performed again.    At this time the decision was made to place another ureteral stent. The motion wire was advanced to the renal pelvis under live fluoroscopy. The 5 fr open ended catheter was then removed from the motion wire. A 7 fr x 24 cm ureteral stent was then advanced over the wire under direct vision. A 180 degree proximal coil was noted in the renal pelvis with fluoroscopy along with a 180 degree distal coil in the bladder.      The bladder was drained, and the patient was removed from lithotomy.     The patient tolerated the procedure well and was transferred to recovery in stable condition.    Disposition:  The patient will follow up with Dr. Mariee in 2 weeks with a CT Urogram prior.       Lake Molina MD     I have reviewed the operative note performed by Dr. Molina, and I concur with her/his documentation of Josh Ruth. I was present for the critical or key portions of the procedure.

## 2025-04-11 VITALS
SYSTOLIC BLOOD PRESSURE: 123 MMHG | HEIGHT: 67 IN | WEIGHT: 130.06 LBS | BODY MASS INDEX: 20.41 KG/M2 | DIASTOLIC BLOOD PRESSURE: 67 MMHG | HEART RATE: 52 BPM | TEMPERATURE: 98 F | OXYGEN SATURATION: 100 % | RESPIRATION RATE: 14 BRPM

## 2025-04-11 NOTE — ANESTHESIA POSTPROCEDURE EVALUATION
Anesthesia Post Evaluation    Patient: Josh Ruth    Procedure(s) Performed: Procedure(s) (LRB):  CYSTOSCOPY (N/A)  PYELOGRAM, RETROGRADE (Right)  REMOVAL-STENT (Right)  CYSTOSCOPY, WITH URETERAL STENT INSERTION (Right)    Final Anesthesia Type: general      Patient location during evaluation: PACU  Patient participation: Yes- Able to Participate  Level of consciousness: awake and alert and sedated  Post-procedure vital signs: reviewed and stable  Pain management: adequate  Airway patency: patent    PONV status at discharge: No PONV  Anesthetic complications: no      Cardiovascular status: blood pressure returned to baseline  Respiratory status: unassisted  Hydration status: euvolemic  Follow-up not needed.              Vitals Value Taken Time   /67 04/10/25 08:30   Temp 36.6 °C (97.9 °F) 04/10/25 07:40   Pulse 52 04/10/25 08:30   Resp 14 04/10/25 08:30   SpO2 100 % 04/10/25 08:30         No case tracking events are documented in the log.      Pain/Teri Score: Teri Score: 10 (4/10/2025  8:00 AM)

## 2025-04-14 DIAGNOSIS — N13.5 URETERAL STRICTURE, RIGHT: Primary | ICD-10-CM

## 2025-04-21 ENCOUNTER — TELEPHONE (OUTPATIENT)
Dept: UROLOGY | Facility: CLINIC | Age: 60
End: 2025-04-21
Payer: COMMERCIAL

## 2025-04-21 NOTE — TELEPHONE ENCOUNTER
Spoke with patient and confirmed appointment 04/22/25 @[facility] 2nd floor, Need a urine sample and please arrive 15 min prior.

## 2025-04-22 ENCOUNTER — PROCEDURE VISIT (OUTPATIENT)
Dept: UROLOGY | Facility: CLINIC | Age: 60
End: 2025-04-22
Payer: COMMERCIAL

## 2025-04-22 VITALS
RESPIRATION RATE: 20 BRPM | TEMPERATURE: 98 F | SYSTOLIC BLOOD PRESSURE: 118 MMHG | HEART RATE: 96 BPM | DIASTOLIC BLOOD PRESSURE: 81 MMHG

## 2025-04-22 DIAGNOSIS — N13.5 URETERAL STRICTURE, RIGHT: ICD-10-CM

## 2025-04-22 PROCEDURE — 52310 CYSTOSCOPY AND TREATMENT: CPT | Mod: 79,S$GLB,, | Performed by: UROLOGY

## 2025-04-22 RX ORDER — LIDOCAINE HYDROCHLORIDE 20 MG/ML
JELLY TOPICAL
Status: COMPLETED | OUTPATIENT
Start: 2025-04-22 | End: 2025-04-22

## 2025-04-22 RX ORDER — SULFAMETHOXAZOLE AND TRIMETHOPRIM 800; 160 MG/1; MG/1
1 TABLET ORAL
Status: COMPLETED | OUTPATIENT
Start: 2025-04-22 | End: 2025-04-22

## 2025-04-22 RX ADMIN — LIDOCAINE HYDROCHLORIDE 10 ML: 20 JELLY TOPICAL at 12:04

## 2025-04-22 RX ADMIN — SULFAMETHOXAZOLE AND TRIMETHOPRIM 1 TABLET: 800; 160 TABLET ORAL at 01:04

## 2025-04-22 NOTE — PATIENT INSTRUCTIONS
What to Expect After a Cystoscopy with Stent Removal  For the next 24-48 hours, you may feel a mild burning when you urinate. This burning is normal and expected. Drink plenty of water to dilute the urine to help relieve the burning sensation. You may also see a small amount of blood in your urine after the procedure.    Unless you are already taking antibiotics, you may be given an antibiotic after the test to prevent infection.    Signs and Symptoms to Report  Call the [facility] at [phone] if you develop any of the following:  Fever of 101 degrees or higher  Chills or persistent bleeding  Inability to urinate    After hours or on weekends, you may reach a urology resident on call at this number: [phone].

## 2025-04-28 ENCOUNTER — TELEPHONE (OUTPATIENT)
Dept: RADIOLOGY | Facility: HOSPITAL | Age: 60
End: 2025-04-28
Payer: COMMERCIAL

## 2025-04-29 DIAGNOSIS — N13.1 HYDRONEPHROSIS DUE TO OBSTRUCTION OF URETER: Primary | ICD-10-CM

## 2025-05-01 ENCOUNTER — HOSPITAL ENCOUNTER (OUTPATIENT)
Dept: RADIOLOGY | Facility: HOSPITAL | Age: 60
Discharge: HOME | End: 2025-05-01
Payer: COMMERCIAL

## 2025-05-01 DIAGNOSIS — N13.1 HYDRONEPHROSIS DUE TO OBSTRUCTION OF URETER: ICD-10-CM

## 2025-05-01 PROCEDURE — 25500020 PHARM REV CODE 255: Mod: PO

## 2025-05-01 PROCEDURE — 74178 CT ABD&PLV WO CNTR FLWD CNTR: CPT | Mod: TC,PO

## 2025-05-01 PROCEDURE — 74178 CT ABD&PLV WO CNTR FLWD CNTR: CPT | Mod: 26,,, | Performed by: RADIOLOGY

## 2025-05-01 RX ADMIN — IOHEXOL 125 ML: 350 INJECTION, SOLUTION INTRAVENOUS at 03:05

## 2025-05-12 ENCOUNTER — HOSPITAL ENCOUNTER (OUTPATIENT)
Dept: RADIOLOGY | Facility: HOSPITAL | Age: 60
Discharge: HOME | End: 2025-05-12
Attending: INTERNAL MEDICINE
Payer: COMMERCIAL

## 2025-05-12 DIAGNOSIS — E21.0 PRIMARY HYPERPARATHYROIDISM: ICD-10-CM

## 2025-05-12 PROCEDURE — 25500020 PHARM REV CODE 255: Mod: PO | Performed by: INTERNAL MEDICINE

## 2025-05-12 PROCEDURE — 70492 CT SFT TSUE NCK W/O & W/DYE: CPT | Mod: 26,,, | Performed by: RADIOLOGY

## 2025-05-12 PROCEDURE — 70492 CT SFT TSUE NCK W/O & W/DYE: CPT | Mod: TC,PO

## 2025-05-12 RX ADMIN — IOHEXOL 100 ML: 350 INJECTION, SOLUTION INTRAVENOUS at 01:05

## 2025-05-14 ENCOUNTER — PATIENT MESSAGE (OUTPATIENT)
Dept: ENDOCRINOLOGY | Facility: CLINIC | Age: 60
End: 2025-05-14
Payer: COMMERCIAL

## 2025-05-14 ENCOUNTER — PATIENT MESSAGE (OUTPATIENT)
Dept: SURGERY | Facility: CLINIC | Age: 60
End: 2025-05-14
Payer: COMMERCIAL

## 2025-05-14 DIAGNOSIS — E21.3 HYPERPARATHYROIDISM: Primary | ICD-10-CM

## 2025-05-19 ENCOUNTER — LAB VISIT (OUTPATIENT)
Dept: LAB | Facility: HOSPITAL | Age: 60
End: 2025-05-19
Attending: SURGERY
Payer: COMMERCIAL

## 2025-05-19 DIAGNOSIS — E21.3 HYPERPARATHYROIDISM: ICD-10-CM

## 2025-05-19 LAB
ALBUMIN SERPL BCP-MCNC: 3.4 G/DL (ref 3.5–5.2)
ANION GAP (OHS): 7 MMOL/L (ref 8–16)
BUN SERPL-MCNC: 17 MG/DL (ref 6–20)
CALCIUM SERPL-MCNC: 10.2 MG/DL (ref 8.7–10.5)
CHLORIDE SERPL-SCNC: 107 MMOL/L (ref 95–110)
CO2 SERPL-SCNC: 27 MMOL/L (ref 23–29)
CREAT SERPL-MCNC: 1.1 MG/DL (ref 0.5–1.4)
GFR SERPLBLD CREATININE-BSD FMLA CKD-EPI: >60 ML/MIN/1.73/M2
GLUCOSE SERPL-MCNC: 122 MG/DL (ref 70–110)
PHOSPHATE SERPL-MCNC: 2 MG/DL (ref 2.7–4.5)
POTASSIUM SERPL-SCNC: 4.3 MMOL/L (ref 3.5–5.1)
PTH-INTACT SERPL-MCNC: 125.6 PG/ML (ref 9–77)
SODIUM SERPL-SCNC: 141 MMOL/L (ref 136–145)

## 2025-05-19 PROCEDURE — 82040 ASSAY OF SERUM ALBUMIN: CPT

## 2025-05-19 PROCEDURE — 36415 COLL VENOUS BLD VENIPUNCTURE: CPT | Mod: PO

## 2025-05-19 PROCEDURE — 83970 ASSAY OF PARATHORMONE: CPT

## 2025-05-22 ENCOUNTER — RESULTS FOLLOW-UP (OUTPATIENT)
Dept: SURGERY | Facility: CLINIC | Age: 60
End: 2025-05-22

## 2025-05-22 ENCOUNTER — OFFICE VISIT (OUTPATIENT)
Dept: SURGERY | Facility: CLINIC | Age: 60
End: 2025-05-22
Payer: COMMERCIAL

## 2025-05-22 ENCOUNTER — LAB VISIT (OUTPATIENT)
Dept: LAB | Facility: HOSPITAL | Age: 60
End: 2025-05-22
Attending: SURGERY
Payer: COMMERCIAL

## 2025-05-22 VITALS
BODY MASS INDEX: 19.69 KG/M2 | SYSTOLIC BLOOD PRESSURE: 122 MMHG | HEART RATE: 70 BPM | OXYGEN SATURATION: 97 % | WEIGHT: 125.44 LBS | HEIGHT: 67 IN | DIASTOLIC BLOOD PRESSURE: 66 MMHG

## 2025-05-22 DIAGNOSIS — E21.3 HYPERPARATHYROIDISM: ICD-10-CM

## 2025-05-22 DIAGNOSIS — E21.3 HYPERPARATHYROIDISM: Primary | ICD-10-CM

## 2025-05-22 LAB
ABSOLUTE EOSINOPHIL (OHS): 0.18 K/UL
ABSOLUTE MONOCYTE (OHS): 0.76 K/UL (ref 0.3–1)
ABSOLUTE NEUTROPHIL COUNT (OHS): 7.45 K/UL (ref 1.8–7.7)
ALBUMIN SERPL BCP-MCNC: 3.4 G/DL (ref 3.5–5.2)
ALP SERPL-CCNC: 122 UNIT/L (ref 40–150)
ALT SERPL W/O P-5'-P-CCNC: 11 UNIT/L (ref 10–44)
ANION GAP (OHS): 9 MMOL/L (ref 8–16)
AST SERPL-CCNC: 16 UNIT/L (ref 11–45)
BASOPHILS # BLD AUTO: 0.04 K/UL
BASOPHILS NFR BLD AUTO: 0.4 %
BILIRUB SERPL-MCNC: 0.3 MG/DL (ref 0.1–1)
BUN SERPL-MCNC: 18 MG/DL (ref 6–20)
CALCIUM SERPL-MCNC: 10.4 MG/DL (ref 8.7–10.5)
CHLORIDE SERPL-SCNC: 106 MMOL/L (ref 95–110)
CO2 SERPL-SCNC: 27 MMOL/L (ref 23–29)
CREAT SERPL-MCNC: 1 MG/DL (ref 0.5–1.4)
ERYTHROCYTE [DISTWIDTH] IN BLOOD BY AUTOMATED COUNT: 13.8 % (ref 11.5–14.5)
GFR SERPLBLD CREATININE-BSD FMLA CKD-EPI: >60 ML/MIN/1.73/M2
GLUCOSE SERPL-MCNC: 63 MG/DL (ref 70–110)
HCT VFR BLD AUTO: 42 % (ref 40–54)
HGB BLD-MCNC: 13.7 GM/DL (ref 14–18)
IMM GRANULOCYTES # BLD AUTO: 0.06 K/UL (ref 0–0.04)
IMM GRANULOCYTES NFR BLD AUTO: 0.6 % (ref 0–0.5)
LYMPHOCYTES # BLD AUTO: 2.14 K/UL (ref 1–4.8)
MCH RBC QN AUTO: 29.3 PG (ref 27–31)
MCHC RBC AUTO-ENTMCNC: 32.6 G/DL (ref 32–36)
MCV RBC AUTO: 90 FL (ref 82–98)
NUCLEATED RBC (/100WBC) (OHS): 0 /100 WBC
PLATELET # BLD AUTO: 224 K/UL (ref 150–450)
PMV BLD AUTO: 9.6 FL (ref 9.2–12.9)
POTASSIUM SERPL-SCNC: 4.2 MMOL/L (ref 3.5–5.1)
PROT SERPL-MCNC: 7 GM/DL (ref 6–8.4)
RBC # BLD AUTO: 4.67 M/UL (ref 4.6–6.2)
RELATIVE EOSINOPHIL (OHS): 1.7 %
RELATIVE LYMPHOCYTE (OHS): 20.1 % (ref 18–48)
RELATIVE MONOCYTE (OHS): 7.1 % (ref 4–15)
RELATIVE NEUTROPHIL (OHS): 70.1 % (ref 38–73)
SODIUM SERPL-SCNC: 142 MMOL/L (ref 136–145)
WBC # BLD AUTO: 10.63 K/UL (ref 3.9–12.7)

## 2025-05-22 PROCEDURE — 82040 ASSAY OF SERUM ALBUMIN: CPT

## 2025-05-22 PROCEDURE — 99999 PR PBB SHADOW E&M-EST. PATIENT-LVL IV: CPT | Mod: PBBFAC,,, | Performed by: SURGERY

## 2025-05-22 PROCEDURE — 36415 COLL VENOUS BLD VENIPUNCTURE: CPT

## 2025-05-22 PROCEDURE — 3078F DIAST BP <80 MM HG: CPT | Mod: CPTII,S$GLB,, | Performed by: SURGERY

## 2025-05-22 PROCEDURE — 85025 COMPLETE CBC W/AUTO DIFF WBC: CPT

## 2025-05-22 PROCEDURE — 3008F BODY MASS INDEX DOCD: CPT | Mod: CPTII,S$GLB,, | Performed by: SURGERY

## 2025-05-22 PROCEDURE — 3074F SYST BP LT 130 MM HG: CPT | Mod: CPTII,S$GLB,, | Performed by: SURGERY

## 2025-05-22 PROCEDURE — 99212 OFFICE O/P EST SF 10 MIN: CPT | Mod: S$GLB,,, | Performed by: SURGERY

## 2025-05-22 PROCEDURE — 1159F MED LIST DOCD IN RCRD: CPT | Mod: CPTII,S$GLB,, | Performed by: SURGERY

## 2025-05-22 NOTE — PROGRESS NOTES
Interval History: 5/22/25    Patient presents today for follow up.   He underwent procedure with Urology and is healing well.  He now presents for definitive treatment of hyperparathyroidism.  I discussed the nature of the disease process and the risk of surgery including failure to localize the parathyroid gland, persistent or recurrent hyperparathyroidism with the possibility of the need for a second surgery, temporary or permanent hypoparathyroidism resulting in low blood calcium levels that require extensive medication to avoid serious degenerative conditions such as cataracts, brittle bones, muscle weakness and muscle irritability.  Other risks include bleeding, infection, injury to the recurrent laryngeal nerves resulting in hoarseness or impairment of speech and the risks of general anesthetic including MI, CVA, sudden death or even reaction to anesthetic medications. The patient understands the risks, any and all questions were answered to the patient's satisfaction.     See the last clinic visit note from 1/27/25 below:          Consult Note  Endocrine Surgery    Visit Diagnosis: Hyperparathyroidism [E21.3]    SUBJECTIVE:     Patient is a 60 y.o. male who was referred by No ref. provider found and is here unaccompanied and presents for evaluation of primary hyperparathyroidism. The patient has had complaints of elevation of the serum calcium and parathormone and history of nephrolithiasis. His calcium had been as high as 10.5 in our lab though he mentions have higher levels previously.  There is no history of lithium or thiazide medication use. He has not had previous history of head or neck radiation. He admits several surgical procedures for the recurrent stones(also scheduled this week with Dr. Mariee). He denies sleep disturbance, joint pain, mood changes, abdominal pain, constipation, weakness, and lethargy. Furthermore, there is no history of pathologic fractures, malignancy, or personal history of  thyroid, adrenal, or pancreatic abnormalities. The patient is vitamin D unclear. He is not on calcium supplementation. He does not have familial history of endocrinopathies.    [place] in 2020 ([place] [facility]) and [place] 2022        PTH is 125.6 with corresponding corrected Calcium of 10.7 (5/19/25). Has a 4D CT showing a likely parathyroid adenoma.     Review of patient's allergies indicates:   Allergen Reactions    Pcn [penicillins] Hives       Current Outpatient Medications   Medication Sig Dispense Refill    cholecalciferol, vitamin D3, 1,250 mcg (50,000 unit) capsule Take 50,000 Units by mouth every 7 days.      gabapentin (NEURONTIN) 800 MG tablet Take 800 mg by mouth 3 (three) times daily as needed.      oxyCODONE-acetaminophen (PERCOCET)  mg per tablet Take 1 tablet by mouth every 4 (four) hours.      propranoloL (INDERAL) 10 MG tablet Take 10 mg by mouth 2 (two) times daily.      oxyCODONE (ROXICODONE) 5 MG immediate release tablet Take 1 tablet (5 mg total) by mouth every 6 (six) hours as needed for Pain. 8 tablet 0    tamsulosin (FLOMAX) 0.4 mg Cap Take 1 capsule (0.4 mg total) by mouth once daily. for 14 days 14 capsule 0     No current facility-administered medications for this visit.     Facility-Administered Medications Ordered in Other Visits   Medication Dose Route Frequency Provider Last Rate Last Admin    lactated ringers infusion   Intravenous Continuous Rick Barron MD 20 mL/hr at 10/26/23 0834 New Bag at 10/26/23 0834       Past Medical History:   Diagnosis Date    Arthritis     Chronic low back pain     Hypertension     Kidney stones     Parathyroid disorder 08/2024    Renal disorder     hydronephrosis     Past Surgical History:   Procedure Laterality Date    ANTEGRADE NEPHROSTOGRAPHY Right 1/30/2025    Procedure: Nephrostogram - antegrade;  Surgeon: Mesfin Mariee MD;  Location: [facility] OR [address] [place] [address];  Service: Urology;  Laterality: Right;    CYSTOGRAM  1/30/2025     Procedure: CYSTOGRAM;  Surgeon: Mesfin Mariee MD;  Location: [facility] OR 1ST FLR;  Service: Urology;;    CYSTOSCOPIC LITHOLAPAXY  7/3/2024    Procedure: CYSTOLITHOLAPAXY;  Surgeon: Paul Ruth MD;  Location: [facility] OR;  Service: Urology;;    CYSTOSCOPY N/A 1/30/2025    Procedure: CYSTOSCOPY;  Surgeon: Mesfin Mariee MD;  Location: [facility] OR 1ST FLR;  Service: Urology;  Laterality: N/A;    CYSTOSCOPY N/A 4/10/2025    Procedure: CYSTOSCOPY;  Surgeon: Mesfin Mariee MD;  Location: [facility] OR 1ST FLR;  Service: Urology;  Laterality: N/A;    CYSTOSCOPY W/ LASER LITHOTRIPSY      CYSTOSCOPY W/ RETROGRADES Right 1/30/2025    Procedure: CYSTOSCOPY, WITH RETROGRADE PYELOGRAM;  Surgeon: Mesfin Mariee MD;  Location: [facility] OR 1ST FLR;  Service: Urology;  Laterality: Right;    CYSTOSCOPY W/ URETERAL STENT PLACEMENT Right 3/13/2025    Procedure: CYSTOSCOPY, WITH URETERAL STENT INSERTION;  Surgeon: Mesfin Mariee MD;  Location: [facility] OR 2ND FLR;  Service: Urology;  Laterality: Right;    CYSTOSCOPY W/ URETERAL STENT PLACEMENT Right 4/10/2025    Procedure: CYSTOSCOPY, WITH URETERAL STENT INSERTION;  Surgeon: Mesfin Mariee MD;  Location: [facility] OR 1ST FLR;  Service: Urology;  Laterality: Right;    CYSTOSCOPY W/ URETERAL STENT REMOVAL Right 8/7/2024    Procedure: CYSTOSCOPY, WITH URETERAL STENT REMOVAL;  Surgeon: Paul Ruth MD;  Location: [facility] OR;  Service: Urology;  Laterality: Right;    CYSTOSCOPY WITH CALCULUS EXTRACTION Right 7/3/2024    Procedure: CYSTOSCOPY, WITH CALCULUS REMOVAL;  Surgeon: Paul Ruth MD;  Location: [facility] OR;  Service: Urology;  Laterality: Right;    CYSTOSCOPY WITH CALCULUS EXTRACTION Right 8/7/2024    Procedure: CYSTOSCOPY, WITH CALCULUS REMOVAL;  Surgeon: Paul Ruth MD;  Location: [facility] OR;  Service: Urology;  Laterality: Right;    CYSTOURETEROSCOPY WITH RETROGRADE PYELOGRAPHY AND INSERTION OF STENT INTO URETER Right 10/26/2023    Procedure: CYSTOURETEROSCOPY, WITH RETROGRADE  PYELOGRAM AND URETERAL STENT INSERTION;  Surgeon: Paul Rtuh MD;  Location: [facility] OR;  Service: Urology;  Laterality: Right;    CYSTOURETEROSCOPY WITH RETROGRADE PYELOGRAPHY AND INSERTION OF STENT INTO URETER Right 7/3/2024    Procedure: CYSTOURETEROSCOPY, WITH RETROGRADE PYELOGRAM AND URETERAL STENT INSERTION;  Surgeon: Paul Ruth MD;  Location: [facility] OR;  Service: Urology;  Laterality: Right;    CYSTOURETEROSCOPY WITH RETROGRADE PYELOGRAPHY AND INSERTION OF STENT INTO URETER Right 8/7/2024    Procedure: CYSTOURETEROSCOPY, WITH RETROGRADE PYELOGRAM AND URETERAL STENT INSERTION;  Surgeon: Paul Ruth MD;  Location: [facility] OR;  Service: Urology;  Laterality: Right;    DV5 ROBOTIC URETEROURETEROSTOMY Right 3/13/2025    Procedure: DV5 ROBOTIC URETEROURETEROSTOMY;  Surgeon: Mesfin Mariee MD;  Location: [facility] OR 2ND FLR;  Service: Urology;  Laterality: Right;    FLEXIBLE CYSTOSCOPY N/A 3/13/2025    Procedure: CYSTOSCOPY, FLEXIBLE;  Surgeon: Mesfin Mariee MD;  Location: [facility] OR 2ND FLR;  Service: Urology;  Laterality: N/A;    LASER LITHOTRIPSY Right 10/26/2023    Procedure: LITHOTRIPSY, USING LASER-THULIUM;  Surgeon: Paul Ruth MD;  Location: [facility] OR;  Service: Urology;  Laterality: Right;    LASER LITHOTRIPSY Right 7/3/2024    Procedure: LITHOTRIPSY, USING LASER, Thulium;  Surgeon: Paul Ruth MD;  Location: [facility] OR;  Service: Urology;  Laterality: Right;    LASER LITHOTRIPSY Right 8/7/2024    Procedure: LITHOTRIPSY, USING LASER - Thulium;  Surgeon: Paul Ruth MD;  Location: [facility] OR;  Service: Urology;  Laterality: Right;    LUMBAR FUSION  12/2019    REMOVAL, NEPHROSTOMY TUBE, WITH IMAGING GUIDANCE Right 3/13/2025    Procedure: REMOVAL, NEPHROSTOMY TUBE, WITH IMAGING GUIDANCE;  Surgeon: Mesfin Mariee MD;  Location: [facility] OR 2ND FLR;  Service: Urology;  Laterality: Right;    REMOVAL-STENT Right 4/10/2025    Procedure: REMOVAL-STENT;  Surgeon: Mesfin Mariee MD;  [facility];   Service: Urology;  Laterality: Right;    RETROGRADE PYELOGRAPHY Right 4/10/2025    Procedure: PYELOGRAM, RETROGRADE;  Surgeon: Mesfin Mariee MD;  Location: [facility] OR 1ST FLR;  Service: Urology;  Laterality: Right;    ROBOT-ASSISTED URETEROLITHOTOMY Right 3/13/2025    Procedure: ROBOTIC URETEROLITHOTOMY;  Surgeon: Mesfin Mariee MD;  Location: [facility] OR 2ND FLR;  Service: Urology;  Laterality: Right;    SURGICAL PROCUREMENT OF BUCCAL MUCOSA GRAFT Left 3/13/2025    Procedure: SURGICAL PROCUREMENT, GRAFT, BUCCAL MUCOSA;  Surgeon: Mesfin Mariee MD;  Location: [facility] OR 2ND FLR;  Service: Urology;  Laterality: Left;    URETEROSCOPY Right 3/13/2025    Procedure: URETEROSCOPY;  Surgeon: Mesfin Mariee MD;  Location: [facility] OR 2ND FLR;  Service: Urology;  Laterality: Right;     Social History     Tobacco Use    Smoking status: Never     Passive exposure: Never    Smokeless tobacco: Never   Substance Use Topics    Alcohol use: Not Currently    Drug use: Yes     Types: Marijuana     Comment: weekly          Review of Systems:    Review of Systems   All other systems reviewed and are negative.        OBJECTIVE:     Vital Signs:  See endocrine note for full vitals.    Physical Exam    General:  no distress, see vitals for BMI    Eyes:  conjunctivae/corneas clear   Neck: trachea midline and symmetric, no adenopathy    Thyroid:  thyroid not enlarged   Lung: clear to auscultation bilaterally   Heart:  regular rate and rhythm   Abdomen: soft, non-tender; bowel sounds normal; no masses,  no organomegaly   Skin/Extremities: warm and well-perfused   Pulses: 2+ and symmetric   Neuro: normal without focal findings and mental status, speech normal, alert and oriented x3       Imaging    Studies:  Date:       Results:     DEXA:    Pending   Ultrasound(outside):  5/22/22 Findings:   The right lobe of the thyroid measures 5.7 x 1.6 x 1.9 cm with an estimated volume of 9.0 cc and the left 6.0 x 1.3 x 2.1 cm with an estimated  volume of 7.9 cc.    The isthmus is approximately 0.3 cm in thickness.     The thyroid parenchyma is homogeneous with normal parenchymal vascularity.     Nodules:   There is a solid-appearing hypoechoic nodule in the posterior aspect of the mid right thyroid lobe measuring 8 x 7 x 4 mm. TR 4.     There is a septated colloid cyst in the lateral mid left thyroid lobe measuring 13 mm.   There is a spongiform nodule in the posterior mid left thyroid lobe measuring 9 mm.   There is a hypoechoic nodule which appears solid in the anterior aspect of the lower left thyroid lobe measuring 8 x 8 x 6 mm. TR 4.     No masses are seen posterior to the thyroid gland.     There is no cervical adenopathy.    Sestamebi/Parathyroid scan(outside):  5/22/22 Findings:  On the 20-minute image, there is physiologic uptake of radiotracer within the thyroid gland, salivary glands, and parotid glands.  On the three hour delayed image, there is washout of radiotracer from the thyroid gland with minimal persistent diffuse parenchymal activity noted.  There is no evidence of focal persistent activity to suggest a parathyroid adenoma.    Stone analysis:   7/2024 and 8/2024 10% calcium oxalate dihydrate, and   90% calcium monohydrogen phosphate dihydrate (brushite).  [Findings same for each analysis].     Narrative & Impression  EXAMINATION:  CT NECK PARATHYROID (4D)     CLINICAL HISTORY:  Primary hyperparathyroidism     TECHNIQUE:  CT images obtained throughout the region of the neck before and after the administration of 100 mL Omnipaque 350 intravenous contrast.  Noncontrast, arterial, venous, and delayed phase images were obtained, as per CT parathyroid protocol.  Axial, sagittal and coronal reconstructions were performed.     COMPARISON:  Thyroid ultrasound 02/20/2025.     FINDINGS:  Multinodular thyroid redemonstrated with scattered punctate associated areas of calcification as noted on previous ultrasound 02/20/2025.  Potential target  lesion with enhancement characteristics suspicious for potential parathyroid adenoma in the appropriate clinical setting which appears to be within the posterior aspect of the midpole of the right thyroid lobe (series 3, image 215) measuring 6 mm with relative hyperenhancement on arterial phase imaging and appearing lower density to background thyroid parenchyma on the precontrast and delayed phased images.  No other potential target lesions identified within the neck.     The soft tissues of the nasopharynx, oropharynx, hypopharynx and larynx are within normal limits. The submandibular and parotid glands are within normal limits.     Major vessels of the neck appear patent. Limited intracranial evaluation is within normal limits.  The visualized paranasal sinuses and mastoid air cells are essentially clear.  Tiny presumed osteoma within the anterior right ethmoid sinus measuring approximately 6 mm.     Mild paraseptal emphysematous changes/blebs within the lung apices.     Degenerative changes of the cervical spine including severe disc height loss at C4-C5, C5-C6, and C6-C7.  No acute bony process.     Impression:     1. Solitary potential target lesions suspicious for potential parathyroid adenoma in the appropriate clinical setting measuring 6 mm at the level the posterior right midpole thyroid gland discussed above.  No lymphadenopathy or other discrete mass in the neck again noting additional multinodular thyroid better characterized on ultrasound 02/20/2025.  2. Additional incidental findings as above.        Electronically signed by:Giovanni Aviles  Date:                                            05/12/2025  Time:                                           16:00    Lab Review    24-Hour Urine Collection:  98 mg/spec           Component Value Date    Vit D, 25-Hydroxy 62 01/29/2025    PTH Intact 125.6 (H) 05/19/2025    Calcium 10.4 05/22/2025    Calcium 10.1 03/16/2025    Phosphorus Level 2.0 (L) 05/19/2025     Phosphorus 1.7 (L) 03/16/2025    TSH 0.187 (L) 03/03/2025    Free T4 0.93 03/03/2025           ASSESSMENT/PLAN:       Assessment    oJsh Ruth is an 60 y.o. male who presents with likely primary hyperparathyroidism. The above testing and examination support the diagnosis. Patient meets criteria for recommending parathyroid surgery. This is based on his history of nephrolithiasis. He currently does not have possible localization to the via imaging.       Plan    Imaging: will obtain a Dexa and Ultrasound with possible parathyroid CT scan pending results.  Will also follow-up on updated labs(renal panel, Vitamin D, and iPTH) and 24 urine collection.  Medications: patient should continue current medications.  The natural history and treatment options for primary hyperparathyroidism were discussed with the patient. Parathyroidectomy is the only curative treatment for primary hyperparathyroidism. Parathyroidectomy, both minimally invasive technique and standard four-gland exploration, and its risks were discussed. I was also able to duscuss the expected ladonna-operative course and the risks of surgery including failure to localize the parathyroid gland, persistent or recurrent hyperparathyroidism with the possibility of the need for a second surgery, temporary or permanent hypoparathyroidism resulting in low blood calcium levels that require extensive medication to avoid serious degenerative conditions such as cataracts, brittle bones, muscle weakness and muscle irritability. Other risks include bleeding, infection, injury to the recurrent laryngeal nerves resulting in hoarseness or impairment of speech and the risks of general anesthetic including MI, CVA, sudden death or even reaction to anesthetic medications.The role of intraoperative PTH monitoring was also discussed. The patient understands the risks, any and all questions were answered to the patients satisfaction. The patient is amenable to surgery.  "Will ensure that patient is medically optimized prior to procedure. In addition, the patient was given our "Understanding Parathyroid Disease" handout and directed to the [employer] Patient Education portal as a resource.       I spent a total of 61 minutes on the day of the visit.This includes face to face time and non-face to face time preparing to see the patient (eg, review of tests), obtaining and/or reviewing separately obtained history, documenting clinical information in the electronic or other health record, independently interpreting results and communicating results to the patient/family/caregiver, or care coordinator.   "

## 2025-05-23 RX ORDER — SODIUM CHLORIDE 9 MG/ML
INJECTION, SOLUTION INTRAVENOUS CONTINUOUS
OUTPATIENT
Start: 2025-05-23

## 2025-05-29 ENCOUNTER — ANESTHESIA EVENT (OUTPATIENT)
Dept: SURGERY | Facility: OTHER | Age: 60
End: 2025-05-29
Payer: COMMERCIAL

## 2025-05-30 ENCOUNTER — HOSPITAL ENCOUNTER (OUTPATIENT)
Dept: PREADMISSION TESTING | Facility: OTHER | Age: 60
Discharge: HOME | End: 2025-05-30
Payer: COMMERCIAL

## 2025-05-30 ENCOUNTER — TELEPHONE (OUTPATIENT)
Dept: SURGERY | Facility: CLINIC | Age: 60
End: 2025-05-30
Payer: COMMERCIAL

## 2025-06-02 ENCOUNTER — TELEPHONE (OUTPATIENT)
Dept: SURGERY | Facility: CLINIC | Age: 60
End: 2025-06-02
Payer: COMMERCIAL

## 2025-06-02 ENCOUNTER — HOSPITAL ENCOUNTER (OUTPATIENT)
Dept: PREADMISSION TESTING | Facility: OTHER | Age: 60
Discharge: HOME | End: 2025-06-02
Attending: SURGERY
Payer: COMMERCIAL

## 2025-06-02 VITALS
TEMPERATURE: 98 F | RESPIRATION RATE: 16 BRPM | DIASTOLIC BLOOD PRESSURE: 62 MMHG | SYSTOLIC BLOOD PRESSURE: 108 MMHG | BODY MASS INDEX: 19.62 KG/M2 | HEIGHT: 67 IN | HEART RATE: 73 BPM | OXYGEN SATURATION: 96 % | WEIGHT: 125 LBS

## 2025-06-02 RX ORDER — ALBUTEROL SULFATE 2.5 MG/.5ML
2.5 SOLUTION RESPIRATORY (INHALATION)
Status: CANCELLED | OUTPATIENT
Start: 2025-06-02 | End: 2025-06-02

## 2025-06-02 RX ORDER — DOXYCYCLINE 100 MG/1
100 CAPSULE ORAL 2 TIMES DAILY
COMMUNITY
Start: 2025-05-30

## 2025-06-02 RX ORDER — LIDOCAINE HYDROCHLORIDE 10 MG/ML
0.5 INJECTION, SOLUTION EPIDURAL; INFILTRATION; INTRACAUDAL; PERINEURAL ONCE
Status: CANCELLED | OUTPATIENT
Start: 2025-06-02 | End: 2025-06-02

## 2025-06-02 RX ORDER — SODIUM CHLORIDE, SODIUM LACTATE, POTASSIUM CHLORIDE, CALCIUM CHLORIDE 600; 310; 30; 20 MG/100ML; MG/100ML; MG/100ML; MG/100ML
INJECTION, SOLUTION INTRAVENOUS CONTINUOUS
Status: CANCELLED | OUTPATIENT
Start: 2025-06-02

## 2025-06-02 RX ORDER — ALBUTEROL SULFATE 90 UG/1
INHALANT RESPIRATORY (INHALATION)
COMMUNITY
Start: 2025-05-30

## 2025-06-03 ENCOUNTER — HOSPITAL ENCOUNTER (OUTPATIENT)
Facility: OTHER | Age: 60
Discharge: HOME | End: 2025-06-03
Attending: SURGERY | Admitting: SURGERY
Payer: COMMERCIAL

## 2025-06-03 ENCOUNTER — ANESTHESIA (OUTPATIENT)
Dept: SURGERY | Facility: OTHER | Age: 60
End: 2025-06-03
Payer: COMMERCIAL

## 2025-06-03 DIAGNOSIS — E21.3 HYPERPARATHYROIDISM: Primary | ICD-10-CM

## 2025-06-03 LAB
PTH-INTACT SERPL-MCNC: 140.1 PG/ML
PTH-INTACT SERPL-MCNC: 31.1 PG/ML
PTH-INTACT SERPL-MCNC: 31.6 PG/ML
PTH-INTACT SERPL-MCNC: 37.4 PG/ML
PTH-INTACT SERPL-MCNC: 60.8 PG/ML
PTH-INTACT SERPL-MCNC: 74.7 PG/ML

## 2025-06-03 PROCEDURE — 37000008 HC ANESTHESIA 1ST 15 MINUTES: Performed by: SURGERY

## 2025-06-03 PROCEDURE — 63600175 PHARM REV CODE 636 W HCPCS

## 2025-06-03 PROCEDURE — 25000242 PHARM REV CODE 250 ALT 637 W/ HCPCS

## 2025-06-03 PROCEDURE — 27201423 OPTIME MED/SURG SUP & DEVICES STERILE SUPPLY: Performed by: SURGERY

## 2025-06-03 PROCEDURE — 88305 TISSUE EXAM BY PATHOLOGIST: CPT | Mod: TC,91 | Performed by: SURGERY

## 2025-06-03 PROCEDURE — 71000015 HC POSTOP RECOV 1ST HR: Performed by: SURGERY

## 2025-06-03 PROCEDURE — 63600175 PHARM REV CODE 636 W HCPCS: Performed by: SURGERY

## 2025-06-03 PROCEDURE — 25000003 PHARM REV CODE 250: Performed by: NURSE ANESTHETIST, CERTIFIED REGISTERED

## 2025-06-03 PROCEDURE — 36000707: Performed by: SURGERY

## 2025-06-03 PROCEDURE — 36620 INSERTION CATHETER ARTERY: CPT | Performed by: ANESTHESIOLOGY

## 2025-06-03 PROCEDURE — 94640 AIRWAY INHALATION TREATMENT: CPT

## 2025-06-03 PROCEDURE — 63600175 PHARM REV CODE 636 W HCPCS: Performed by: NURSE ANESTHETIST, CERTIFIED REGISTERED

## 2025-06-03 PROCEDURE — 60500 EXPLORE PARATHYROID GLANDS: CPT | Mod: ,,, | Performed by: SURGERY

## 2025-06-03 PROCEDURE — 71000016 HC POSTOP RECOV ADDL HR: Performed by: SURGERY

## 2025-06-03 PROCEDURE — 36000706: Performed by: SURGERY

## 2025-06-03 PROCEDURE — 37000009 HC ANESTHESIA EA ADD 15 MINS: Performed by: SURGERY

## 2025-06-03 PROCEDURE — 71000039 HC RECOVERY, EACH ADD'L HOUR: Performed by: SURGERY

## 2025-06-03 PROCEDURE — 71000033 HC RECOVERY, INTIAL HOUR: Performed by: SURGERY

## 2025-06-03 PROCEDURE — 63600175 PHARM REV CODE 636 W HCPCS: Performed by: ANESTHESIOLOGY

## 2025-06-03 PROCEDURE — 83970 ASSAY OF PARATHORMONE: CPT | Mod: 91 | Performed by: SURGERY

## 2025-06-03 PROCEDURE — 25000003 PHARM REV CODE 250: Performed by: ANESTHESIOLOGY

## 2025-06-03 RX ORDER — LIDOCAINE HYDROCHLORIDE 20 MG/ML
INJECTION INTRAVENOUS
Status: DISCONTINUED | OUTPATIENT
Start: 2025-06-03 | End: 2025-06-03

## 2025-06-03 RX ORDER — ONDANSETRON HYDROCHLORIDE 2 MG/ML
INJECTION, SOLUTION INTRAVENOUS
Status: DISCONTINUED | OUTPATIENT
Start: 2025-06-03 | End: 2025-06-03

## 2025-06-03 RX ORDER — EPHEDRINE SULFATE 50 MG/ML
INJECTION, SOLUTION INTRAVENOUS
Status: DISCONTINUED | OUTPATIENT
Start: 2025-06-03 | End: 2025-06-03

## 2025-06-03 RX ORDER — ALBUTEROL SULFATE 2.5 MG/.5ML
2.5 SOLUTION RESPIRATORY (INHALATION)
Status: COMPLETED | OUTPATIENT
Start: 2025-06-03 | End: 2025-06-03

## 2025-06-03 RX ORDER — PROPOFOL 10 MG/ML
VIAL (ML) INTRAVENOUS
Status: DISCONTINUED | OUTPATIENT
Start: 2025-06-03 | End: 2025-06-03

## 2025-06-03 RX ORDER — PROCHLORPERAZINE EDISYLATE 5 MG/ML
5 INJECTION INTRAMUSCULAR; INTRAVENOUS EVERY 30 MIN PRN
Status: DISCONTINUED | OUTPATIENT
Start: 2025-06-03 | End: 2025-06-03 | Stop reason: HOSPADM

## 2025-06-03 RX ORDER — BUPIVACAINE HYDROCHLORIDE 5 MG/ML
INJECTION, SOLUTION EPIDURAL; INTRACAUDAL; PERINEURAL
Status: DISCONTINUED | OUTPATIENT
Start: 2025-06-03 | End: 2025-06-03 | Stop reason: HOSPADM

## 2025-06-03 RX ORDER — HYDROMORPHONE HYDROCHLORIDE 2 MG/ML
0.4 INJECTION, SOLUTION INTRAMUSCULAR; INTRAVENOUS; SUBCUTANEOUS EVERY 5 MIN PRN
Status: DISCONTINUED | OUTPATIENT
Start: 2025-06-03 | End: 2025-06-03 | Stop reason: HOSPADM

## 2025-06-03 RX ORDER — CALCIUM CARBONATE 400(1000)
TABLET,CHEWABLE ORAL
COMMUNITY
Start: 2025-06-03

## 2025-06-03 RX ORDER — DOCUSATE SODIUM 100 MG/1
100 CAPSULE, LIQUID FILLED ORAL 2 TIMES DAILY
COMMUNITY
Start: 2025-06-03

## 2025-06-03 RX ORDER — IBUPROFEN 800 MG/1
800 TABLET, FILM COATED ORAL 4 TIMES DAILY
COMMUNITY
Start: 2025-06-03 | End: 2025-06-06

## 2025-06-03 RX ORDER — LIDOCAINE HYDROCHLORIDE 10 MG/ML
0.5 INJECTION, SOLUTION EPIDURAL; INFILTRATION; INTRACAUDAL; PERINEURAL ONCE
Status: DISCONTINUED | OUTPATIENT
Start: 2025-06-03 | End: 2025-06-03 | Stop reason: HOSPADM

## 2025-06-03 RX ORDER — PROPOFOL 10 MG/ML
VIAL (ML) INTRAVENOUS CONTINUOUS PRN
Status: DISCONTINUED | OUTPATIENT
Start: 2025-06-03 | End: 2025-06-03

## 2025-06-03 RX ORDER — SUCCINYLCHOLINE CHLORIDE 20 MG/ML
INJECTION INTRAMUSCULAR; INTRAVENOUS
Status: DISCONTINUED | OUTPATIENT
Start: 2025-06-03 | End: 2025-06-03

## 2025-06-03 RX ORDER — GLUCAGON 1 MG
1 KIT INJECTION
Status: DISCONTINUED | OUTPATIENT
Start: 2025-06-03 | End: 2025-06-03 | Stop reason: HOSPADM

## 2025-06-03 RX ORDER — OXYCODONE HYDROCHLORIDE 5 MG/1
5 TABLET ORAL
Status: DISCONTINUED | OUTPATIENT
Start: 2025-06-03 | End: 2025-06-03 | Stop reason: HOSPADM

## 2025-06-03 RX ORDER — SODIUM CHLORIDE 0.9 % (FLUSH) 0.9 %
3 SYRINGE (ML) INJECTION
Status: DISCONTINUED | OUTPATIENT
Start: 2025-06-03 | End: 2025-06-03 | Stop reason: HOSPADM

## 2025-06-03 RX ORDER — DEXAMETHASONE SODIUM PHOSPHATE 4 MG/ML
INJECTION, SOLUTION INTRA-ARTICULAR; INTRALESIONAL; INTRAMUSCULAR; INTRAVENOUS; SOFT TISSUE
Status: DISCONTINUED | OUTPATIENT
Start: 2025-06-03 | End: 2025-06-03

## 2025-06-03 RX ORDER — ACETAMINOPHEN 325 MG/1
650 TABLET ORAL EVERY 6 HOURS PRN
COMMUNITY
Start: 2025-06-03

## 2025-06-03 RX ORDER — MIDAZOLAM HYDROCHLORIDE 1 MG/ML
INJECTION INTRAMUSCULAR; INTRAVENOUS
Status: DISCONTINUED | OUTPATIENT
Start: 2025-06-03 | End: 2025-06-03

## 2025-06-03 RX ORDER — FENTANYL CITRATE 50 UG/ML
INJECTION, SOLUTION INTRAMUSCULAR; INTRAVENOUS
Status: DISCONTINUED | OUTPATIENT
Start: 2025-06-03 | End: 2025-06-03

## 2025-06-03 RX ORDER — SODIUM CHLORIDE, SODIUM LACTATE, POTASSIUM CHLORIDE, CALCIUM CHLORIDE 600; 310; 30; 20 MG/100ML; MG/100ML; MG/100ML; MG/100ML
INJECTION, SOLUTION INTRAVENOUS CONTINUOUS
Status: DISCONTINUED | OUTPATIENT
Start: 2025-06-03 | End: 2025-06-03 | Stop reason: HOSPADM

## 2025-06-03 RX ORDER — ACETAMINOPHEN 10 MG/ML
INJECTION, SOLUTION INTRAVENOUS
Status: DISCONTINUED | OUTPATIENT
Start: 2025-06-03 | End: 2025-06-03

## 2025-06-03 RX ORDER — SODIUM CHLORIDE 9 MG/ML
INJECTION, SOLUTION INTRAVENOUS CONTINUOUS
Status: DISCONTINUED | OUTPATIENT
Start: 2025-06-03 | End: 2025-06-03 | Stop reason: HOSPADM

## 2025-06-03 RX ADMIN — OXYCODONE 5 MG: 5 TABLET ORAL at 10:06

## 2025-06-03 RX ADMIN — EPHEDRINE SULFATE 10 MG: 50 INJECTION INTRAVENOUS at 07:06

## 2025-06-03 RX ADMIN — SUCCINYLCHOLINE CHLORIDE 180 MG: 20 INJECTION, SOLUTION INTRAMUSCULAR; INTRAVENOUS at 07:06

## 2025-06-03 RX ADMIN — PROPOFOL 25 MG: 10 INJECTION, EMULSION INTRAVENOUS at 07:06

## 2025-06-03 RX ADMIN — PROPOFOL 200 MG: 10 INJECTION, EMULSION INTRAVENOUS at 07:06

## 2025-06-03 RX ADMIN — DEXAMETHASONE SODIUM PHOSPHATE 8 MG: 4 INJECTION, SOLUTION INTRAMUSCULAR; INTRAVENOUS at 07:06

## 2025-06-03 RX ADMIN — MIDAZOLAM HYDROCHLORIDE 2 MG: 1 INJECTION, SOLUTION INTRAMUSCULAR; INTRAVENOUS at 06:06

## 2025-06-03 RX ADMIN — ONDANSETRON HYDROCHLORIDE 4 MG: 2 INJECTION INTRAMUSCULAR; INTRAVENOUS at 09:06

## 2025-06-03 RX ADMIN — EPHEDRINE SULFATE 5 MG: 50 INJECTION INTRAVENOUS at 08:06

## 2025-06-03 RX ADMIN — SODIUM CHLORIDE, SODIUM LACTATE, POTASSIUM CHLORIDE, AND CALCIUM CHLORIDE: 600; 310; 30; 20 INJECTION, SOLUTION INTRAVENOUS at 09:06

## 2025-06-03 RX ADMIN — EPHEDRINE SULFATE 10 MG: 50 INJECTION INTRAVENOUS at 08:06

## 2025-06-03 RX ADMIN — PROPOFOL 20 MG: 10 INJECTION, EMULSION INTRAVENOUS at 09:06

## 2025-06-03 RX ADMIN — ALBUTEROL SULFATE 2.5 MG: 2.5 SOLUTION RESPIRATORY (INHALATION) at 05:06

## 2025-06-03 RX ADMIN — SODIUM CHLORIDE, SODIUM LACTATE, POTASSIUM CHLORIDE, AND CALCIUM CHLORIDE: 600; 310; 30; 20 INJECTION, SOLUTION INTRAVENOUS at 06:06

## 2025-06-03 RX ADMIN — LIDOCAINE HYDROCHLORIDE 100 MG: 20 INJECTION, SOLUTION INTRAVENOUS at 07:06

## 2025-06-03 RX ADMIN — EPHEDRINE SULFATE 5 MG: 50 INJECTION INTRAVENOUS at 07:06

## 2025-06-03 RX ADMIN — GLYCOPYRROLATE 0.2 MG: 0.2 INJECTION, SOLUTION INTRAMUSCULAR; INTRAVITREAL at 07:06

## 2025-06-03 RX ADMIN — ACETAMINOPHEN 1000 MG: 10 INJECTION INTRAVENOUS at 09:06

## 2025-06-03 RX ADMIN — CARBOXYMETHYLCELLULOSE SODIUM 2 DROP: 2.5 SOLUTION/ DROPS OPHTHALMIC at 07:06

## 2025-06-03 RX ADMIN — FENTANYL CITRATE 100 MCG: 50 INJECTION, SOLUTION INTRAMUSCULAR; INTRAVENOUS at 07:06

## 2025-06-03 RX ADMIN — PROPOFOL 50 MCG/KG/MIN: 10 INJECTION, EMULSION INTRAVENOUS at 07:06

## 2025-06-03 RX ADMIN — LIDOCAINE HYDROCHLORIDE 50 MG: 20 INJECTION, SOLUTION INTRAVENOUS at 09:06

## 2025-06-04 ENCOUNTER — RESULTS FOLLOW-UP (OUTPATIENT)
Dept: SURGERY | Facility: OTHER | Age: 60
End: 2025-06-04

## 2025-06-04 VITALS
OXYGEN SATURATION: 97 % | DIASTOLIC BLOOD PRESSURE: 71 MMHG | HEART RATE: 70 BPM | TEMPERATURE: 98 F | RESPIRATION RATE: 18 BRPM | SYSTOLIC BLOOD PRESSURE: 116 MMHG

## 2025-06-04 LAB
ESTROGEN SERPL-MCNC: NORMAL PG/ML
INSULIN SERPL-ACNC: NORMAL U[IU]/ML
LAB AP CLINICAL INFORMATION: NORMAL
LAB AP GROSS DESCRIPTION: NORMAL
LAB AP INTRA OP: NORMAL
LAB AP PERFORMING LOCATION(S): NORMAL
LAB AP REPORT FOOTNOTES: NORMAL

## 2025-06-06 ENCOUNTER — TELEPHONE (OUTPATIENT)
Dept: SURGERY | Facility: CLINIC | Age: 60
End: 2025-06-06
Payer: COMMERCIAL

## 2025-06-07 NOTE — PROGRESS NOTES
"Postoperative Endocrine Surgery Clinic Note    Reason for visit / Chief complaint: Postoperative evaluation  Procedure:  Parathyroidectomy  Procedure Date: 6/3/2025    Subjective:     Josh Ruth returns today for postoperative evaluation, he is approximately 1 week post op.    Procedure:   Right Inferior Parathyroidectomy with intraoperative PTH monitoring     Operative findings:   1) Enlarged right inferior parathyroid gland found in posteriorly and removed. 360 mg.  2) The right superior parathyroid gland, left inferior parathyroid gland, and left superior parathyroid gland were visualized and noted to be grossly normal.  Clips placed next to them.  3) IOPTH levels as follows:                 Baseline - 74.7                 Time zero - 140.1                 5 minute post - 60.8                 10 minute post - 37.4                 15 minute post - 31.1                       20 minute post - 31.6    Discharge medications:  - Calcium carbonate: 1000mg BID    - Cholecalciferol 2000IU Daily     He has had an uncomplicated postoperative course. He denies signs or symptoms of hypocalcemia. His phonation is at baseline.  Pain is well controlled.    Current Medications[1]  Review of patient's allergies indicates:   Allergen Reactions    Pcn [penicillins] Hives       Review of Systems  Negative except as per HPI.     Objective:   /60   Ht 5' 7" (1.702 m)   Wt 56.1 kg (123 lb 10.9 oz)   BMI 19.37 kg/m²     General: alert, well appearing, and in no distress  Neck: neck is flat, no erythema or edema  Incision: well approximated, healing well  Neurological: phonation is normal    Labs:  Lab Results   Component Value Date    CALCIUM 8.4 (L) 06/09/2025    ALBUMIN 3.6 06/09/2025    PHOS 3.1 06/09/2025    [id number] 01/29/2025       Pathology:  Final Diagnosis   1. Parathyroid, right inferior, biopsy:  - Hypercellular parathyroid tissue present  - Weight:  360 mg     2. Parathyroid, right superior, " biopsy:  - Parathyroid tissue present  - Weight:  10 mg      3. Parathyroid, left inferior, biopsy:  - Parathyroid tissue present  - Weight:  30 mg      4. Parathyroid, left superior, biopsy:  - Lymphoid tissue present  - No definitive parathyroid tissue present  - Weight: 30 mg          Assessment and Plan     Primary hyperparathyroidism s/p Parathyroidectomy on 6/3/2025.  Doing well postoperatively.  Voice adequate.  No hypocalcemia symptoms.    - Discontinue calcium supplements(will taper)  - Reviewed pathology  - Incision care discussed, scar massage and routine scar care information provided  - Recommended daily dietary calcium intake equal to about 7542-6402 mg  - Recommend 7522-9000 IU vitamin D3 supplementation daily, available over the counter  - Obtain labs in six months to document cure of primary hyperparathyroidism with RFP, PTH and Vitamin D    Hyperparathyroidism  -     PTH, intact; Future; Expected date: 06/09/2025  -     Renal Function Panel; Future; Expected date: 06/09/2025  -     Vitamin D; Future; Expected date: 06/09/2025       Patient was seen and evaluated with surgery resident Conrad Barnhart MD.           [1]   Current Outpatient Medications   Medication Sig Dispense Refill    acetaminophen (TYLENOL) 325 MG tablet Take 2 tablets (650 mg total) by mouth every 6 (six) hours as needed for Pain. Take 650mg at 0600, 1200(noon), 1800, 0000(midnight) for 3 days.      albuterol (PROVENTIL/VENTOLIN HFA) 90 mcg/actuation inhaler Inhale into the lungs.      calcium carbonate 400 mg calcium (1,000 mg) Chew Take 1000mg calcium twice daily(Tums Ultra).      cholecalciferol, vitamin D3, 1,250 mcg (50,000 unit) capsule Take 50,000 Units by mouth every 7 days.      docusate sodium (COLACE) 100 MG capsule Take 1 capsule (100 mg total) by mouth 2 (two) times daily. Take in conjunction with Calcium and narcotic pain medication.    May substitute any OTC stool softener.      doxycycline (MONODOX) 100 MG  capsule Take 100 mg by mouth 2 (two) times daily.      gabapentin (NEURONTIN) 800 MG tablet Take 800 mg by mouth 3 (three) times daily as needed.      oxyCODONE-acetaminophen (PERCOCET)  mg per tablet Take 1 tablet by mouth every 4 (four) hours.      propranoloL (INDERAL) 10 MG tablet Take 10 mg by mouth 2 (two) times daily.       No current facility-administered medications for this visit.     Facility-Administered Medications Ordered in Other Visits   Medication Dose Route Frequency Provider Last Rate Last Admin    lactated ringers infusion   Intravenous Continuous Rick Barron MD 20 mL/hr at 10/26/23 0834 New Bag at 06/03/25 0900

## 2025-06-09 ENCOUNTER — RESULTS FOLLOW-UP (OUTPATIENT)
Dept: SURGERY | Facility: CLINIC | Age: 60
End: 2025-06-09

## 2025-06-09 ENCOUNTER — LAB VISIT (OUTPATIENT)
Dept: LAB | Facility: HOSPITAL | Age: 60
End: 2025-06-09
Attending: SURGERY
Payer: COMMERCIAL

## 2025-06-09 ENCOUNTER — OFFICE VISIT (OUTPATIENT)
Dept: SURGERY | Facility: CLINIC | Age: 60
End: 2025-06-09
Payer: COMMERCIAL

## 2025-06-09 VITALS
DIASTOLIC BLOOD PRESSURE: 60 MMHG | HEIGHT: 67 IN | BODY MASS INDEX: 19.41 KG/M2 | SYSTOLIC BLOOD PRESSURE: 100 MMHG | WEIGHT: 123.69 LBS

## 2025-06-09 DIAGNOSIS — E21.3 HYPERPARATHYROIDISM: ICD-10-CM

## 2025-06-09 DIAGNOSIS — E21.3 HYPERPARATHYROIDISM: Primary | ICD-10-CM

## 2025-06-09 LAB
ALBUMIN SERPL BCP-MCNC: 3.6 G/DL (ref 3.5–5.2)
ANION GAP (OHS): 6 MMOL/L (ref 8–16)
BUN SERPL-MCNC: 17 MG/DL (ref 6–20)
CALCIUM SERPL-MCNC: 8.4 MG/DL (ref 8.7–10.5)
CHLORIDE SERPL-SCNC: 104 MMOL/L (ref 95–110)
CO2 SERPL-SCNC: 28 MMOL/L (ref 23–29)
CREAT SERPL-MCNC: 1 MG/DL (ref 0.5–1.4)
GFR SERPLBLD CREATININE-BSD FMLA CKD-EPI: >60 ML/MIN/1.73/M2
GLUCOSE SERPL-MCNC: 117 MG/DL (ref 70–110)
PHOSPHATE SERPL-MCNC: 3.1 MG/DL (ref 2.7–4.5)
POTASSIUM SERPL-SCNC: 3.9 MMOL/L (ref 3.5–5.1)
SODIUM SERPL-SCNC: 138 MMOL/L (ref 136–145)

## 2025-06-09 PROCEDURE — 36415 COLL VENOUS BLD VENIPUNCTURE: CPT

## 2025-06-09 PROCEDURE — 3078F DIAST BP <80 MM HG: CPT | Mod: CPTII,S$GLB,, | Performed by: SURGERY

## 2025-06-09 PROCEDURE — 99024 POSTOP FOLLOW-UP VISIT: CPT | Mod: S$GLB,,, | Performed by: SURGERY

## 2025-06-09 PROCEDURE — 3074F SYST BP LT 130 MM HG: CPT | Mod: CPTII,S$GLB,, | Performed by: SURGERY

## 2025-06-09 PROCEDURE — 99999 PR PBB SHADOW E&M-EST. PATIENT-LVL III: CPT | Mod: PBBFAC,,, | Performed by: SURGERY

## 2025-06-09 PROCEDURE — 1160F RVW MEDS BY RX/DR IN RCRD: CPT | Mod: CPTII,S$GLB,, | Performed by: SURGERY

## 2025-06-09 PROCEDURE — 82040 ASSAY OF SERUM ALBUMIN: CPT

## 2025-06-09 PROCEDURE — 1159F MED LIST DOCD IN RCRD: CPT | Mod: CPTII,S$GLB,, | Performed by: SURGERY

## 2025-06-09 NOTE — PATIENT INSTRUCTIONS
Wean Calcium(TUMS) as follows:  Calcium 1000mg daily for five days and then resume pre-operative calcium supplement regimen.

## 2025-06-09 NOTE — Clinical Note
Afternoon,  Patient underwent parathyroid surgery one week ago.  He has done well since the procedure.  Hoping that this helps with his stone burden.  Let me know if you need anything.  Regards,  aob

## 2025-07-07 ENCOUNTER — TELEPHONE (OUTPATIENT)
Dept: UROLOGY | Facility: CLINIC | Age: 60
End: 2025-07-07
Payer: COMMERCIAL

## 2025-07-07 NOTE — PROGRESS NOTES
Procedure: Flexible cysto-uretheroscopy and stent removal   Pre Procedure Diagnosis:s/p robotic right buccal ureteroplasty  Post Procedure Diagnosis:same   Surgeon: Mesfin Mariee MD   Anesthesia: 2% uro-jet lidocaine jelly for local analgesia   Flexible cysto-urethroscopy was performed after consent was obtained. The risks and benefits were explained.   2% lidocaine urojet was used for local analgesia.   The genitalia was prepped and draped in the sterile fashion with betadine.   The flexible scope was advanced into the urethra and into the bladder. The stent was removed without difficulty.   The patient tolerated the procedure well without complication.   They will follow up in 3 months with a CT urogram.

## 2025-07-10 ENCOUNTER — TELEPHONE (OUTPATIENT)
Dept: UROLOGY | Facility: CLINIC | Age: 60
End: 2025-07-10
Payer: COMMERCIAL

## 2025-08-07 ENCOUNTER — PATIENT MESSAGE (OUTPATIENT)
Dept: RESEARCH | Facility: OTHER | Age: 60
End: 2025-08-07
Payer: COMMERCIAL

## 2025-08-19 ENCOUNTER — OFFICE VISIT (OUTPATIENT)
Dept: UROLOGY | Facility: CLINIC | Age: 60
End: 2025-08-19
Payer: COMMERCIAL

## 2025-08-19 VITALS
HEIGHT: 67 IN | BODY MASS INDEX: 19.73 KG/M2 | DIASTOLIC BLOOD PRESSURE: 73 MMHG | SYSTOLIC BLOOD PRESSURE: 111 MMHG | HEART RATE: 69 BPM | WEIGHT: 125.69 LBS

## 2025-08-19 DIAGNOSIS — N20.0 KIDNEY STONES: Primary | ICD-10-CM

## 2025-08-19 LAB
BACTERIA #/AREA URNS AUTO: ABNORMAL /HPF
BILIRUB UR QL STRIP.AUTO: NEGATIVE
CLARITY UR: ABNORMAL
COLOR UR AUTO: YELLOW
GLUCOSE UR QL STRIP: NEGATIVE
HGB UR QL STRIP: ABNORMAL
HYALINE CASTS UR QL AUTO: 13 /LPF (ref 0–1)
KETONES UR QL STRIP: NEGATIVE
LEUKOCYTE ESTERASE UR QL STRIP: ABNORMAL
MICROSCOPIC COMMENT: ABNORMAL
NITRITE UR QL STRIP: NEGATIVE
PH UR STRIP: 6 [PH]
PROT UR QL STRIP: ABNORMAL
RBC #/AREA URNS AUTO: 40 /HPF (ref 0–4)
SP GR UR STRIP: 1.03
SQUAMOUS #/AREA URNS AUTO: <1 /HPF
UROBILINOGEN UR STRIP-ACNC: ABNORMAL EU/DL
WBC #/AREA URNS AUTO: >100 /HPF (ref 0–5)
WBC CLUMPS UR QL AUTO: ABNORMAL

## 2025-08-19 PROCEDURE — 1159F MED LIST DOCD IN RCRD: CPT | Mod: CPTII,S$GLB,, | Performed by: UROLOGY

## 2025-08-19 PROCEDURE — 3078F DIAST BP <80 MM HG: CPT | Mod: CPTII,S$GLB,, | Performed by: UROLOGY

## 2025-08-19 PROCEDURE — 1160F RVW MEDS BY RX/DR IN RCRD: CPT | Mod: CPTII,S$GLB,, | Performed by: UROLOGY

## 2025-08-19 PROCEDURE — 3008F BODY MASS INDEX DOCD: CPT | Mod: CPTII,S$GLB,, | Performed by: UROLOGY

## 2025-08-19 PROCEDURE — 87086 URINE CULTURE/COLONY COUNT: CPT | Performed by: UROLOGY

## 2025-08-19 PROCEDURE — 99999 PR PBB SHADOW E&M-EST. PATIENT-LVL IV: CPT | Mod: PBBFAC,,, | Performed by: UROLOGY

## 2025-08-19 PROCEDURE — 99214 OFFICE O/P EST MOD 30 MIN: CPT | Mod: S$GLB,,, | Performed by: UROLOGY

## 2025-08-19 PROCEDURE — 3074F SYST BP LT 130 MM HG: CPT | Mod: CPTII,S$GLB,, | Performed by: UROLOGY

## 2025-08-19 PROCEDURE — 81003 URINALYSIS AUTO W/O SCOPE: CPT | Performed by: UROLOGY

## 2025-08-19 RX ORDER — SULFAMETHOXAZOLE AND TRIMETHOPRIM 800; 160 MG/1; MG/1
1 TABLET ORAL 2 TIMES DAILY
Qty: 28 TABLET | Refills: 0 | Status: SHIPPED | OUTPATIENT
Start: 2025-08-19 | End: 2025-08-22 | Stop reason: ALTCHOICE

## 2025-08-21 ENCOUNTER — TELEPHONE (OUTPATIENT)
Dept: UROLOGY | Facility: CLINIC | Age: 60
End: 2025-08-21
Payer: COMMERCIAL

## 2025-08-22 ENCOUNTER — LAB VISIT (OUTPATIENT)
Dept: LAB | Facility: HOSPITAL | Age: 60
End: 2025-08-22
Attending: ANESTHESIOLOGY
Payer: COMMERCIAL

## 2025-08-22 ENCOUNTER — TELEPHONE (OUTPATIENT)
Dept: UROLOGY | Facility: CLINIC | Age: 60
End: 2025-08-22
Payer: COMMERCIAL

## 2025-08-22 DIAGNOSIS — Z01.818 PREOPERATIVE TESTING: ICD-10-CM

## 2025-08-22 DIAGNOSIS — E21.3 HYPERPARATHYROIDISM: ICD-10-CM

## 2025-08-22 LAB
25(OH)D3+25(OH)D2 SERPL-MCNC: 68 NG/ML (ref 30–96)
ALBUMIN SERPL BCP-MCNC: 3.9 G/DL (ref 3.5–5.2)
ANION GAP (OHS): 9 MMOL/L (ref 8–16)
BACTERIA UR CULT: ABNORMAL
BUN SERPL-MCNC: 19 MG/DL (ref 6–20)
CALCIUM SERPL-MCNC: 8.9 MG/DL (ref 8.7–10.5)
CHLORIDE SERPL-SCNC: 105 MMOL/L (ref 95–110)
CO2 SERPL-SCNC: 25 MMOL/L (ref 23–29)
CREAT SERPL-MCNC: 1.2 MG/DL (ref 0.5–1.4)
ERYTHROCYTE [DISTWIDTH] IN BLOOD BY AUTOMATED COUNT: 13.2 % (ref 11.5–14.5)
GFR SERPLBLD CREATININE-BSD FMLA CKD-EPI: >60 ML/MIN/1.73/M2
GLUCOSE SERPL-MCNC: 115 MG/DL (ref 70–110)
HCT VFR BLD AUTO: 44.3 % (ref 40–54)
HGB BLD-MCNC: 14.6 GM/DL (ref 14–18)
MCH RBC QN AUTO: 30.1 PG (ref 27–31)
MCHC RBC AUTO-ENTMCNC: 33 G/DL (ref 32–36)
MCV RBC AUTO: 91 FL (ref 82–98)
PHOSPHATE SERPL-MCNC: 2.4 MG/DL (ref 2.7–4.5)
PLATELET # BLD AUTO: 271 K/UL (ref 150–450)
PMV BLD AUTO: 10 FL (ref 9.2–12.9)
POTASSIUM SERPL-SCNC: 5.1 MMOL/L (ref 3.5–5.1)
PTH-INTACT SERPL-MCNC: 59.8 PG/ML (ref 9–77)
RBC # BLD AUTO: 4.85 M/UL (ref 4.6–6.2)
SODIUM SERPL-SCNC: 139 MMOL/L (ref 136–145)
WBC # BLD AUTO: 11.89 K/UL (ref 3.9–12.7)

## 2025-08-22 PROCEDURE — 82040 ASSAY OF SERUM ALBUMIN: CPT

## 2025-08-22 PROCEDURE — 85027 COMPLETE CBC AUTOMATED: CPT

## 2025-08-22 PROCEDURE — 82306 VITAMIN D 25 HYDROXY: CPT

## 2025-08-22 PROCEDURE — 36415 COLL VENOUS BLD VENIPUNCTURE: CPT | Mod: PO

## 2025-08-22 PROCEDURE — 83970 ASSAY OF PARATHORMONE: CPT

## 2025-08-27 ENCOUNTER — TELEPHONE (OUTPATIENT)
Dept: UROLOGY | Facility: CLINIC | Age: 60
End: 2025-08-27
Payer: COMMERCIAL

## 2025-08-28 ENCOUNTER — HOSPITAL ENCOUNTER (OUTPATIENT)
Facility: HOSPITAL | Age: 60
Discharge: HOME | End: 2025-08-28
Attending: UROLOGY | Admitting: UROLOGY
Payer: COMMERCIAL

## 2025-08-28 ENCOUNTER — ANESTHESIA EVENT (OUTPATIENT)
Dept: SURGERY | Facility: HOSPITAL | Age: 60
End: 2025-08-28
Payer: COMMERCIAL

## 2025-08-28 ENCOUNTER — ANESTHESIA (OUTPATIENT)
Dept: SURGERY | Facility: HOSPITAL | Age: 60
End: 2025-08-28
Payer: COMMERCIAL

## 2025-08-28 VITALS
SYSTOLIC BLOOD PRESSURE: 156 MMHG | OXYGEN SATURATION: 99 % | HEART RATE: 66 BPM | TEMPERATURE: 98 F | BODY MASS INDEX: 20.36 KG/M2 | DIASTOLIC BLOOD PRESSURE: 70 MMHG | RESPIRATION RATE: 20 BRPM | WEIGHT: 130 LBS

## 2025-08-28 DIAGNOSIS — N20.0 KIDNEY STONES: ICD-10-CM

## 2025-08-28 DIAGNOSIS — N20.0 NEPHROLITHIASIS: Primary | ICD-10-CM

## 2025-08-28 DIAGNOSIS — N20.0 NEPHROLITHIASIS: ICD-10-CM

## 2025-08-28 DIAGNOSIS — Z01.818 PREOPERATIVE TESTING: Primary | ICD-10-CM

## 2025-08-28 PROCEDURE — C1894 INTRO/SHEATH, NON-LASER: HCPCS | Performed by: UROLOGY

## 2025-08-28 PROCEDURE — 37000008 HC ANESTHESIA 1ST 15 MINUTES: Performed by: UROLOGY

## 2025-08-28 PROCEDURE — C1769 GUIDE WIRE: HCPCS | Performed by: UROLOGY

## 2025-08-28 PROCEDURE — 82365 CALCULUS SPECTROSCOPY: CPT | Performed by: UROLOGY

## 2025-08-28 PROCEDURE — 36000708 HC OR TIME LEV III 1ST 15 MIN: Performed by: UROLOGY

## 2025-08-28 PROCEDURE — 37000009 HC ANESTHESIA EA ADD 15 MINS: Performed by: UROLOGY

## 2025-08-28 PROCEDURE — C2617 STENT, NON-COR, TEM W/O DEL: HCPCS | Performed by: UROLOGY

## 2025-08-28 PROCEDURE — 71000044 HC DOSC ROUTINE RECOVERY FIRST HOUR: Performed by: UROLOGY

## 2025-08-28 PROCEDURE — 27201423 OPTIME MED/SURG SUP & DEVICES STERILE SUPPLY: Performed by: UROLOGY

## 2025-08-28 PROCEDURE — 71000015 HC POSTOP RECOV 1ST HR: Performed by: UROLOGY

## 2025-08-28 PROCEDURE — 36000709 HC OR TIME LEV III EA ADD 15 MIN: Performed by: UROLOGY

## 2025-08-28 PROCEDURE — 63600175 PHARM REV CODE 636 W HCPCS

## 2025-08-28 PROCEDURE — 25000003 PHARM REV CODE 250

## 2025-08-28 DEVICE — STENT URETERAL UNIV 6FR 24CM: Type: IMPLANTABLE DEVICE | Site: URETER | Status: FUNCTIONAL

## 2025-08-28 DEVICE — STENT URETERAL UNIV 7FR 26CM: Type: IMPLANTABLE DEVICE | Site: URETER | Status: FUNCTIONAL

## 2025-08-28 RX ORDER — HYDROMORPHONE HYDROCHLORIDE 1 MG/ML
0.2 INJECTION, SOLUTION INTRAMUSCULAR; INTRAVENOUS; SUBCUTANEOUS EVERY 5 MIN PRN
Status: DISCONTINUED | OUTPATIENT
Start: 2025-08-28 | End: 2025-08-28 | Stop reason: HOSPADM

## 2025-08-28 RX ORDER — SODIUM CHLORIDE 0.9 % (FLUSH) 0.9 %
10 SYRINGE (ML) INJECTION
Status: DISCONTINUED | OUTPATIENT
Start: 2025-08-28 | End: 2025-08-28 | Stop reason: HOSPADM

## 2025-08-28 RX ORDER — TAMSULOSIN HYDROCHLORIDE 0.4 MG/1
0.4 CAPSULE ORAL DAILY
Qty: 7 CAPSULE | Refills: 0 | Status: SHIPPED | OUTPATIENT
Start: 2025-08-28 | End: 2025-09-04

## 2025-08-28 RX ORDER — PHENAZOPYRIDINE HYDROCHLORIDE 100 MG/1
100 TABLET, FILM COATED ORAL 3 TIMES DAILY PRN
Qty: 21 TABLET | Refills: 0 | Status: SHIPPED | OUTPATIENT
Start: 2025-08-28 | End: 2025-09-04

## 2025-08-28 RX ORDER — GLUCAGON 1 MG
1 KIT INJECTION
Status: DISCONTINUED | OUTPATIENT
Start: 2025-08-28 | End: 2025-08-28 | Stop reason: HOSPADM

## 2025-08-28 RX ORDER — OXYBUTYNIN CHLORIDE 5 MG/1
5 TABLET ORAL 3 TIMES DAILY PRN
Qty: 21 TABLET | Refills: 0 | Status: SHIPPED | OUTPATIENT
Start: 2025-08-28 | End: 2025-09-04

## 2025-08-28 RX ORDER — GENTAMICIN 40 MG/ML
INJECTION, SOLUTION INTRAMUSCULAR; INTRAVENOUS
Status: DISCONTINUED
Start: 2025-08-28 | End: 2025-08-28 | Stop reason: HOSPADM

## 2025-08-28 RX ORDER — VANCOMYCIN HYDROCHLORIDE 1 G/20ML
INJECTION, POWDER, LYOPHILIZED, FOR SOLUTION INTRAVENOUS
Status: DISCONTINUED
Start: 2025-08-28 | End: 2025-08-28 | Stop reason: HOSPADM

## 2025-08-28 RX ADMIN — VANCOMYCIN HYDROCHLORIDE 750 MG: 750 INJECTION, POWDER, LYOPHILIZED, FOR SOLUTION INTRAVENOUS at 11:08

## (undated) DEVICE — SOL NACL IRR 3000ML

## (undated) DEVICE — SYR 10CC LUER LOCK

## (undated) DEVICE — GUIDEWIRE STR TIP HIWIRE 150CM

## (undated) DEVICE — SUT 3-0 12-18IN SILK

## (undated) DEVICE — NDL HYPO REG 25G X 1 1/2

## (undated) DEVICE — FIBER QUANTA OPT SDT 272UM

## (undated) DEVICE — TRAY CYSTO BASIN OMC

## (undated) DEVICE — SET TRI-LUMEN FILTERED TUBE

## (undated) DEVICE — TOWEL OR DISP STRL BLUE 4/PK

## (undated) DEVICE — ELECTRODE REM PLYHSV RETURN 9

## (undated) DEVICE — CLIP LIGATING HEMOCLP SMALL

## (undated) DEVICE — SYR 50ML CATH TIP

## (undated) DEVICE — BLADE SURG CARBON STEEL SZ11

## (undated) DEVICE — BLADE SENSICLIP CLIPPER SURG

## (undated) DEVICE — CONTAINER SPEC OR STRL 4.5OZ

## (undated) DEVICE — UNDERGLOVES BIOGEL PI SIZE 7.5

## (undated) DEVICE — CATH POLLACK OPEN-END FLEXI-TI

## (undated) DEVICE — COVER TIP CURVED SCISSORS XI

## (undated) DEVICE — DRAPE ORTH SPLIT 77X108IN

## (undated) DEVICE — SOL NACL 0.9% IV INJ 1000ML

## (undated) DEVICE — GAUZE SPONGE XRAY 4X4

## (undated) DEVICE — PACK CYSTOSCOPY III SIRUS

## (undated) DEVICE — SUT VICRYL PLUS 3-0 SH 18IN

## (undated) DEVICE — BAG PRESSURE INFUSER 3000CC

## (undated) DEVICE — SOL NORMAL USPCA 0.9%

## (undated) DEVICE — IRRIGATOR ENDOSCOPY DISP.

## (undated) DEVICE — SPONGE GAUZE 16PLY 4X4

## (undated) DEVICE — CATH SELECTSILICON FOL 18F 10M

## (undated) DEVICE — PORT AIRSEAL 12/120MM LPI

## (undated) DEVICE — DRAPE SCOPE PILLOW WARMER

## (undated) DEVICE — ELECTRODE BLADE INSULATED 1 IN

## (undated) DEVICE — NDL INSUF ULTRA VERESS 120MM

## (undated) DEVICE — UNDERGLOVES BIOGEL PI SZ 7 LF

## (undated) DEVICE — SUT MCRYL PLUS 4-0 PS2 27IN

## (undated) DEVICE — TRAY MINOR GEN SURG OMC

## (undated) DEVICE — ADHESIVE DERMABOND ADVANCED

## (undated) DEVICE — GOWN POLY REINF SET SLV XL

## (undated) DEVICE — GUIDE WIRE MOTION .035 X 150CM

## (undated) DEVICE — SCRUB HIBICLENS 4% CHG 4OZ

## (undated) DEVICE — SUT MONOCRYL 3-0 RB1

## (undated) DEVICE — ADAPTER HOSE 10FT 8MM

## (undated) DEVICE — SCISSOR 5MMX35CM DIRECT DRIVE

## (undated) DEVICE — CLIP LIGATING MEDIUM

## (undated) DEVICE — DRAPE ARM DAVINCI XI

## (undated) DEVICE — KIT VUETIP TROCAR SWAB

## (undated) DEVICE — NDL HYPO STD REG BVL 18GX1.5IN

## (undated) DEVICE — LEGGING CLEAR POLY 2/PACK

## (undated) DEVICE — DRAPE STERI INSTRUMENT 1018

## (undated) DEVICE — DRAPE FLUID WARMER 44X44IN

## (undated) DEVICE — DRESSING TRANS 4X4 TEGADERM

## (undated) DEVICE — LUBRICANT SURGILUBE 2 OZ

## (undated) DEVICE — SYR ONLY LUER LOCK 20CC

## (undated) DEVICE — GOWN ECLIPSE REINF LVL4 TWL LG

## (undated) DEVICE — DRAPE INCISE IOBAN 2 23X17IN

## (undated) DEVICE — SEAL UNIVERSAL 5MM-8MM XI

## (undated) DEVICE — Device

## (undated) DEVICE — TROCAR ENDOPATH XCEL 5MM 7.5CM

## (undated) DEVICE — EXTRACTOR TIPLESS 2.4FRX1115CM

## (undated) DEVICE — ELECTRODE MEGADYNE RETURN DUAL

## (undated) DEVICE — CLIP HEMO-LOK MLX LARGE LF

## (undated) DEVICE — SYR 30CC LUER LOCK

## (undated) DEVICE — PAD PINK TRENDELENBURG POS XL

## (undated) DEVICE — URETEROSCOPE LITHOVUE STANDARD

## (undated) DEVICE — BLADE SURG STAINLESS STEEL #15

## (undated) DEVICE — DRAPE INSTR MAGNETIC 10X16IN

## (undated) DEVICE — SUT EASE CROSSBOW CLSR SYS

## (undated) DEVICE — SOL WATER STRL IRR 1000ML

## (undated) DEVICE — GLOVE BIOGEL ECLIPSE SZ 7

## (undated) DEVICE — DRESSING TELFA N ADH 3X8

## (undated) DEVICE — SOL ELECTROLUBE ANTI-STIC

## (undated) DEVICE — SPONGE KITTNER 1/4X 5/8 L STRL

## (undated) DEVICE — PACK UNIV PROCEDURE

## (undated) DEVICE — SUT 4-0 12-18IN SILK BLACK

## (undated) DEVICE — TRAY DRY SKIN SCRUB PREP

## (undated) DEVICE — KIT ANTIFOG W/SPONG & FLUID

## (undated) DEVICE — TROCAR ENDOPATH XCEL 12X100MM

## (undated) DEVICE — COVER LIGHT HANDLE

## (undated) DEVICE — CORD BIPOLAR 12 FOOT

## (undated) DEVICE — HEMOSTAT SURGICEL FIBRLR 1X2IN

## (undated) DEVICE — PROBE PRASS SLIM

## (undated) DEVICE — UNDERGLOVES BIOGEL PI SZ 6 LF

## (undated) DEVICE — DRAPE ABDOMINAL TIBURON 14X11

## (undated) DEVICE — SHEATH FLEXOR URETERAL 45CM

## (undated) DEVICE — PENCIL ROCKER SWITCH 10FT CORD

## (undated) DEVICE — BAG DRAINAGE DISP LF 600ML

## (undated) DEVICE — CLIPPER BLADE MOD 4406 (CAREF)